# Patient Record
Sex: FEMALE | Race: BLACK OR AFRICAN AMERICAN | NOT HISPANIC OR LATINO | Employment: UNEMPLOYED | URBAN - METROPOLITAN AREA
[De-identification: names, ages, dates, MRNs, and addresses within clinical notes are randomized per-mention and may not be internally consistent; named-entity substitution may affect disease eponyms.]

---

## 2015-03-11 LAB — PAP: NORMAL

## 2017-01-19 ENCOUNTER — HOSPITAL ENCOUNTER (EMERGENCY)
Facility: CLINIC | Age: 32
Discharge: HOME | End: 2017-01-19
Attending: EMERGENCY MEDICINE | Admitting: EMERGENCY MEDICINE
Payer: MEDICAID

## 2017-01-19 ENCOUNTER — APPOINTMENT (OUTPATIENT)
Dept: ULTRASOUND IMAGING | Facility: CLINIC | Age: 32
End: 2017-01-19
Attending: EMERGENCY MEDICINE
Payer: MEDICAID

## 2017-01-19 ENCOUNTER — TELEPHONE (OUTPATIENT)
Dept: OBGYN | Facility: CLINIC | Age: 32
End: 2017-01-19

## 2017-01-19 VITALS
HEART RATE: 66 BPM | RESPIRATION RATE: 18 BRPM | DIASTOLIC BLOOD PRESSURE: 95 MMHG | OXYGEN SATURATION: 96 % | TEMPERATURE: 98.7 F | SYSTOLIC BLOOD PRESSURE: 129 MMHG

## 2017-01-19 DIAGNOSIS — O20.9 VAGINAL BLEEDING IN PREGNANCY, FIRST TRIMESTER: ICD-10-CM

## 2017-01-19 DIAGNOSIS — O20.0 THREATENED MISCARRIAGE: Primary | ICD-10-CM

## 2017-01-19 LAB
ABO + RH BLD: NORMAL
ABO + RH BLD: NORMAL
ALBUMIN UR-MCNC: 10 MG/DL
ANION GAP SERPL CALCULATED.3IONS-SCNC: 9 MMOL/L (ref 3–14)
APPEARANCE UR: CLEAR
B-HCG SERPL-ACNC: 184 IU/L
BASOPHILS # BLD AUTO: 0.1 10E9/L (ref 0–0.2)
BASOPHILS NFR BLD AUTO: 0.5 %
BILIRUB UR QL STRIP: NEGATIVE
BUN SERPL-MCNC: 11 MG/DL (ref 7–30)
CALCIUM SERPL-MCNC: 8 MG/DL (ref 8.5–10.1)
CHLORIDE SERPL-SCNC: 105 MMOL/L (ref 94–109)
CO2 SERPL-SCNC: 25 MMOL/L (ref 20–32)
COLOR UR AUTO: YELLOW
CREAT SERPL-MCNC: 0.6 MG/DL (ref 0.52–1.04)
DIFFERENTIAL METHOD BLD: NORMAL
EOSINOPHIL # BLD AUTO: 0.2 10E9/L (ref 0–0.7)
EOSINOPHIL NFR BLD AUTO: 1.6 %
ERYTHROCYTE [DISTWIDTH] IN BLOOD BY AUTOMATED COUNT: 13.2 % (ref 10–15)
GFR SERPL CREATININE-BSD FRML MDRD: ABNORMAL ML/MIN/1.7M2
GLUCOSE SERPL-MCNC: 101 MG/DL (ref 70–99)
GLUCOSE UR STRIP-MCNC: NEGATIVE MG/DL
HCT VFR BLD AUTO: 39.4 % (ref 35–47)
HGB BLD-MCNC: 13.1 G/DL (ref 11.7–15.7)
HGB UR QL STRIP: ABNORMAL
IMM GRANULOCYTES # BLD: 0 10E9/L (ref 0–0.4)
IMM GRANULOCYTES NFR BLD: 0.2 %
KETONES UR STRIP-MCNC: NEGATIVE MG/DL
LEUKOCYTE ESTERASE UR QL STRIP: ABNORMAL
LYMPHOCYTES # BLD AUTO: 3.3 10E9/L (ref 0.8–5.3)
LYMPHOCYTES NFR BLD AUTO: 33.8 %
MCH RBC QN AUTO: 29.4 PG (ref 26.5–33)
MCHC RBC AUTO-ENTMCNC: 33.2 G/DL (ref 31.5–36.5)
MCV RBC AUTO: 89 FL (ref 78–100)
MONOCYTES # BLD AUTO: 0.6 10E9/L (ref 0–1.3)
MONOCYTES NFR BLD AUTO: 6.3 %
NEUTROPHILS # BLD AUTO: 5.7 10E9/L (ref 1.6–8.3)
NEUTROPHILS NFR BLD AUTO: 57.6 %
NITRATE UR QL: NEGATIVE
NRBC # BLD AUTO: 0 10*3/UL
NRBC BLD AUTO-RTO: 0 /100
PH UR STRIP: 7 PH (ref 5–7)
PLATELET # BLD AUTO: 236 10E9/L (ref 150–450)
POTASSIUM SERPL-SCNC: 3.7 MMOL/L (ref 3.4–5.3)
RBC # BLD AUTO: 4.45 10E12/L (ref 3.8–5.2)
RBC #/AREA URNS AUTO: >182 /HPF (ref 0–2)
SODIUM SERPL-SCNC: 139 MMOL/L (ref 133–144)
SP GR UR STRIP: 1.01 (ref 1–1.03)
SPECIMEN EXP DATE BLD: NORMAL
URN SPEC COLLECT METH UR: ABNORMAL
UROBILINOGEN UR STRIP-MCNC: NORMAL MG/DL (ref 0–2)
WBC # BLD AUTO: 9.9 10E9/L (ref 4–11)
WBC #/AREA URNS AUTO: 36 /HPF (ref 0–2)

## 2017-01-19 PROCEDURE — 85025 COMPLETE CBC W/AUTO DIFF WBC: CPT | Performed by: EMERGENCY MEDICINE

## 2017-01-19 PROCEDURE — 86901 BLOOD TYPING SEROLOGIC RH(D): CPT | Performed by: EMERGENCY MEDICINE

## 2017-01-19 PROCEDURE — 87086 URINE CULTURE/COLONY COUNT: CPT | Performed by: EMERGENCY MEDICINE

## 2017-01-19 PROCEDURE — 80048 BASIC METABOLIC PNL TOTAL CA: CPT | Performed by: EMERGENCY MEDICINE

## 2017-01-19 PROCEDURE — 99284 EMERGENCY DEPT VISIT MOD MDM: CPT | Mod: 25

## 2017-01-19 PROCEDURE — 76801 OB US < 14 WKS SINGLE FETUS: CPT

## 2017-01-19 PROCEDURE — 84702 CHORIONIC GONADOTROPIN TEST: CPT | Performed by: EMERGENCY MEDICINE

## 2017-01-19 PROCEDURE — 81001 URINALYSIS AUTO W/SCOPE: CPT | Performed by: EMERGENCY MEDICINE

## 2017-01-19 ASSESSMENT — ENCOUNTER SYMPTOMS: ABDOMINAL PAIN: 1

## 2017-01-19 NOTE — TELEPHONE ENCOUNTER
Can you please have the hcg ordered stat, and also make sure she has an appointment to be seen as well?   Thank you.

## 2017-01-19 NOTE — ED AVS SNAPSHOT
[facility]    [address]    [facility] [address]    Phone:  [phone]    Fax:  [phone]                                       Joao العلي   MRN: [id number]    Department:  [facility]   Date of Visit:  1/19/2017           Patient Information     Date Of Birth          1985        Your diagnoses for this visit were:     Vaginal bleeding in pregnancy, first trimester        You were seen by Gustavo Rahman MD and Melanie Lee MD.      Follow-up Information     Follow up with Shanna Gunn MD. Call today.    Specialty:  OB/Gyn    Contact information:    [facility]  [address] [facility] [address]  [phone]          Discharge Instructions       Please call the Ob-gyn clinic today to scheduled an appointment for a repeat pregnancy hormone level test. Please return to the ED if your symptoms worsen or if you develop new or concerning symptoms.     Discharge Instructions  Vaginal Bleeding in Pregnancy    Bleeding in early pregnancy can be a sign of a miscarriage in process or an abnormal pregnancy, but often is innocent and the pregnancy will continue normally. We may do blood pregnancy tests and ultrasound to try to determine what is causing the bleeding in your case, but sometimes we can't tell and need to follow you with time, more blood tests, and another ultrasound.     Return to the Emergency Department if:    You have severe abdominal or pelvic pain.    You faint, or feel lightheaded or dizzy.     Your bleeding is gets much worse, and is heavier than a heavy period or if you pass any blood clots larger than a quarter.    You pass any tissue--solid material that doesn't appear smooth and even like a blood clot. If you pass tissue, save it (even if you have to pull it out of the toilet) and put it in a plastic bag or jar and bring it in.      You have a fever of 100.5 degrees or  higher.  If no pregnancy could be seen:    It may be that everything is normal and it is just too early to see the pregnancy, but you could have an ectopic pregnancy, which is a pregnancy in an abnormal location, such as in the tube. An ectopic pregnancy can cause severe internal bleeding or death.    You need to be seen by your regular doctor, or an OB/GYN doctor, in 48-72 hours for a repeat blood pregnancy test.    You should not be alone, in case you suddenly become very sick.     You should not have sex or put anything in your vagina.     If a pregnancy was seen in your uterus:    If a heartbeat could be seen, the chance of miscarriage is much lower.    You need to see your regular doctor, or an OB/GYN doctor, within 2-3 days.    You should not have sex or put anything in your vagina.     Facts about miscarriage: We hope you don't have a miscarriage, but if you do, here are important things to know:    Early miscarriage is very common, and having one miscarriage doesn't mean you will have problems with another pregnancy.    Nothing you did caused it. Taking medicine, drinking alcohol, having sex, exercising, or falling down won't cause a miscarriage.     If you were given a prescription for medicine here today, be sure to read all of the information (including the package insert) that comes with your prescription.  This will include important information about the medicine, its side effects, and any warnings that you need to know about.  The pharmacist who fills the prescription can provide more information and answer questions you may have about the medicine.  If you have questions or concerns that the pharmacist cannot address, please call or return to the Emergency Department.     Opioid Medication Information    Pain medications are among the most commonly prescribed medicines, so we are including this information for all our patients. If you did not receive pain medication or get a prescription for pain  medicine, you can ignore it.     You may have been given a prescription for an opioid (narcotic) pain medicine and/or have received a pain medicine while here in the Emergency Department. These medicines can make you drowsy or impaired. You must not drive, operate dangerous equipment, or engage in any other dangerous activities while taking these medications. If you drive while taking these medications, you could be arrested for DUI, or driving under the influence. Do not drink any alcohol while you are taking these medications.     Opioid pain medications can cause addiction. If you have a history of chemical dependency of any type, you are at a higher risk of becoming addicted to pain medications.  Only take these prescribed medications to treat your pain when all other options have been tried. Take it for as short a time and as few doses as possible. Store your pain pills in a secure place, as they are frequently stolen and provide a dangerous opportunity for children or visitors in your house to start abusing these powerful medications. We will not replace any lost or stolen medicine.  As soon as your pain is better, you should flush all your remaining medication.     Many prescription pain medications contain Tylenol  (acetaminophen), including Vicodin , Tylenol #3 , Norco , Lortab , and Percocet .  You should not take any extra pills of Tylenol  if you are using these prescription medications or you can get very sick.  Do not ever take more than 3000 mg of acetaminophen in any 24 hour period.    All opioids tend to cause constipation. Drink plenty of water and eat foods that have a lot of fiber, such as fruits, vegetables, prune juice, apple juice and high fiber cereal.  Take a laxative if you don t move your bowels at least every other day. Miralax , Milk of Magnesia, Colace , or Senna  can be used to keep you regular.      Remember that you can always come back to the Emergency Department if you are not able  to see your regular doctor in the amount of time listed above, if you get any new symptoms, or if there is anything that worries you.          24 Hour Appointment Hotline       To make an appointment at any [facility] clinic, call [phone] ([phone]). If you don't have a family doctor or clinic, we will help you find one. [facility] clinics are conveniently located to serve the needs of you and your family.             Review of your medicines      Notice     You have not been prescribed any medications.            Procedures and tests performed during your visit     Basic metabolic panel (BMP)    CBC + differential    HCG QUANTitative pregnancy (blood)    OB  US 1st trimester w transvag    Rh type    UA with Microscopic      Orders Needing Specimen Collection     None      Pending Results     No orders found from 1/18/2017 to 1/20/2017.            Pending Culture Results     No orders found from 1/18/2017 to 1/20/2017.       Test Results from your hospital stay           1/19/2017  4:47 AM - Interface, Flexilab Results      Component Results     Component Value Ref Range & Units Status    WBC 9.9 4.0 - 11.0 10e9/L Final    RBC Count 4.45 3.8 - 5.2 10e12/L Final    Hemoglobin 13.1 11.7 - 15.7 g/dL Final    Hematocrit 39.4 35.0 - 47.0 % Final    MCV 89 78 - 100 fl Final    MCH 29.4 26.5 - 33.0 pg Final    MCHC 33.2 31.5 - 36.5 g/dL Final    RDW 13.2 10.0 - 15.0 % Final    Platelet Count 236 150 - 450 10e9/L Final    Diff Method Automated Method  Final    % Neutrophils 57.6 % Final    % Lymphocytes 33.8 % Final    % Monocytes 6.3 % Final    % Eosinophils 1.6 % Final    % Basophils 0.5 % Final    % Immature Granulocytes 0.2 % Final    Nucleated RBCs 0 0 /100 Final    Absolute Neutrophil 5.7 1.6 - 8.3 10e9/L Final    Absolute Lymphocytes 3.3 0.8 - 5.3 10e9/L Final    Absolute Monocytes 0.6 0.0 - 1.3 10e9/L Final    Absolute Eosinophils 0.2 0.0 - 0.7 10e9/L Final    Absolute Basophils 0.1 0.0 - 0.2 10e9/L Final     Abs Immature Granulocytes 0.0 0 - 0.4 10e9/L Final    Absolute Nucleated RBC 0.0  Final         1/19/2017  5:05 AM - Interface, Flexilab Results      Component Results     Component Value Ref Range & Units Status    Sodium 139 133 - 144 mmol/L Final    Potassium 3.7 3.4 - 5.3 mmol/L Final    Chloride 105 94 - 109 mmol/L Final    Carbon Dioxide 25 20 - 32 mmol/L Final    Anion Gap 9 3 - 14 mmol/L Final    Glucose 101 (H) 70 - 99 mg/dL Final    Urea Nitrogen 11 7 - 30 mg/dL Final    Creatinine 0.60 0.52 - 1.04 mg/dL Final    GFR Estimate >90  Non  GFR Calc   >60 mL/min/1.7m2 Final    GFR Estimate If Black >90   GFR Calc   >60 mL/min/1.7m2 Final    Calcium 8.0 (L) 8.5 - 10.1 mg/dL Final         1/19/2017  5:05 AM - Interface, Flexilab Results      Component Results     Component Value Ref Range & Units Status    HCG Quantitative Serum 184 IU/L Final         1/19/2017  5:13 AM - Interface, Flexilab Results      Component Results     Component    ABO    O    RH(D)     Pos    Specimen Expires    01/22/2017 1/19/2017  6:08 AM - Interface, Flexilab Results      Component Results     Component Value Ref Range & Units Status    Color Urine Yellow  Final    Appearance Urine Clear  Final    Glucose Urine Negative NEG mg/dL Final    Bilirubin Urine Negative NEG Final    Ketones Urine Negative NEG mg/dL Final    Specific Gravity Urine 1.010 1.003 - 1.035 Final    Blood Urine Large (A) NEG Final    pH Urine 7.0 5.0 - 7.0 pH Final    Protein Albumin Urine 10 (A) NEG mg/dL Final    Urobilinogen mg/dL Normal 0.0 - 2.0 mg/dL Final    Nitrite Urine Negative NEG Final    Leukocyte Esterase Urine Small (A) NEG Final    Source Midstream Urine  Final    WBC Urine 36 (H) 0 - 2 /HPF Final    RBC Urine >182 (H) 0 - 2 /HPF Final         1/19/2017  5:49 AM - Interface, Radiant Ib      Narrative     US OB <14 WEEKS WITH TRANSVAGINAL SINGLE  1/19/2017 5:22 AM      HISTORY: Vaginal bleeding, early  pregnancy.     COMPARISON: None.    FINDINGS: Transabdominal and transvaginal imaging performed.  Transvaginal imaging performed to better visualize the endometrium and  adnexa. There is no evidence of an intrauterine pregnancy. The  endometrium is thickened and heterogeneous measuring up to 1.5 cm.  There is a 1.5 cm left ovarian cyst. The right ovary is normal in  appearance. No adnexal mass. No free pelvic fluid.        Impression     IMPRESSION: There is no evidence of pregnancy. The endometrium is  thickened and heterogeneous which may be due to a failed pregnancy.  Ectopic pregnancy is not ruled out.    BUCK CHAPARRO MD                Clinical Quality Measure: Blood Pressure Screening     Your blood pressure was checked while you were in the emergency department today. The last reading we obtained was  BP: (!) 129/95 mmHg . Please read the guidelines below about what these numbers mean and what you should do about them.  If your systolic blood pressure (the top number) is less than 120 and your diastolic blood pressure (the bottom number) is less than 80, then your blood pressure is normal. There is nothing more that you need to do about it.  If your systolic blood pressure (the top number) is 120-139 or your diastolic blood pressure (the bottom number) is 80-89, your blood pressure may be higher than it should be. You should have your blood pressure rechecked within a year by a primary care provider.  If your systolic blood pressure (the top number) is 140 or greater or your diastolic blood pressure (the bottom number) is 90 or greater, you may have high blood pressure. High blood pressure is treatable, but if left untreated over time it can put you at risk for heart attack, stroke, or kidney failure. You should have your blood pressure rechecked by a primary care provider within the next 4 weeks.  If your provider in the emergency department today gave you specific instructions to follow-up with your  "doctor or provider even sooner than that, you should follow that instruction and not wait for up to 4 weeks for your follow-up visit.        Thank you for choosing [facility]       Thank you for choosing [facility] for your care. Our goal is always to provide you with excellent care. Hearing back from our patients is one way we can continue to improve our services. Please take a few minutes to complete the written survey that you may receive in the mail after you visit with us. Thank you!        [employer] Information     [employer] lets you send messages to your doctor, view your test results, renew your prescriptions, schedule appointments and more. To sign up, go to [email or web address] . Click on \"Log in\" on the left side of the screen, which will take you to the Welcome page. Then click on \"Sign up Now\" on the right side of the page.     You will be asked to enter the access code listed below, as well as some personal information. Please follow the directions to create your username and password.     Your access code is: [email or web address]  Expires: 2017  6:13 AM     Your access code will  in 90 days. If you need help or a new code, please call your [facility] clinic or [phone].        Care EveryWhere ID     This is your Care EveryWhere ID. This could be used by other organizations to access your [facility] medical records  [id number]        After Visit Summary       This is your record. Keep this with you and show to your community pharmacist(s) and doctor(s) at your next visit.                  "

## 2017-01-19 NOTE — TELEPHONE ENCOUNTER
Pt calling, still having moderate bleeding today.  She was advised at ED to have f/u quant hcg tomorrow.  Ordered and made her lab appt at [facility].

## 2017-01-19 NOTE — TELEPHONE ENCOUNTER
Call to pt. Scheduled lab at [facility] and apt at [facility] as lab advised me they can get lab results back sooner here than they can in [place] due to being atatched to the hospital. Hcg reordered as stat.

## 2017-01-19 NOTE — ED PROVIDER NOTES
History   Chief Complaint:  Vaginal Bleeding      HPI   Joao العلي is a 32 year old  female who is approximately 5 weeks pregnant by LMP who presents to the emergency department for evaluation of vaginal bleeding. The patient indicates that she started having vaginal bleeding this morning with lower abdominal pain. She reports that her last period was 2016 and she is unsure how far along she is. She notes that she is not bleeding heavily and has had one pad since the bleeding started and she has not noticed any blood clots or tissue. She notes that her abdominal pain feels like cramping and is located diffusely across the lower abdomen. She denies any fever, chills, nausea, vomiting, or other associated symptoms.     Allergies:  NKDA     Medications:    The patient is currently on no regular medications.      Past Medical History:    The patient denies any significant past medical history.    Past Surgical History:    The patient does not have any pertinent past surgical history  Family / Social History:    No past pertinent family history.     Social History:  Negative for tobacco use.  Negative for alcohol use.    Review of Systems   Gastrointestinal: Positive for abdominal pain.   Genitourinary: Positive for vaginal bleeding.   All other systems reviewed and are negative.    Physical Exam   First Vitals:  BP: 132/70 mmHg  Temp: 98.7  F (37.1  C)  Temp src: Oral  Pulse: 66  Resp: 18  SpO2: 100 %       Physical Exam  Constitutional: The patient is oriented to person, place, and time. Alert and cooperative.  HENT:   Right Ear: External ear normal.   Left Ear: External ear normal.   Nose: Nose normal.   Mouth/Throat: Moist mucous membranes   Eyes: Conjunctivae, EOM and lids are normal.   Neck: Trachea normal. Normal range of motion. Neck supple.   Cardiovascular: Normal rate, regular rhythm, normal heart sounds, and intact distal pulses.    Pulmonary/Chest: Effort normal and breath sounds  equal bilaterally. No crackles or wheezing.   Abdominal: Soft. No significant tenderness to palpation. No rebound and no guarding.   Musculoskeletal: Normal range of motion.  No extremity tenderness or edema.  Neurological: Alert and Oriented. Strength 5/5 in upper and lower extremities bilaterally. Sensation intact to light touch throughout.  Skin: Skin is dry. No rash noted.    : normal appearing external genitalia. Small amount of blood in the vaginal vault. No cervical motion tenderness. Mild R adnexal tenderness. No L adnexal tenderness.       Emergency Department Course   Imaging:  Radiographic findings were communicated with the patient who voiced understanding of the findings.  OB US 1st trimester w transvag  There is no evidence of pregnancy. The endometrium is  thickened and heterogeneous which may be due to a failed pregnancy.  Ectopic pregnancy is not ruled out. As per radiology.     Laboratory:  CBC: WBC: 9.9, HGB: 13.1, PLT: 236  BMP: Glucose 101(H), Calcium 8.0(L), o/w WNL (Creatinine: 0.60)  UA: large blood, small leukocyte esterase, 36 wbcs, >182 rbcs  HCG quantitative: 184  Rh type: AB: O, Rh Pos    Emergency Department Course:  Nursing notes and vitals reviewed. I performed an exam of the patient as documented above.     Blood drawn. This was sent to the lab for further testing, results above.    The patient was sent for an ultrasound while in the emergency department, findings above.     Pelvic exam was performed with female ED nursing staff present in the room.  For details please see exam section above.    0543 I reevaluated the patient and provided an update in regards to her ED course.      0589 I consulted with Dr. Gunn, OB/GYN    I personally reviewed the laboratory and imaging results with the Patient and answered all related questions prior to discharge.     Findings and plan explained to the Patient. Patient discharged home with instructions regarding supportive care, medications,  and reasons to return. The importance of close follow-up was reviewed.   Impression & Plan    Medical Decision Making:  Joao العلي is a 32 year old  female at approximately 5 weeks gestation by LMP who presents to the emergency department for vaginal bleeding and abdominal cramping. Upon presentation in the ED, the patient is non toxic appearing. Vital signs are within normal limits and stable. On exam, she is well appearing. She is alert, oriented, and neurologic exam is non focal. Cardiopulmonary exam is unremarkable. Abdomen is soft and not significantly tender to palpation. On , examination there is a small amount of blood in the vaginal vault. There is no cervical motion tenderness. She has mild R adnexal tenderness. The rest of her exam is as mentioned above. Differential includes, but is not limited to, threatened , spontaneous , ectopic pregnancy, or incomplete . Labs were obtained and are as mentioned above. Notably, the patient is Rh positive, therefore rhogam is not indicated. Quantitative HCG level is 184. Urinalysis is remarkable for small leukocyte esterase, 36 wbcs, and >182 rbcs. She denies urinary symptoms, thus I do not feel that this reflects a urinary tract infection. A urine culture was added on, if this is abnormal then the patient will be treated with antibiotics at that time. OB ultrasound was obtained and demonstrates no evidence of pregnancy. The endometrium is thickened and heterogeneous which may be due to a failed pregnancy. However, ectopic pregnancy is not ruled out, though there are no adnexal masses noted. These results were discussed with the patient and she notes understanding. I did discuss with the patient that her symptoms could represent either early pregnancy verses a spontaneous . I also discussed that I cannot rule out an ectopic pregnancy. The patient was discussed with the on call OB/GYN. She notes that the patient can call the  clinic this morning to schedule a close follow up appointment. I did discuss this thoroughly with the patient and she notes understanding. Given that the patient is well appearing, I do feel that she is safe for discharge to home. She is in agreement with this plan. Return instructions were given. She was stable/improved at the time of discharge.     Diagnosis:    ICD-10-CM    1. Vaginal bleeding in pregnancy, first trimester O46.91 Rh type     Maria Teresa Said  1/19/2017   [facility] EMERGENCY DEPARTMENT    Maria Teresa YOUNGER Said, am serving as a scribe on 1/19/2017 at 4:26 AM to personally document services performed by Melanie Lee MD based on my observations and the provider's statements to me.       Melanie Lee MD  01/19/17 0623

## 2017-01-19 NOTE — ED AVS SNAPSHOT
[facility] Emergency Department    [address]    [facility] [address]    Phone:  [phone]    Fax:  [phone]                                       Joao العلي   MRN: [id number]    Department:  [facility] Emergency Department   Date of Visit:  1/19/2017           After Visit Summary Signature Page     I have received my discharge instructions, and my questions have been answered. I have discussed any challenges I see with this plan with the nurse or doctor.    ..........................................................................................................................................  Patient/Patient Representative Signature      ..........................................................................................................................................  Patient Representative Print Name and Relationship to Patient    ..................................................               ................................................  Date                                            Time    ..........................................................................................................................................  Reviewed by Signature/Title    ...................................................              ..............................................  Date                                                            Time

## 2017-01-19 NOTE — DISCHARGE INSTRUCTIONS
Please call the Ob-gyn clinic today to scheduled an appointment for a repeat pregnancy hormone level test. Please return to the ED if your symptoms worsen or if you develop new or concerning symptoms.     Discharge Instructions  Vaginal Bleeding in Pregnancy    Bleeding in early pregnancy can be a sign of a miscarriage in process or an abnormal pregnancy, but often is innocent and the pregnancy will continue normally. We may do blood pregnancy tests and ultrasound to try to determine what is causing the bleeding in your case, but sometimes we can't tell and need to follow you with time, more blood tests, and another ultrasound.     Return to the Emergency Department if:    You have severe abdominal or pelvic pain.    You faint, or feel lightheaded or dizzy.     Your bleeding is gets much worse, and is heavier than a heavy period or if you pass any blood clots larger than a quarter.    You pass any tissue--solid material that doesn't appear smooth and even like a blood clot. If you pass tissue, save it (even if you have to pull it out of the toilet) and put it in a plastic bag or jar and bring it in.      You have a fever of 100.5 degrees or higher.  If no pregnancy could be seen:    It may be that everything is normal and it is just too early to see the pregnancy, but you could have an ectopic pregnancy, which is a pregnancy in an abnormal location, such as in the tube. An ectopic pregnancy can cause severe internal bleeding or death.    You need to be seen by your regular doctor, or an OB/GYN doctor, in 48-72 hours for a repeat blood pregnancy test.    You should not be alone, in case you suddenly become very sick.     You should not have sex or put anything in your vagina.     If a pregnancy was seen in your uterus:    If a heartbeat could be seen, the chance of miscarriage is much lower.    You need to see your regular doctor, or an OB/GYN doctor, within 2-3 days.    You should not have sex or put anything in  your vagina.     Facts about miscarriage: We hope you don't have a miscarriage, but if you do, here are important things to know:    Early miscarriage is very common, and having one miscarriage doesn't mean you will have problems with another pregnancy.    Nothing you did caused it. Taking medicine, drinking alcohol, having sex, exercising, or falling down won't cause a miscarriage.     If you were given a prescription for medicine here today, be sure to read all of the information (including the package insert) that comes with your prescription.  This will include important information about the medicine, its side effects, and any warnings that you need to know about.  The pharmacist who fills the prescription can provide more information and answer questions you may have about the medicine.  If you have questions or concerns that the pharmacist cannot address, please call or return to the Emergency Department.     Opioid Medication Information    Pain medications are among the most commonly prescribed medicines, so we are including this information for all our patients. If you did not receive pain medication or get a prescription for pain medicine, you can ignore it.     You may have been given a prescription for an opioid (narcotic) pain medicine and/or have received a pain medicine while here in the Emergency Department. These medicines can make you drowsy or impaired. You must not drive, operate dangerous equipment, or engage in any other dangerous activities while taking these medications. If you drive while taking these medications, you could be arrested for DUI, or driving under the influence. Do not drink any alcohol while you are taking these medications.     Opioid pain medications can cause addiction. If you have a history of chemical dependency of any type, you are at a higher risk of becoming addicted to pain medications.  Only take these prescribed medications to treat your pain when all other options  have been tried. Take it for as short a time and as few doses as possible. Store your pain pills in a secure place, as they are frequently stolen and provide a dangerous opportunity for children or visitors in your house to start abusing these powerful medications. We will not replace any lost or stolen medicine.  As soon as your pain is better, you should flush all your remaining medication.     Many prescription pain medications contain Tylenol  (acetaminophen), including Vicodin , Tylenol #3 , Norco , Lortab , and Percocet .  You should not take any extra pills of Tylenol  if you are using these prescription medications or you can get very sick.  Do not ever take more than 3000 mg of acetaminophen in any 24 hour period.    All opioids tend to cause constipation. Drink plenty of water and eat foods that have a lot of fiber, such as fruits, vegetables, prune juice, apple juice and high fiber cereal.  Take a laxative if you don t move your bowels at least every other day. Miralax , Milk of Magnesia, Colace , or Senna  can be used to keep you regular.      Remember that you can always come back to the Emergency Department if you are not able to see your regular doctor in the amount of time listed above, if you get any new symptoms, or if there is anything that worries you.

## 2017-01-20 ENCOUNTER — OFFICE VISIT (OUTPATIENT)
Dept: OBGYN | Facility: CLINIC | Age: 32
End: 2017-01-20
Payer: MEDICAID

## 2017-01-20 VITALS — DIASTOLIC BLOOD PRESSURE: 70 MMHG | WEIGHT: 144 LBS | SYSTOLIC BLOOD PRESSURE: 110 MMHG | HEART RATE: 60 BPM

## 2017-01-20 DIAGNOSIS — O03.9 MISCARRIAGE: Primary | ICD-10-CM

## 2017-01-20 DIAGNOSIS — O20.0 THREATENED MISCARRIAGE: ICD-10-CM

## 2017-01-20 LAB
B-HCG SERPL-ACNC: 67 IU/L
BACTERIA SPEC CULT: NO GROWTH
Lab: NORMAL
MICRO REPORT STATUS: NORMAL
SPECIMEN SOURCE: NORMAL

## 2017-01-20 PROCEDURE — 84702 CHORIONIC GONADOTROPIN TEST: CPT | Performed by: OBSTETRICS & GYNECOLOGY

## 2017-01-20 PROCEDURE — 36415 COLL VENOUS BLD VENIPUNCTURE: CPT | Performed by: OBSTETRICS & GYNECOLOGY

## 2017-01-20 PROCEDURE — 99201 ZZC OFFICE/OUTPT VISIT, NEW, LEVEL I: CPT | Performed by: OBSTETRICS & GYNECOLOGY

## 2017-01-20 RX ORDER — PNV NO.95/FERROUS FUM/FOLIC AC 28MG-0.8MG
TABLET ORAL
COMMUNITY
Start: 2017-01-20 | End: 2017-03-28

## 2017-01-20 RX ORDER — ACETAMINOPHEN 325 MG/1
650 TABLET ORAL EVERY 4 HOURS PRN
Qty: 100 TABLET | Refills: 0 | COMMUNITY
Start: 2017-01-20 | End: 2017-03-28

## 2017-01-20 NOTE — PROGRESS NOTES
"Subjective: 31 y/o female G1 at approximately 5 wk ega presents for evaluation of miscarriage.     Was seen in ER on 1/19/17 for bleeding. US completed.     Patient reports bleeding has increased, \"like a normal period,\" clots, notes she bleeds less than this during a normal period.    Confirms pain, menstrual type cramping, denies fever or dysuria.     Asks about any changes needed to avoid future miscarriages. Notes she and partner had been attempting to conceive. Asks when they can begin trying to conceive again.     Was taking a prenatal vitamin, denies any other medication.     This document serves as a record of the services and decisions personally performed and made by Shanna Gunn MD. It was created on her behalf by Char Kramer, a trained medical scribe. The creation of this document is based the provider's statements to the medical scribe.  Char Kramer January 20, 2017 10:41 AM    Objective:  Physical exam:  GENERAL APPEARANCE: healthy, alert and no distress        Assessment/Plan:   (O03.9) Miscarriage  (primary encounter diagnosis); falling hcg levels noted.  Comment: Patient presented with reduced HCG levels, confirmed miscarriage.  Plan: HCG, quantitative, pregnancy  Explained HCG test results.   US results reviewed.   Patient to continue to check HCG levels to confirm reducing levels.   Discussed future contraception attempts.     The information in this document, created by the medical scribe for me, accurately reflects the services I personally performed and the decisions made by me. I have reviewed and approved this document for accuracy prior to leaving the patient care area.  1/20/2017 10:41 AM     Approximately <15 minutes were spent with the patient, of which > 50% was spent in face to face counselling.    Shanna uGnn M.D.      "

## 2017-01-20 NOTE — PROGRESS NOTES
Chief Complaint   Patient presents with     RECHECK     follow-up ER visit for bleeding in early pregnancy - US done 01/19/17 - HCG - pt still continues to have heavy bleeding and cramping       Initial /70 mmHg  Pulse 60  Wt 144 lb (65.318 kg)  LMP 12/05/2016 (Exact Date) There is no height on file to calculate BMI.  BP completed using cuff size: regular    Nurse assisted visit.  Noris Trimble MA.

## 2017-01-30 DIAGNOSIS — O03.9 MISCARRIAGE: ICD-10-CM

## 2017-01-30 LAB — B-HCG SERPL-ACNC: 2 IU/L

## 2017-01-30 PROCEDURE — 84702 CHORIONIC GONADOTROPIN TEST: CPT | Performed by: OBSTETRICS & GYNECOLOGY

## 2017-01-30 PROCEDURE — 36415 COLL VENOUS BLD VENIPUNCTURE: CPT | Performed by: OBSTETRICS & GYNECOLOGY

## 2017-03-28 ENCOUNTER — HOSPITAL ENCOUNTER (EMERGENCY)
Facility: CLINIC | Age: 32
Discharge: HOME | End: 2017-03-28
Attending: EMERGENCY MEDICINE | Admitting: EMERGENCY MEDICINE
Payer: COMMERCIAL

## 2017-03-28 VITALS
HEART RATE: 78 BPM | TEMPERATURE: 99.6 F | RESPIRATION RATE: 18 BRPM | OXYGEN SATURATION: 100 % | DIASTOLIC BLOOD PRESSURE: 65 MMHG | SYSTOLIC BLOOD PRESSURE: 112 MMHG

## 2017-03-28 DIAGNOSIS — L04.0 ACUTE LYMPHADENITIS OF NECK: ICD-10-CM

## 2017-03-28 PROCEDURE — 99283 EMERGENCY DEPT VISIT LOW MDM: CPT

## 2017-03-28 PROCEDURE — 25000131 ZZH RX MED GY IP 250 OP 636 PS 637: Performed by: EMERGENCY MEDICINE

## 2017-03-28 RX ORDER — DEXAMETHASONE 4 MG/1
12 TABLET ORAL ONCE
Status: COMPLETED | OUTPATIENT
Start: 2017-03-28 | End: 2017-03-28

## 2017-03-28 RX ORDER — AMOXICILLIN 875 MG
875 TABLET ORAL 2 TIMES DAILY
Qty: 20 TABLET | Refills: 0 | Status: SHIPPED | OUTPATIENT
Start: 2017-03-28 | End: 2017-06-13

## 2017-03-28 RX ADMIN — DEXAMETHASONE 12 MG: 4 TABLET ORAL at 19:04

## 2017-03-28 ASSESSMENT — ENCOUNTER SYMPTOMS
CHILLS: 0
SORE THROAT: 1
NAUSEA: 0
HEADACHES: 1
TROUBLE SWALLOWING: 0
SHORTNESS OF BREATH: 0
COUGH: 1
FEVER: 0
VOMITING: 0

## 2017-03-28 NOTE — ED NOTES
Patient presents to the ED reporting a sore throat. States had negative strep test at urgent care but has a lot of swelling in throat. Presents for further eval. No respiratory distress.

## 2017-03-28 NOTE — ED PROVIDER NOTES
History     Chief Complaint:  Sore Throat     HPI   Joao العلي is a 32 year old female who presents with a sore throat. The patient reports the gradual onset of a sore throat and congestion 3-4 days ago. She developed a posterior headache and cough 3 days ago. She was tested for strep at Urgent Care today and that was negative. She has been taking benadryl as she was concerned it was her seasonal allergies. She denies any fevers, chills, congestion, nausea, vomiting, or shortness of breath.     Allergies:  NKDA     Medications:    The patient is currently on no regular medications.     Past Medical History:    The patient denies any significant past medical history.     Past Surgical History:    The patient does not have any pertinent past surgical history.     Family History:    No past pertinent family history.     Social History:  Tobacco: Negative  Alcohol: Negative   Marital Status:  Single [1]     Review of Systems   Constitutional: Negative for chills and fever.   HENT: Positive for sore throat. Negative for congestion and trouble swallowing.    Respiratory: Positive for cough. Negative for shortness of breath.    Gastrointestinal: Negative for nausea and vomiting.   Neurological: Positive for headaches.   All other systems reviewed and are negative.      Physical Exam   First Vitals:  BP: 126/76  Pulse: 75  Temp: 99.6  F (37.6  C)  Resp: 18  SpO2: 99 %      Physical Exam   General: Patient is alert and interactive when I enter the room  Head:  The scalp, face, and head appear normal  Eyes:  The pupils are equal, round, and reactive to light    Conjunctivae and sclerae are normal  ENT:    External acoustic canals are normal    The oropharynx is mildly erythematous, no exudate.     Uvula is in the midline  Neck:  Normal range of motion    2cm right chain lymph node, tender, not red.     CV:  Regular rate. S1/S2. No murmurs.   Resp:  Lungs are clear without wheezes or rales. No distress  GI:  Abdomen  is soft, no rigidity, guarding, or rebound    No distension. No tenderness to palpation in any quadrant.     MS:  Normal tone. Joints grossly normal without effusions.     No asymmetric leg swelling, calf or thigh tenderness.      Normal motor assessment of all extremities.  Skin:  No rash or lesions noted. Normal capillary refill noted  Neuro: Speech is normal and fluent. Face is symmetric.     Moving all extremities well.   Psych:  Awake. Alert.  Normal affect.  Appropriate interactions.  Lymph: No anterior cervical lymphadenopathy noted    Emergency Department Course     Emergency Department Course:  Nursing notes and vitals reviewed.  I performed an exam of the patient as documented above.      Findings and plan explained to the Patient and spouse. Patient discharged home with instructions regarding supportive care, medications, and reasons to return. The importance of close follow-up was reviewed.      Impression & Plan      Medical Decision Making:  Joao العلي is a 32 year old female who presents for evaluation of a painful lump on her right neck.  This is consistent by clinical examination for a enlarged lymph node.  The DDx of this includes many etiologies including infection, malignancy, etc. Will start Amoxicillin and have close f/u with PCP.    Diagnosis:    ICD-10-CM    1. Acute lymphadenitis of neck L04.0        Disposition:  Discharged home.     Discharge Medications:  New Prescriptions    AMOXICILLIN (AMOXIL) 875 MG TABLET    Take 1 tablet (875 mg) by mouth 2 times daily     IOfe, am serving as a scribe on 3/28/2017 at 6:48 PM to personally document services performed by Dr. Naylor based on my observations and the provider's statements to me.      Ofe Sebastian  3/28/2017   [facility] EMERGENCY DEPARTMENT       Tl Naylor MD  03/28/17 [id number]

## 2017-03-28 NOTE — ED AVS SNAPSHOT
[facility] Emergency Department    [address]    [facility] [address]    Phone:  [phone]    Fax:  [phone]                                       Joao العلي   MRN: [id number]    Department:  [facility] Emergency Department   Date of Visit:  3/28/2017           After Visit Summary Signature Page     I have received my discharge instructions, and my questions have been answered. I have discussed any challenges I see with this plan with the nurse or doctor.    ..........................................................................................................................................  Patient/Patient Representative Signature      ..........................................................................................................................................  Patient Representative Print Name and Relationship to Patient    ..................................................               ................................................  Date                                            Time    ..........................................................................................................................................  Reviewed by Signature/Title    ...................................................              ..............................................  Date                                                            Time

## 2017-03-28 NOTE — ED AVS SNAPSHOT
[facility] Emergency Department    [address] [place] [address]    Phone:  [phone]    Fax:  [phone]                                       Joao العلي   MRN: [id number]    Department:  [facility] Emergency Department   Date of Visit:  3/28/2017           Patient Information     Date Of Birth          1985        Your diagnoses for this visit were:     Acute lymphadenitis of neck        You were seen by Tl Naylor MD.      Follow-up Information     Follow up with [facility] In 3 days.    Contact information:    [address]  [facility] [address]          Discharge Instructions         Local Lymph Node Infection, Antibiotic Treatment    You have a bacterial infection of a lymph node. The lymph nodes are part of your immune system. They become swollen and tender when an infection or inflammation is near them. The lymph nodes are found under the jaw and along the side of the neck, in the armpits, and in the groin. An infection or inflammation in nearby tissues causes the lymph nodes to swell and become tender.  When a bacterial infection occurs in the lymph node, it becomes very painful. The nearby skin gets red and warm. You may also have a fever.  Antibiotics and hot compresses are used to treat this infection. The pain and redness will get better over the next 7 to 10 days. Swelling may take several months to go away.  Sometimes an abscess (with pus) forms inside the lymph node. If this happens, antibiotics may not be enough to cure the infection. Minor surgery may be needed to drain the pus.  Home care  Follow these guidelines when caring for yourself at home:    Take all of the antibiotic medicine exactly as prescribed until it is gone. Be careful not to miss any doses, especially during the first few days.    Make a hot compress by running hot water over a face cloth. Apply it to the sore area until the cloth  cools off. Repeat this for 20 minutes. Use the hot compress 3 times a day for the first 3 days, or until the pain and redness begin to get better. The heat will increase the blood flow to the area and speed the healing process.    You may use acetaminophen or ibuprofen to control pain and fever, unless another medicine was prescribed for this. Don t use ibuprofen in children under 6 months of age. If you have chronic liver or kidney disease, talk with your health care provider before using these medicines. Also talk with your provider if you ve had a stomach ulcer or GI bleeding. Don t give aspirin to anyone under 18 years of age who is ill with a fever. It may cause severe liver damage.  Follow-up care  Follow up with your health care provider, or as advised, after you finish the antibiotics.  When to seek medical advice  Call your health care provider right away if any of these occur:    Redness, swelling or pain in the lymph node gets worse    The lymph node gets bigger, becomes soft in the middle, or doesn t seem to be getting better after you ve taken the antibiotics for 2 days (48 hours)    Pus or fluid drains from the lymph node    You have difficulty breathing or swallowing  Also call your provider right away if you have a fever, or your child s provider if:    Your child is younger than 12 weeks and has a fever of 100.4 F (38 C) or higher. Your baby may need to be seen by his or her health care provider.    Your child is of any age and has repeated fevers above 104 F (40 C).    Your child is younger than 2 years old and his or her fever continues for more than 24 hours. Or your child is 2 years old or older and his or her fever continues for more than 3 days.    3036-6184 [employer]. [address] [place] [address]. All rights reserved. This information is not intended as a substitute for professional medical care. Always follow your healthcare professional's  instructions.          24 Hour Appointment Hotline       To make an appointment at any [facility], call [phone] ([phone]). If you don't have a family doctor or clinic, we will help you find one. [facility] clinics are conveniently located to serve the needs of you and your family.             Review of your medicines      START taking        Dose / Directions Last dose taken    amoxicillin 875 MG tablet   Commonly known as:  AMOXIL   Dose:  875 mg   Quantity:  20 tablet        Take 1 tablet (875 mg) by mouth 2 times daily   Refills:  0                Prescriptions were sent or printed at these locations (1 Prescription)                   Other Prescriptions                Printed at Department/Unit printer (1 of 1)         amoxicillin (AMOXIL) 875 MG tablet                Orders Needing Specimen Collection     None      Pending Results     No orders found from 3/26/2017 to 3/29/2017.            Pending Culture Results     No orders found from 3/26/2017 to 3/29/2017.             Test Results from your hospital stay            Clinical Quality Measure: Blood Pressure Screening     Your blood pressure was checked while you were in the emergency department today. The last reading we obtained was  BP: 126/76 . Please read the guidelines below about what these numbers mean and what you should do about them.  If your systolic blood pressure (the top number) is less than 120 and your diastolic blood pressure (the bottom number) is less than 80, then your blood pressure is normal. There is nothing more that you need to do about it.  If your systolic blood pressure (the top number) is 120-139 or your diastolic blood pressure (the bottom number) is 80-89, your blood pressure may be higher than it should be. You should have your blood pressure rechecked within a year by a primary care provider.  If your systolic blood pressure (the top number) is 140 or greater or your diastolic blood pressure (the bottom  "number) is 90 or greater, you may have high blood pressure. High blood pressure is treatable, but if left untreated over time it can put you at risk for heart attack, stroke, or kidney failure. You should have your blood pressure rechecked by a primary care provider within the next 4 weeks.  If your provider in the emergency department today gave you specific instructions to follow-up with your doctor or provider even sooner than that, you should follow that instruction and not wait for up to 4 weeks for your follow-up visit.        Thank you for choosing [facility]       Thank you for choosing [facility] for your care. Our goal is always to provide you with excellent care. Hearing back from our patients is one way we can continue to improve our services. Please take a few minutes to complete the written survey that you may receive in the mail after you visit with us. Thank you!        [facility] Information     [employer] lets you send messages to your doctor, view your test results, renew your prescriptions, schedule appointments and more. To sign up, go to [email or web address] . Click on \"Log in\" on the left side of the screen, which will take you to the Welcome page. Then click on \"Sign up Now\" on the right side of the page.     You will be asked to enter the access code listed below, as well as some personal information. Please follow the directions to create your username and password.     Your access code is: [email or web address]  Expires: 2017  7:13 AM     Your access code will  in 90 days. If you need help or a new code, please call your [facility] clinic or [phone].        Care EveryWhere ID     This is your Care EveryWhere ID. This could be used by other organizations to access your [facility] medical records  [id number]        After Visit Summary       This is your record. Keep this with you and show to your community pharmacist(s) and doctor(s) at your next visit.                  "

## 2017-03-28 NOTE — DISCHARGE INSTRUCTIONS
Local Lymph Node Infection, Antibiotic Treatment    You have a bacterial infection of a lymph node. The lymph nodes are part of your immune system. They become swollen and tender when an infection or inflammation is near them. The lymph nodes are found under the jaw and along the side of the neck, in the armpits, and in the groin. An infection or inflammation in nearby tissues causes the lymph nodes to swell and become tender.  When a bacterial infection occurs in the lymph node, it becomes very painful. The nearby skin gets red and warm. You may also have a fever.  Antibiotics and hot compresses are used to treat this infection. The pain and redness will get better over the next 7 to 10 days. Swelling may take several months to go away.  Sometimes an abscess (with pus) forms inside the lymph node. If this happens, antibiotics may not be enough to cure the infection. Minor surgery may be needed to drain the pus.  Home care  Follow these guidelines when caring for yourself at home:    Take all of the antibiotic medicine exactly as prescribed until it is gone. Be careful not to miss any doses, especially during the first few days.    Make a hot compress by running hot water over a face cloth. Apply it to the sore area until the cloth cools off. Repeat this for 20 minutes. Use the hot compress 3 times a day for the first 3 days, or until the pain and redness begin to get better. The heat will increase the blood flow to the area and speed the healing process.    You may use acetaminophen or ibuprofen to control pain and fever, unless another medicine was prescribed for this. Don t use ibuprofen in children under 6 months of age. If you have chronic liver or kidney disease, talk with your health care provider before using these medicines. Also talk with your provider if you ve had a stomach ulcer or GI bleeding. Don t give aspirin to anyone under 18 years of age who is ill with a fever. It may cause severe liver  damage.  Follow-up care  Follow up with your health care provider, or as advised, after you finish the antibiotics.  When to seek medical advice  Call your health care provider right away if any of these occur:    Redness, swelling or pain in the lymph node gets worse    The lymph node gets bigger, becomes soft in the middle, or doesn t seem to be getting better after you ve taken the antibiotics for 2 days (48 hours)    Pus or fluid drains from the lymph node    You have difficulty breathing or swallowing  Also call your provider right away if you have a fever, or your child s provider if:    Your child is younger than 12 weeks and has a fever of 100.4 F (38 C) or higher. Your baby may need to be seen by his or her health care provider.    Your child is of any age and has repeated fevers above 104 F (40 C).    Your child is younger than 2 years old and his or her fever continues for more than 24 hours. Or your child is 2 years old or older and his or her fever continues for more than 3 days.    8835-6339 [employer]. [address] [place] [address], [place] [address]. All rights reserved. This information is not intended as a substitute for professional medical care. Always follow your healthcare professional's instructions.

## 2017-05-31 ENCOUNTER — TELEPHONE (OUTPATIENT)
Dept: OBGYN | Facility: CLINIC | Age: 32
End: 2017-05-31

## 2017-05-31 DIAGNOSIS — Z34.91 NORMAL PREGNANCY IN FIRST TRIMESTER: Primary | ICD-10-CM

## 2017-05-31 NOTE — TELEPHONE ENCOUNTER
Patient called and would like to establish prenatal care   Patient LMP 4/16/17  Patient G2 P  Patient complains of having nausea   Patient is taking prenatal vitamins.

## 2017-06-12 ENCOUNTER — OFFICE VISIT (OUTPATIENT)
Dept: OBGYN | Facility: CLINIC | Age: 32
End: 2017-06-12
Attending: ADVANCED PRACTICE MIDWIFE
Payer: COMMERCIAL

## 2017-06-12 DIAGNOSIS — Z34.80 SUPERVISION OF OTHER NORMAL PREGNANCY, ANTEPARTUM: Primary | ICD-10-CM

## 2017-06-12 DIAGNOSIS — Z36.89 ENCOUNTER FOR FETAL ANATOMIC SURVEY: ICD-10-CM

## 2017-06-12 DIAGNOSIS — O21.9 NAUSEA/VOMITING IN PREGNANCY: ICD-10-CM

## 2017-06-12 DIAGNOSIS — Z34.91 NORMAL PREGNANCY IN FIRST TRIMESTER: ICD-10-CM

## 2017-06-12 LAB
ABO + RH BLD: NORMAL
ABO + RH BLD: NORMAL
ERYTHROCYTE [DISTWIDTH] IN BLOOD BY AUTOMATED COUNT: 13.6 % (ref 10–15)
HCT VFR BLD AUTO: 36.1 % (ref 35–47)
HGB BLD-MCNC: 12.6 G/DL (ref 11.7–15.7)
MCH RBC QN AUTO: 29.4 PG (ref 26.5–33)
MCHC RBC AUTO-ENTMCNC: 34.9 G/DL (ref 31.5–36.5)
MCV RBC AUTO: 84 FL (ref 78–100)
PLATELET # BLD AUTO: 252 10E9/L (ref 150–450)
RBC # BLD AUTO: 4.28 10E12/L (ref 3.8–5.2)
SPECIMEN EXP DATE BLD: NORMAL
WBC # BLD AUTO: 10.3 10E9/L (ref 4–11)

## 2017-06-12 PROCEDURE — 86901 BLOOD TYPING SEROLOGIC RH(D): CPT | Performed by: ADVANCED PRACTICE MIDWIFE

## 2017-06-12 PROCEDURE — 82306 VITAMIN D 25 HYDROXY: CPT | Performed by: ADVANCED PRACTICE MIDWIFE

## 2017-06-12 PROCEDURE — 86747 PARVOVIRUS ANTIBODY: CPT | Performed by: ADVANCED PRACTICE MIDWIFE

## 2017-06-12 PROCEDURE — 85027 COMPLETE CBC AUTOMATED: CPT | Performed by: ADVANCED PRACTICE MIDWIFE

## 2017-06-12 PROCEDURE — 86706 HEP B SURFACE ANTIBODY: CPT | Performed by: ADVANCED PRACTICE MIDWIFE

## 2017-06-12 PROCEDURE — 36415 COLL VENOUS BLD VENIPUNCTURE: CPT | Performed by: ADVANCED PRACTICE MIDWIFE

## 2017-06-12 PROCEDURE — 76817 TRANSVAGINAL US OBSTETRIC: CPT | Mod: ZF

## 2017-06-12 PROCEDURE — 87086 URINE CULTURE/COLONY COUNT: CPT | Performed by: ADVANCED PRACTICE MIDWIFE

## 2017-06-12 PROCEDURE — 87389 HIV-1 AG W/HIV-1&-2 AB AG IA: CPT | Performed by: ADVANCED PRACTICE MIDWIFE

## 2017-06-12 PROCEDURE — 87340 HEPATITIS B SURFACE AG IA: CPT | Performed by: ADVANCED PRACTICE MIDWIFE

## 2017-06-12 PROCEDURE — 86900 BLOOD TYPING SEROLOGIC ABO: CPT | Performed by: ADVANCED PRACTICE MIDWIFE

## 2017-06-12 PROCEDURE — 83021 HEMOGLOBIN CHROMOTOGRAPHY: CPT | Performed by: ADVANCED PRACTICE MIDWIFE

## 2017-06-12 PROCEDURE — 86780 TREPONEMA PALLIDUM: CPT | Performed by: ADVANCED PRACTICE MIDWIFE

## 2017-06-12 NOTE — LETTER
2017       RE: Joao العلي  [address]  [place] [address]     Dear Colleague,    Thank you for referring your patient, Joao العلي, to the [facility] at [facility] Please see a copy of my visit note below.    32 year old female,  at 8+1 weeks by LMP 2017,  Estimated Date of Delivery: 2018, presents for confirmation of dates and assessment of viability. This study was done transvaginally.    Measurements     CRL = 14.7 mm = 7 6/7 weeks  EGA.       Fetal anatomy appears normal for gestational age.     Fetal/Fetal Cardiac Activity: Present.  FHR = 167bpm.     Implantation: anterior.     Cervix = 4.5 cm      Maternal structures - left ovarian cyst - 2.5 x 2.3 x 2.6cm    Impression: Normal appearing IUP at 8+1 weeks by LMP c/w today's study. Left ovary with 2.6 cm simple cyst.  Plan to re-evaluate at the time of her Level II.     Recommend comprehensive scan at 18 to 20 weeks.    [facility]  Aura Cook MD, FACOG        Again, thank you for allowing me to participate in the care of your patient.      Sincerely,    [facility]

## 2017-06-12 NOTE — PATIENT INSTRUCTIONS
"  Thank you for choosing [facility] for your obstetrical care.  We are an integrated health clinic with obstetricians, midwives, a psychologist, an acupuncturist, a nutritionist, a pharmacist, internal medicine and family practice all in one.  If you have questions about services offered please ask.      o Please keep all appointments with your provider.  You will help catch, prevent and treat problems early.  o Review your Expectant Family booklet and folder given to you at this intake visit.  It can answer many basic questions including:    Treatment for nausea and vomiting    Medications that are safe in pregnancy    Healthy diet and weight gain    Exercise and activity  o ASK questions!!  Please use \"Questions for my New OB visit\" form to write down any questions or concerns for your next visit.  o Eat a healthy diet.  Visit www.choosemyplate.gov and click on  Pregnancy and Breastfeeding  for information and tips  o Do not smoke.  Avoid other people's smoke, too.  We are happy to help with referrals to stop smoking programs.  o Do not drink alcohol.  o Try to avoid people who have colds or other infections.  Practice good hand washing.  o Consider registering for our Healthy Pregnancy Class here at [facility].  This class is offered every 3rd Wednesday from 2:30-4:30 p.m.  [place] at [phone] or online at [email or web address]  o Consider registering for prenatal education classes through [place] at [facility].  You can view class schedules and register online at [email or web address] or call [phone] for questions     For urgent concerns, call [facility] at [phone] to speak with a triage nurse during regular clinic hours (8:00 am - 4:30 pm).  This line is answered by our service 24 hours a day, after hours a provider will return your call.  "

## 2017-06-12 NOTE — LETTER
"2017       RE: Joao العلي  [address]  [place] [address]     Dear Colleague,    Thank you for referring your patient, Joao العلي, to the [facility] at [facility]. Please see a copy of my visit note below.    Subjective   32 year old female who presents to clinic for initiation of OB care with FOB.   at Estimated Date of Delivery: 2018  based on LMP with US confirmation.  Pregnancy is planned.  Symptoms since LMP include nausea.  Patient has tried these relief measures: ginger ale, which is helpful though she would like to try options with less sugar. Has been craving carbohydrates, but typically eats a protein-rich diet.  Patient reports a history of a 5 week SAB in 2017, which she passed without intervention or complications. Declines all genetic testing for this pregnancy and is agreeable to second trimester anatomy scan. Currently taking a prenatal vitamin and Vitamin D3 4000 IU daily. Reviewed dating ultrasound. Pt is . States that she desires \"as natural a birth as possible\" and would like to follow the midwives for care.    Patient denies any medical history. Patient denies any family history, including h/o DM.     PERSONAL/SOCIAL HISTORY  Lives with .  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 Plans to work through the summer. Pt is agreeable to Parvo screen.  Additional items: Denies past or present domestic violence, sexual and psychological abuse, though her partner was present for these screening questions. Does not live with any cats or other pets.     Objective  -VS: reviewed and within normal limits   -General appearance: no acute distress, patient is comfortable   NEUROLOGICAL/PSYCHIATRIC   - Orientated x3,   -Mood and affect: : normal and pleasant  Genetic/Infection questionnaire completed, " patient does not have risk factors.     Assessment/Plan   at 8 weeks 1 day by Patient's last menstrual period was 2017. 7 weeks 6 days today by US.    Encounter Diagnoses   Name Primary?     Supervision of other normal pregnancy -  [facility] CNM Pt Yes     Nausea/vomiting in pregnancy      Encounter for fetal anatomic survey      Miscarriage      Orders Placed This Encounter   Procedures     Hepatitis B Surface Antibody     CBC with platelets     Hepatitis B surface antigen     HGB Eval Reflex to ELP or RBC Solubility     HIV Antigen Antibody Combo     Parvovirus B19 antibodies IgG IgM     Anti Treponema     Vitamin D Deficiency     [facility] Center Referral [9046.022]     ABO and Rh     - Oriented to Practice, types of care, and how to reach a provider.  Pt desires CNM care.   - Patient received 1st trimester new OB education packet complete with aide of The Expectant Family booklet including information on genetic screening test options.  - Patient declines all genetic screening and desires level II ultrasound which was ordered.  - Patient was encouraged to continue prenatal vitamins as tolerated.    - Patient was sent to lab for routine OB labs, including Parvovirus B19 antibodies IgG and IgM.  -Last pap smear at provider in [place]; pt will bring contact information for provider to next visit.  Reports wnl.   -Nausea: advised B6 25 mg tid; Unisom 25 qhs and/or Kandi Capsules 250 mg qid.  - Patient instructed to schedule new OB exam with provider 3 weeks.      Pt to RTO for NOB visit in 3 weeks and prn if questions or concerns.   MAURO Fernandes CNM, CNM

## 2017-06-12 NOTE — MR AVS SNAPSHOT
After Visit Summary   6/12/2017    Joao العلي    MRN: [id number]           Patient Information     Date Of Birth          1985        Visit Information        Provider Department      6/12/2017 1:30 PM [facility]        Today's Diagnoses     Normal pregnancy in first trimester           Follow-ups after your visit        Your next 10 appointments already scheduled     Aug 21, 2017  2:15 PM CDT   M US COMP with [id number]   [facility])    [address]  [facility] [id number] [phone]           Wear comfortable clothes and leave your valuables at home.            Aug 21, 2017  2:45 PM CDT   Radiology MD with [facility] MD   [facility])    [address]  [facility] [address] [phone]           Please arrive at the time given for your first appointment.  This visit is used internally to schedule the physician's time during your ultrasound.              Future tests that were ordered for you today     Open Future Orders        Priority Expected Expires Ordered    MFM US Comprehensive Single Routine  4/13/2018 6/13/2017            Who to contact     Please call your clinic at [phone] to:    Ask questions about your health    Make or cancel appointments    Discuss your medicines    Learn about your test results    Speak to your doctor   If you have compliments or concerns about an experience at your clinic, or if you wish to file a complaint, please contact [facility] Patient Relations at [phone] or email us at [email or web address]         Additional Information About Your Visit        MyChart Information     [employer] gives you secure access to your electronic health record. If you see a primary care provider, you  can also send messages to your care team and make appointments. If you have questions, please call your primary care clinic.  If you do not have a primary care provider, please call [phone] and they will assist you.      [employer] is an electronic gateway that provides easy, online access to your medical records. With [employer], you can request a clinic appointment, read your test results, renew a prescription or communicate with your care team.     To access your existing account, please contact your [facility] or call [phone] for assistance.        Care EveryWhere ID     This is your Care EveryWhere ID. This could be used by other organizations to access your [facility] medical records  [id number]        Your Vitals Were     Last Period                   04/16/2017            Blood Pressure from Last 3 Encounters:   06/12/17 118/77   03/28/17 112/65   01/20/17 110/70    Weight from Last 3 Encounters:   06/12/17 66.8 kg (147 lb 4.8 oz)   01/20/17 65.3 kg (144 lb)              We Performed the Following     Dating ultrasound less than 14 weeks gestation Transvag  - [address] (In Clinic)          Today's Medication Changes          These changes are accurate as of: 6/12/17 11:59 PM.  If you have any questions, ask your nurse or doctor.               Start taking these medicines.        Dose/Directions    Cholecalciferol 4000 UNITS Tabs   Used for:  Supervision of other normal pregnancy, antepartum   Started by:  Patti Solis APRN CNM        Dose:  1 tablet   Take 1 tablet by mouth daily   Refills:  0            Where to get your medicines      Some of these will need a paper prescription and others can be bought over the counter.  Ask your nurse if you have questions.     You don't need a prescription for these medications     Cholecalciferol 4000 UNITS Tabs                Primary Care Provider    Physician No Ref-Primary       No address on file        Thank you!     Thank  you for choosing [facility]  for your care. Our goal is always to provide you with excellent care. Hearing back from our patients is one way we can continue to improve our services. Please take a few minutes to complete the written survey that you may receive in the mail after your visit with us. Thank you!             Your Updated Medication List - Protect others around you: Learn how to safely use, store and throw away your medicines at www.disposemymeds.org.          This list is accurate as of: 6/12/17 11:59 PM.  Always use your most recent med list.                   Brand Name Dispense Instructions for use    Cholecalciferol 4000 UNITS Tabs      Take 1 tablet by mouth daily       PRENATAL 19 Chew      Take 1 tablet by mouth daily

## 2017-06-12 NOTE — PROGRESS NOTES
32 year old female,  at 8+1 weeks by LMP 2017,  Estimated Date of Delivery: 2018, presents for confirmation of dates and assessment of viability. This study was done transvaginally.    Measurements     CRL = 14.7 mm = 7 6/7 weeks  EGA.       Fetal anatomy appears normal for gestational age.     Fetal/Fetal Cardiac Activity: Present.  FHR = 167bpm.     Implantation: anterior.     Cervix = 4.5 cm      Maternal structures - left ovarian cyst - 2.5 x 2.3 x 2.6cm    Impression: Normal appearing IUP at 8+1 weeks by LMP c/w today's study. Left ovary with 2.6 cm simple cyst.  Plan to re-evaluate at the time of her Level II.     Recommend comprehensive scan at 18 to 20 weeks.    LAVERN Morgan MD, FACOG

## 2017-06-12 NOTE — LETTER
Date:June 14, 2017      Patient was self referred, no letter generated. Do not send.        [facility] Health Information

## 2017-06-12 NOTE — PROGRESS NOTES
"  Subjective   32 year old female who presents to clinic for initiation of OB care with FOB.   at Estimated Date of Delivery: 2018  based on LMP with US confirmation.  Pregnancy is planned.  Symptoms since LMP include nausea.  Patient has tried these relief measures: ginger ale, which is helpful though she would like to try options with less sugar. Has been craving carbohydrates, but typically eats a protein-rich diet.  Patient reports a history of a 5 week SAB in 2017, which she passed without intervention or complications. Declines all genetic testing for this pregnancy and is agreeable to second trimester anatomy scan. Currently taking a prenatal vitamin and Vitamin D3 4000 IU daily. Reviewed dating ultrasound. Pt is . States that she desires \"as natural a birth as possible\" and would like to follow the midwives for care.    Patient denies any medical history. Patient denies any family history, including h/o DM.     PERSONAL/SOCIAL HISTORY  Lives with .  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 Plans to work through the summer. Pt is agreeable to Parvo screen.  Additional items: Denies past or present domestic violence, sexual and psychological abuse, though her partner was present for these screening questions. Does not live with any cats or other pets.     Objective  -VS: reviewed and within normal limits   -General appearance: no acute distress, patient is comfortable   NEUROLOGICAL/PSYCHIATRIC   - Orientated x3,   -Mood and affect: : normal and pleasant  Genetic/Infection questionnaire completed, patient does not have risk factors.     Assessment/Plan   at 8 weeks 1 day by Patient's last menstrual period was 2017. 7 weeks 6 days today by US.    Encounter Diagnoses   Name Primary?     Supervision of other normal pregnancy -  [facility] CNM Pt Yes     Nausea/vomiting in pregnancy  "     Encounter for fetal anatomic survey      Miscarriage      Orders Placed This Encounter   Procedures     Hepatitis B Surface Antibody     CBC with platelets     Hepatitis B surface antigen     HGB Eval Reflex to ELP or RBC Solubility     HIV Antigen Antibody Combo     Parvovirus B19 antibodies IgG IgM     Anti Treponema     Vitamin D Deficiency     Maternal Fetal Medicine Center Referral [3987.213]     ABO and Rh       - Oriented to Practice, types of care, and how to reach a provider.  Pt desires CNM care.   - Patient received 1st trimester new OB education packet complete with aide of The Expectant Family booklet including information on genetic screening test options.  - Patient declines all genetic screening and desires level II ultrasound which was ordered.  - Patient was encouraged to continue prenatal vitamins as tolerated.    - Patient was sent to lab for routine OB labs, including Parvovirus B19 antibodies IgG and IgM.  -Last pap smear at provider in [place]; pt will bring contact information for provider to next visit.  Reports wnl.   -Nausea: advised B6 25 mg tid; Unisom 25 qhs and/or Kandi Capsules 250 mg qid.  - Patient instructed to schedule new OB exam with provider 3 weeks.      Pt to RTO for NOB visit in 3 weeks and prn if questions or concerns.     MAURO Fernandes CNM, MAXIMINO

## 2017-06-12 NOTE — MR AVS SNAPSHOT
"              After Visit Summary   2017    Joao العلي    MRN: [id number]           Patient Information     Date Of Birth          1985        Visit Information        Provider Department      2017 2:15 PM Patti Solis APRN CNM [facility]        Today's Diagnoses     Supervision of other normal pregnancy -  [facility] MAXIMINO Pt    -  1    Nausea/vomiting in pregnancy        Encounter for fetal anatomic survey          Care Instructions      Thank you for choosing [facility] ([facility]) for your obstetrical care.  We are an integrated health clinic with obstetricians, midwives, a psychologist, an acupuncturist, a nutritionist, a pharmacist, internal medicine and family practice all in one.  If you have questions about services offered please ask.      o Please keep all appointments with your provider.  You will help catch, prevent and treat problems early.  o Review your Expectant Family booklet and folder given to you at this intake visit.  It can answer many basic questions including:    Treatment for nausea and vomiting    Medications that are safe in pregnancy    Healthy diet and weight gain    Exercise and activity  o ASK questions!!  Please use \"Questions for my New OB visit\" form to write down any questions or concerns for your next visit.  o Eat a healthy diet.  Visit www.choosemyplate.gov and click on  Pregnancy and Breastfeeding  for information and tips  o Do not smoke.  Avoid other people's smoke, too.  We are happy to help with referrals to stop smoking programs.  o Do not drink alcohol.  o Try to avoid people who have colds or other infections.  Practice good hand washing.  o Consider registering for our Healthy Pregnancy Class here at [facility].  This class is offered every  from 2:30-4:30 p.m.  [place] at [phone] or online at [email or web address] or [email or web address]  o Consider registering for prenatal " education classes through [employer] at [facility].  You can view class schedules and register online at [email or web address] Online or call [phone] for questions     For urgent concerns, call [facility] at [phone] to speak with a triage nurse during regular clinic hours (8:00 am - 4:30 pm).  This line is answered by our service 24 hours a day, after hours a provider will return your call.          Follow-ups after your visit        Additional Services     Maternal Fetal Medicine Center Referral [9046.022]       >> Patient may proceed with recommendations for further testing as directed by the Maternal Fetal Medicine Specialist >>    >> If requesting Fetal Echo: MFM will determine appropriate location for exam due to indication.    >> If requesting Lung Maturity Amnio:  If results indicate fetal lung maturity, induction or C/S is recommended within 36 hours.  Please schedule accordingly.     Dear Patient:   Please be aware that coverage of these services is subject to the terms and limitations of your health insurance plan.  Call member services at your health plan with any benefit or coverage questions.      Please bring the following to your appointment:    >>  Any x-rays, CTs or MRIs which have been performed.  Contact the facility where they were done to arrange for  prior to your scheduled appointment.  Any new CT, MRI or other procedures ordered by your specialist must be performed at a [place] facility or coordinated by your clinic's referral office.  >>  List of current medications   >>  This referral request   >>  Any documents/labs given to you for this referral                  Follow-up notes from your care team     Return in about 3 weeks (around 7/3/2017) for NOB.      Your next 10 appointments already scheduled     Jun 30, 2017  1:15 PM CDT   NEW OB with MAURO Soto CNM   [facility] ([facility])    [facility]  [address] [address]  [address]  [facility] [address]   [phone]            Aug 21, 2017  2:15 PM CDT   MFM US COMP with [id number]   [facility]trasound - [place] ([facility])    [address]  [facility] [address] [phone]           Wear comfortable clothes and leave your valuables at home.            Aug 21, 2017  2:45 PM CDT   Radiology MD with SULMA PURCLEL MD   [facility] - [facility])    [address]  [facility] [address]   [phone]           Please arrive at the time given for your first appointment.  This visit is used internally to schedule the physician's time during your ultrasound.              Who to contact     Please call your clinic at [phone] to:    Ask questions about your health    Make or cancel appointments    Discuss your medicines    Learn about your test results    Speak to your doctor   If you have compliments or concerns about an experience at your clinic, or if you wish to file a complaint, please contact [facility] Patient Relations at [phone] or email us at [email or web address]         Additional Information About Your Visit        Songvice Information     Songvice gives you secure access to your electronic health record. If you see a primary care provider, you can also send messages to your care team and make appointments. If you have questions, please call your primary care clinic.  If you do not have a primary care provider, please call [phone] and they will assist you.      Songvice is an electronic gateway that provides easy, online access to your medical records. With Songvice, you can request a clinic appointment, read your test results, renew a prescription or communicate with your care team.     To access your existing account, please contact your [facility]  [facility] [facility] or call [phone] for assistance.        Care EveryWhere ID     This is your Care EveryWhere ID. This could be used by other organizations to access your [place] medical records  [id number]        Your Vitals Were     Pulse Height Last Period BMI (Body Mass Index)          64 1.702 m (5' 7\") 04/16/2017 23.07 kg/m2         Blood Pressure from Last 3 Encounters:   06/12/17 118/77   03/28/17 112/65   01/20/17 110/70    Weight from Last 3 Encounters:   06/12/17 66.8 kg (147 lb 4.8 oz)   01/20/17 65.3 kg (144 lb)              We Performed the Following     ABO and Rh     Anti Treponema     CBC with platelets     Hepatitis B Surface Antibody     Hepatitis B surface antigen     HGB Eval Reflex to ELP or RBC Solubility     HIV Antigen Antibody Combo     Maternal Fetal Medicine Center Referral [9046.022]     Parvovirus B19 antibodies IgG IgM     Urine Culture Aerobic Bacterial     Vitamin D Deficiency          Today's Medication Changes          These changes are accurate as of: 6/12/17 11:59 PM.  If you have any questions, ask your nurse or doctor.               Start taking these medicines.        Dose/Directions    Cholecalciferol 4000 UNITS Tabs   Used for:  Supervision of other normal pregnancy, antepartum   Started by:  Patti Solis APRN CNM        Dose:  1 tablet   Take 1 tablet by mouth daily   Refills:  0            Where to get your medicines      Some of these will need a paper prescription and others can be bought over the counter.  Ask your nurse if you have questions.     You don't need a prescription for these medications     Cholecalciferol 4000 UNITS Tabs                Primary Care Provider    Physician No Ref-Primary       No address on file        Thank you!     Thank you for choosing [facility]  for your care. Our goal is always to provide you with excellent care. Hearing back from our patients is one way we can continue to improve our " services. Please take a few minutes to complete the written survey that you may receive in the mail after your visit with us. Thank you!             Your Updated Medication List - Protect others around you: Learn how to safely use, store and throw away your medicines at www.disposemymeds.org.          This list is accurate as of: 6/12/17 11:59 PM.  Always use your most recent med list.                   Brand Name Dispense Instructions for use    Cholecalciferol 4000 UNITS Tabs      Take 1 tablet by mouth daily       PRENATAL 19 Chew      Take 1 tablet by mouth daily

## 2017-06-13 ENCOUNTER — TELEPHONE (OUTPATIENT)
Dept: OBGYN | Facility: CLINIC | Age: 32
End: 2017-06-13

## 2017-06-13 VITALS
SYSTOLIC BLOOD PRESSURE: 118 MMHG | BODY MASS INDEX: 23.12 KG/M2 | WEIGHT: 147.3 LBS | HEIGHT: 67 IN | HEART RATE: 64 BPM | DIASTOLIC BLOOD PRESSURE: 77 MMHG

## 2017-06-13 PROBLEM — O21.9 NAUSEA/VOMITING IN PREGNANCY: Status: ACTIVE | Noted: 2017-06-13

## 2017-06-13 LAB
DEPRECATED CALCIDIOL+CALCIFEROL SERPL-MC: 25 UG/L (ref 20–75)
HBV SURFACE AB SERPL IA-ACNC: 303.4 M[IU]/ML
HBV SURFACE AG SERPL QL IA: NONREACTIVE
HIV 1+2 AB+HIV1 P24 AG SERPL QL IA: NORMAL
T PALLIDUM IGG+IGM SER QL: NEGATIVE

## 2017-06-13 RX ORDER — PNV NO.118/IRON FUMARATE/FA 29 MG-1 MG
1 TABLET,CHEWABLE ORAL DAILY
Refills: 9 | COMMUNITY
Start: 2017-01-14

## 2017-06-14 LAB
B19V IGG SER IA-ACNC: 3.91
B19V IGM SER IA-ACNC: 0.11
BACTERIA SPEC CULT: NORMAL
HGB A1 MFR BLD: 96.5 %
HGB A2 MFR BLD: 3.1 %
HGB C MFR BLD: 0 %
HGB E MFR BLD: 0 %
HGB F MFR BLD: 0.4 %
HGB FRACT BLD ELPH-IMP: NORMAL
HGB OTHER MFR BLD: 0 %
HGB S BLD QL SOLY: NORMAL
HGB S MFR BLD: 0 %
Lab: NORMAL
MICRO REPORT STATUS: NORMAL
PATH INTERP BLD-IMP: NORMAL
SPECIMEN SOURCE: NORMAL

## 2017-06-30 ENCOUNTER — OFFICE VISIT (OUTPATIENT)
Dept: OBGYN | Facility: CLINIC | Age: 32
End: 2017-06-30
Attending: ADVANCED PRACTICE MIDWIFE
Payer: COMMERCIAL

## 2017-06-30 VITALS
HEIGHT: 67 IN | SYSTOLIC BLOOD PRESSURE: 105 MMHG | BODY MASS INDEX: 23.83 KG/M2 | DIASTOLIC BLOOD PRESSURE: 67 MMHG | WEIGHT: 151.8 LBS

## 2017-06-30 DIAGNOSIS — Z34.80 SUPERVISION OF OTHER NORMAL PREGNANCY, ANTEPARTUM: Primary | ICD-10-CM

## 2017-06-30 PROCEDURE — 87491 CHLMYD TRACH DNA AMP PROBE: CPT | Performed by: ADVANCED PRACTICE MIDWIFE

## 2017-06-30 PROCEDURE — 87591 N.GONORRHOEAE DNA AMP PROB: CPT | Performed by: ADVANCED PRACTICE MIDWIFE

## 2017-06-30 ASSESSMENT — PAIN SCALES - GENERAL: PAINLEVEL: NO PAIN (0)

## 2017-06-30 NOTE — NURSING NOTE
Breastfeeding education provided:  - Benefits of Breastfeeding discussed with Joao who does plan to breastfeed. Informed of the many resources available to her during her pregnancy in the hospital and after delivering.Pt indicated understanding and agreed with plan.    Cris Lugo RN

## 2017-06-30 NOTE — PROGRESS NOTES
SUBJECTIVE:    32 year old, female, , 10w5d,  who presents to the clinic today for a new ob visit. Nausea has decreased. Has started PNV.  Estimated Date of Delivery:  is calculated from Patient's last menstrual period was 2017.  She has not had bleeding since her LMP.   She has had mild nausea. Weight loss has not occurred.   This was a planned pregnancy.   FOB is involved,       OTHER CONCERNS: none    ===========================================  ROS  PSYCHIATRIC:  Denies mood changes or depression  Social History   Substance Use Topics     Smoking status: Never Smoker     Smokeless tobacco: Never Used     Alcohol use No     History   Drug Use No     History   Smoking Status     Never Smoker   Smokeless Tobacco     Never Used       Alcohol use No     Family History   Problem Relation Age of Onset     DIABETES No family hx of      ============================================  MEDICAL HISTORY   No Known Allergies    [unfilled]      Current Outpatient Prescriptions:      Prenatal Vit-Fe Fumarate-FA (PRENATAL 19) CHEW, Take 1 tablet by mouth daily, Disp: , Rfl: 9     Cholecalciferol 4000 UNITS TABS, Take 1 tablet by mouth daily, Disp: , Rfl:     Medical history negative    Surgical history negative         Obstetric History       T0      L0     SAB0   TAB0   Ectopic0   Multiple0   Live Births0       # Outcome Date GA Lbr Varinder/2nd Weight Sex Delivery Anes PTL Lv   2 Current            1 SAB 16 5w0d    SAB         Obstetric Comments   Pregnancy 2016 - Passed SAB without intervention or complications.        GYN History- No Abnormal Pap Smears                        Cervical procedures: none                        History of STI: none    I personally reviewed the past social/family/medical and surgical history on the date of service.   I reviewed lab work done at Intake visit with patient.    OBJECTIVE:   PHYSICAL EXAM:  /67  Ht 1.702 m (5'  "7.01\")  Wt 68.9 kg (151 lb 12.8 oz)  LMP 2017  BMI 23.77 kg/m2  BMI- Body mass index is 23.77 kg/(m^2)., GENERAL:  Pleasant pregnant female, alert, cooperative  and well groomed.  SKIN:  Warm and dry, without lesions or rashes  HEAD: Symmetrical features.  MOUTH:  Buccal mucosa pink, moist without lesions.  Teeth in good repair.    NECK:  Thyroid without enlargement and nodules.  Lymph nodes not palpable.   LUNGS:  Clear to auscultation.  BREAST:    Nipples everted.      HEART:  RRR without murmur.  ABDOMEN: Soft without masses , tenderness or organomegaly.  No CVA tenderness.  Uterus palpable at size equal to dates.  No scars noted.. Fetal heart tones present.  MUSCULOSKELETAL:  Full range of motion  EXTREMITIES:  No edema. No significant varicosities.   PELVIC EXAM:  Deferred  Pap up to date  GC/CHLAMYDIA CULTURE OBTAINED:YES    ASSESSMENT:  Intrauterine pregnancy 10w5d size  consistent with dates  Genetic Screening: Level 2 Ultrasound only which is scheduled  Encounter Diagnosis   Name Primary?     Supervision of other normal pregnancy -  S CNM Pt Yes        PLAN:  Urine for GC/chlam collected today.    - Reviewed use of triage nurse line and contacting the on-call provider after hours for an urgent need such as fever, vagina bleeding, bladder or vaginal infection, rupture of membranes,  or term labor.    - Reviewed best evidence for: weight gain for her weight and height for pregnancy:  RECOMMENDED WEIGHT GAIN: 25-35 lbs.   healthy diet and foods to avoid; exercise and activity during pregnancy;avoiding exposure to toxoplasmosis; and maintenance of a generally healthy lifestyle.   - Discussed the harms, benefits, side effects and alternative therapies for current prescribed and OTC medications.    - All pt's questions discussed and answered.  Pt verbalized understanding of and agreement to plan of care.     - Continue scheduled prenatal care and prn if questions or concerns    Gill Gonzalez" MAURO BernabeM

## 2017-06-30 NOTE — LETTER
2017       RE: Joao العلي  [address]  [facility] [place] [facility] [address]     Dear Colleague,    Thank you for referring your patient, Joao العلي, to the [facility] at [facility]. Please see a copy of my visit note below.    SUBJECTIVE:    32 year old, female, , 10w5d,  who presents to the clinic today for a new ob visit. Nausea has decreased. Has started PNV.  Estimated Date of Delivery:  is calculated from Patient's last menstrual period was 2017.  She has not had bleeding since her LMP.   She has had mild nausea. Weight loss has not occurred.   This was a planned pregnancy.   FOB is involved,       OTHER CONCERNS: none    ===========================================  ROS  PSYCHIATRIC:  Denies mood changes or depression  Social History   Substance Use Topics     Smoking status: Never Smoker     Smokeless tobacco: Never Used     Alcohol use No     History   Drug Use No     History   Smoking Status     Never Smoker   Smokeless Tobacco     Never Used       Alcohol use No     Family History   Problem Relation Age of Onset     DIABETES No family hx of      ============================================  MEDICAL HISTORY   No Known Allergies    [unfilled]      Current Outpatient Prescriptions:      Prenatal Vit-Fe Fumarate-FA (PRENATAL 19) CHEW, Take 1 tablet by mouth daily, Disp: , Rfl: 9     Cholecalciferol 4000 UNITS TABS, Take 1 tablet by mouth daily, Disp: , Rfl:     Medical history negative    Surgical history negative    Obstetric History       T0      L0     SAB0   TAB0   Ectopic0   Multiple0   Live Births0       # Outcome Date GA Lbr Varinder/2nd Weight Sex Delivery Anes PTL Lv   2 Current            1 SAB 16 5w0d    SAB         Obstetric Comments   Pregnancy 2016 - Passed SAB without intervention or complications.      GYN History- No Abnormal Pap Smears                       "  Cervical procedures: none                        History of STI: none    I personally reviewed the past social/family/medical and surgical history on the date of service.   I reviewed lab work done at Intake visit with patient.    OBJECTIVE:   PHYSICAL EXAM:  /67  Ht 1.702 m (5' 7.01\")  Wt 68.9 kg (151 lb 12.8 oz)  LMP 2017  BMI 23.77 kg/m2  BMI- Body mass index is 23.77 kg/(m^2)., GENERAL:  Pleasant pregnant female, alert, cooperative  and well groomed.  SKIN:  Warm and dry, without lesions or rashes  HEAD: Symmetrical features.  MOUTH:  Buccal mucosa pink, moist without lesions.  Teeth in good repair.    NECK:  Thyroid without enlargement and nodules.  Lymph nodes not palpable.   LUNGS:  Clear to auscultation.  BREAST:    Nipples everted.      HEART:  RRR without murmur.  ABDOMEN: Soft without masses , tenderness or organomegaly.  No CVA tenderness.  Uterus palpable at size equal to dates.  No scars noted.. Fetal heart tones present.  MUSCULOSKELETAL:  Full range of motion  EXTREMITIES:  No edema. No significant varicosities.   PELVIC EXAM:  Deferred  Pap up to date  GC/CHLAMYDIA CULTURE OBTAINED:YES    ASSESSMENT:  Intrauterine pregnancy 10w5d size  consistent with dates  Genetic Screening: Level 2 Ultrasound only which is scheduled  Encounter Diagnosis   Name Primary?     Supervision of other normal pregnancy -  [facility] CNM Pt Yes        PLAN:  Urine for GC/chlam collected today.    - Reviewed use of triage nurse line and contacting the on-call provider after hours for an urgent need such as fever, vagina bleeding, bladder or vaginal infection, rupture of membranes,  or term labor.    - Reviewed best evidence for: weight gain for her weight and height for pregnancy:  RECOMMENDED WEIGHT GAIN: 25-35 lbs.   healthy diet and foods to avoid; exercise and activity during pregnancy;avoiding exposure to toxoplasmosis; and maintenance of a generally healthy lifestyle.   - Discussed the harms, " benefits, side effects and alternative therapies for current prescribed and OTC medications.    - All pt's questions discussed and answered.  Pt verbalized understanding of and agreement to plan of care.     - Continue scheduled prenatal care and prn if questions or concerns    MAURO Soto CNM

## 2017-06-30 NOTE — NURSING NOTE
Chief Complaint   Patient presents with     Prenatal Care   10.5 weeks  nausea all day long.  Using foods and natural things to help-  Fatigue is bad as well. Pap smear due 218- woulk like to wait until post partum

## 2017-06-30 NOTE — MR AVS SNAPSHOT
After Visit Summary   2017    Joao العلي    MRN: [id number]           Patient Information     Date Of Birth          1985        Visit Information        Provider Department      2017 1:15 PM Gill Bernabe APRN CNM [facility]        Today's Diagnoses     Supervision of other normal pregnancy -  [facility] MAX[facility]INO Pt    -  1       Follow-ups after your visit        Follow-up notes from your care team     Return in about 5 weeks (around 2017) for NILSA.      Your next 10 appointments already scheduled     Aug 04, 2017  1:15 PM CDT   RETURN OB with MAURO Alexis CNM   [facility])    [facility] [address] [address]  [address]  [facility] [address] [phone]            Aug 21, 2017  2:15 PM CDT   MFRADHA US COMP with URMFMUSR1   [facility])    [address]  [facility] [address] [phone]           Wear comfortable clothes and leave your valuables at home.            Aug 21, 2017  2:45 PM CDT   Radiology MD with UR JAZZY CARL   [facility])    [address]  [facility] [address] [phone]           Please arrive at the time given for your first appointment.  This visit is used internally to schedule the physician's time during your ultrasound.              Who to contact     Please call your clinic at [phone] to:    Ask questions about your health    Make or cancel appointments    Discuss your medicines    Learn about your test results    Speak to your doctor   If you have compliments or concerns about an experience at your clinic, or if you wish to file a complaint, please contact [facility] Patient Relations at [phone] or email us at  [email or web address]         Additional Information About Your Visit        Social Medianhart Information     [employer] gives you secure access to your electronic health record. If you see a primary care provider, you can also send messages to your care team and make appointments. If you have questions, please call your primary care clinic.  If you do not have a primary care provider, please call [phone] and they will assist you.      SulfurCell is an electronic gateway that provides easy, online access to your medical records. With SulfurCell, you can request a clinic appointment, read your test results, renew a prescription or communicate with your care team.     To access your existing account, please contact your [facility] or call [phone] for assistance.        Care EveryWhere ID     This is your Care EveryWhere ID. This could be used by other organizations to access your [place] medical records  [id number]        Your Vitals Were     Height Last Period BMI (Body Mass Index)             1.702 m (5' 7.01\") 04/16/2017 23.77 kg/m2          Blood Pressure from Last 3 Encounters:   06/30/17 105/67   06/12/17 118/77   03/28/17 112/65    Weight from Last 3 Encounters:   06/30/17 68.9 kg (151 lb 12.8 oz)   06/12/17 66.8 kg (147 lb 4.8 oz)   01/20/17 65.3 kg (144 lb)              We Performed the Following     Chlamydia PCR (Clinic Collect)     Gonorrhea PCR        Primary Care Provider    Physician No Ref-Primary       No address on file        Equal Access to Services     MJ LAMB : Hadii aad ku hadasho Soomaali, waaxda luqadaha, qaybta kaalmada adeegyada, ahl dawson . So [facility] [phone].    ATENCIÓN: Si habla español, tiene a case disposición servicios gratuitos de asistencia lingüística. Llame al [phone].    We comply with applicable federal civil rights laws and [place] laws. We do not discriminate on the basis of race, color, national " origin, age, disability sex, sexual orientation or gender identity.            Thank you!     Thank you for choosing [facility]  for your care. Our goal is always to provide you with excellent care. Hearing back from our patients is one way we can continue to improve our services. Please take a few minutes to complete the written survey that you may receive in the mail after your visit with us. Thank you!             Your Updated Medication List - Protect others around you: Learn how to safely use, store and throw away your medicines at www.disposemymeds.org.          This list is accurate as of: 6/30/17  2:29 PM.  Always use your most recent med list.                   Brand Name Dispense Instructions for use Diagnosis    Cholecalciferol 4000 UNITS Tabs      Take 1 tablet by mouth daily    Supervision of other normal pregnancy, antepartum       PRENATAL 19 Chew      Take 1 tablet by mouth daily

## 2017-07-02 LAB
C TRACH DNA SPEC QL NAA+PROBE: NORMAL
N GONORRHOEA DNA SPEC QL NAA+PROBE: NORMAL
SPECIMEN SOURCE: NORMAL
SPECIMEN SOURCE: NORMAL

## 2017-07-25 ENCOUNTER — PRE VISIT (OUTPATIENT)
Dept: OBGYN | Facility: CLINIC | Age: 32
End: 2017-07-25

## 2017-08-04 ENCOUNTER — OFFICE VISIT (OUTPATIENT)
Dept: OBGYN | Facility: CLINIC | Age: 32
End: 2017-08-04
Attending: ADVANCED PRACTICE MIDWIFE
Payer: COMMERCIAL

## 2017-08-04 VITALS
HEART RATE: 71 BPM | WEIGHT: 154.4 LBS | BODY MASS INDEX: 24.23 KG/M2 | DIASTOLIC BLOOD PRESSURE: 64 MMHG | SYSTOLIC BLOOD PRESSURE: 99 MMHG | HEIGHT: 67 IN

## 2017-08-04 DIAGNOSIS — Z34.92 NORMAL PREGNANCY IN SECOND TRIMESTER: Primary | ICD-10-CM

## 2017-08-04 DIAGNOSIS — Z34.80 SUPERVISION OF OTHER NORMAL PREGNANCY, ANTEPARTUM: ICD-10-CM

## 2017-08-04 PROCEDURE — 99211 OFF/OP EST MAY X REQ PHY/QHP: CPT | Mod: ZF

## 2017-08-04 ASSESSMENT — PAIN SCALES - GENERAL: PAINLEVEL: NO PAIN (0)

## 2017-08-04 NOTE — PROGRESS NOTES
"Subjective:      32 year old  at 15w5d presents for a routine prenatal appointment.     Denies vaginal bleeding or leakage of fluid.  Denies cramping.    Thinks that she may be starting to feel fetal movement.       No HA, visual changes, RUQ or epigastric pain.   The patient presents with the following concerns: none.  Energy level improving and nausea is subsiding.    Level II US  Scheduled.        Objective:  Vitals:    17 1315   BP: 99/64   BP Location: Left arm   Patient Position: Chair   Cuff Size: Adult Regular   Pulse: 71   Weight: 70 kg (154 lb 6.4 oz)   Height: 1.702 m (5' 7.01\")     See OB flowsheet    Assessment/Plan     Encounter Diagnosis   Name Primary?     Normal pregnancy in second trimester Yes           - Reviewed total weight gain, encouraged continued healthy diet and exercise.      - Reviewed why/how to contact provider.    Patient education/orders or handouts today:  PTL signs/symptoms and fetal movement   Return to clinic in 3 weeks, after Level II US and prn if questions or concerns.   MAURO Alexis CNM              "

## 2017-08-04 NOTE — MR AVS SNAPSHOT
After Visit Summary   2017    Joao العلي    MRN: [id number]           Patient Information     Date Of Birth          1985        Visit Information        Provider Department      2017 1:15 PM Leora Mello APRN CNM [facility]        Today's Diagnoses     Normal pregnancy in second trimester    -  1    Supervision of other normal pregnancy -  [facility] MAXIMINO Pt           Follow-ups after your visit        Follow-up notes from your care team     Return in about 4 weeks (around 2017) for NILSA.      Your next 10 appointments already scheduled     Aug 21, 2017  2:15 PM CDT   MFM US COMP with URMFMUSR3   [facility])    [address]  [facility] [address] [phone]           Wear comfortable clothes and leave your valuables at home.            Aug 21, 2017  2:45 PM CDT   Radiology MD with UR JAZZY CARL   [facility])    [address]  [facility] [address] [phone]           Please arrive at the time given for your first appointment. This visit is used internally to schedule the physician's time during your ultrasound.            Sep 01, 2017  1:00 PM CDT   RETURN OB with MAURO Bentley CNM   [facility] ([facility])    [facility] [address]  [address]  [facility] [address] [phone]              Who to contact     Please call your clinic at [phone] to:    Ask questions about your health    Make or cancel appointments    Discuss your medicines    Learn about your test results    Speak to your doctor   If you have compliments or concerns about an experience at your clinic, or if you wish to file a complaint, please contact [facility] Patient Relations at [phone]  "or email us at [email or web address]         Additional Information About Your Visit        [employer] Information     [employer] gives you secure access to your electronic health record. If you see a primary care provider, you can also send messages to your care team and make appointments. If you have questions, please call your primary care clinic.  If you do not have a primary care provider, please call [phone] and they will assist you.      [employer] is an electronic gateway that provides easy, online access to your medical records. With [employer], you can request a clinic appointment, read your test results, renew a prescription or communicate with your care team.     To access your existing account, please contact your [facility] or call [phone] for assistance.        Care EveryWhere ID     This is your Care EveryWhere ID. This could be used by other organizations to access your [place] medical records  [id number]        Your Vitals Were     Pulse Height Last Period BMI (Body Mass Index)          71 1.702 m (5' 7.01\") 04/16/2017 24.18 kg/m2         Blood Pressure from Last 3 Encounters:   08/04/17 99/64   06/30/17 105/67   06/12/17 118/77    Weight from Last 3 Encounters:   08/04/17 70 kg (154 lb 6.4 oz)   06/30/17 68.9 kg (151 lb 12.8 oz)   06/12/17 66.8 kg (147 lb 4.8 oz)              Today, you had the following     No orders found for display       Primary Care Provider    Physician No Ref-Primary       No address on file        Equal Access to Services     MJ LAMB : Hadii aad ku hadasho Soomaali, waaxda luqadaha, qaybta kaalmada adeegyada, hal dawson . So [facility] [phone].    ATENCIÓN: Si habla español, tiene a case disposición servicios gratuitos de asistencia lingüística. Llame al [phone].    We comply with applicable federal civil rights laws and [place] laws. We do not discriminate on the basis of race, color, " national origin, age, disability sex, sexual orientation or gender identity.            Thank you!     Thank you for choosing [facility]  for your care. Our goal is always to provide you with excellent care. Hearing back from our patients is one way we can continue to improve our services. Please take a few minutes to complete the written survey that you may receive in the mail after your visit with us. Thank you!             Your Updated Medication List - Protect others around you: Learn how to safely use, store and throw away your medicines at www.disposemymeds.org.          This list is accurate as of: 8/4/17  2:25 PM.  Always use your most recent med list.                   Brand Name Dispense Instructions for use Diagnosis    Cholecalciferol 4000 UNITS Tabs      Take 1 tablet by mouth daily    Supervision of other normal pregnancy, antepartum       PRENATAL 19 Chew      Take 1 tablet by mouth daily

## 2017-08-04 NOTE — LETTER
"2017       RE: Jooa العلي  [address]  [facility] [address]     Dear Colleague,    Thank you for referring your patient, Joao العلي, to the [facility] at [facility]. Please see a copy of my visit note below.    Subjective:      32 year old  at 15w5d presents for a routine prenatal appointment.     Denies vaginal bleeding or leakage of fluid.  Denies cramping.    Thinks that she may be starting to feel fetal movement.       No HA, visual changes, RUQ or epigastric pain.   The patient presents with the following concerns: none.  Energy level improving and nausea is subsiding.    Level II US  Scheduled.     Objective:  Vitals:    17 1315   BP: 99/64   BP Location: Left arm   Patient Position: Chair   Cuff Size: Adult Regular   Pulse: 71   Weight: 70 kg (154 lb 6.4 oz)   Height: 1.702 m (5' 7.01\")     See OB flowsheet    Assessment/Plan     Encounter Diagnosis   Name Primary?     Normal pregnancy in second trimester Yes     - Reviewed total weight gain, encouraged continued healthy diet and exercise.      - Reviewed why/how to contact provider.    Patient education/orders or handouts today:  PTL signs/symptoms and fetal movement   Return to clinic in 3 weeks, after Level II US and prn if questions or concerns.   MAURO Alexis CNM    "

## 2017-08-15 ENCOUNTER — TELEPHONE (OUTPATIENT)
Dept: OBGYN | Facility: CLINIC | Age: 32
End: 2017-08-15

## 2017-08-15 DIAGNOSIS — Z34.80 SUPERVISION OF OTHER NORMAL PREGNANCY, ANTEPARTUM: Primary | ICD-10-CM

## 2017-08-15 RX ORDER — .BETA.-CAROTENE, ASCORBIC ACID, CHOLECALCIFEROL, .ALPHA.-TOCOPHEROL ACETATE, DL-, THIAMINE, RIBOFLAVIN, NIACINAMIDE, PYRIDOXINE HYDROCHLORIDE, FOLIC ACID, CYANOCOBALAMIN, CALCIUM PANTOTHENATE, CALCIUM CARBONATE, FERROUS FUMARATE, ZINC OXIDE AND DOCUSATE SODIUM 1000; 100; 400; 30; 3; 3; 15; 20; 1; 12; 7; 200; 29; 20; 25 [IU]/1; MG/1; [IU]/1; MG/1; MG/1; MG/1; MG/1; MG/1; MG/1; UG/1; MG/1; MG/1; MG/1; MG/1; MG/1
1 TABLET ORAL DAILY
Qty: 100 TABLET | Refills: 3 | Status: SHIPPED | OUTPATIENT
Start: 2017-08-15 | End: 2017-09-01

## 2017-08-15 NOTE — TELEPHONE ENCOUNTER
Spoke to Joao who had questions about her PNV. She wants one without gelatin. I gave her the recommended doses for folic acid and DHA. I spoke to pharmacist at [facility] where she wants it prescribed. I did order the non-gelatin one they carry but it doesn't have DHA. When Joao comes to pick it up they will let her know it doesn't have DHA. They don't think there is a non gelatin that has DHA.

## 2017-08-15 NOTE — TELEPHONE ENCOUNTER
"----- Message from Janice Garrett sent at 8/15/2017  2:35 PM CDT -----  Regardin Questions  Contact: [phone]  Left v/m at 2:24 \"I have 2 questions for an RN, please call me back\".  No further detail.  "

## 2017-08-17 ENCOUNTER — PRE VISIT (OUTPATIENT)
Dept: MATERNAL FETAL MEDICINE | Facility: CLINIC | Age: 32
End: 2017-08-17

## 2017-08-21 ENCOUNTER — OFFICE VISIT (OUTPATIENT)
Dept: MATERNAL FETAL MEDICINE | Facility: CLINIC | Age: 32
End: 2017-08-21
Attending: ADVANCED PRACTICE MIDWIFE
Payer: COMMERCIAL

## 2017-08-21 ENCOUNTER — HOSPITAL ENCOUNTER (OUTPATIENT)
Dept: ULTRASOUND IMAGING | Facility: CLINIC | Age: 32
Discharge: HOME | End: 2017-08-21
Attending: ADVANCED PRACTICE MIDWIFE | Admitting: OBSTETRICS & GYNECOLOGY
Payer: COMMERCIAL

## 2017-08-21 DIAGNOSIS — Z36.3 SCREENING, ANTENATAL, FOR MALFORMATION BY ULTRASOUND: ICD-10-CM

## 2017-08-21 DIAGNOSIS — O35.9XX0 SUSPECTED FETAL ANOMALY, ANTEPARTUM, NOT APPLICABLE OR UNSPECIFIED FETUS: Primary | ICD-10-CM

## 2017-08-21 DIAGNOSIS — O26.90 PREGNANCY RELATED CONDITION, UNSPECIFIED TRIMESTER: ICD-10-CM

## 2017-08-21 PROCEDURE — 76811 OB US DETAILED SNGL FETUS: CPT

## 2017-08-21 NOTE — MR AVS SNAPSHOT
After Visit Summary   2017    Joao العلي    MRN: [id number]           Patient Information     Date Of Birth          1985        Visit Information        Provider Department      2017 2:45 PM Bryon Watson MD [facility] [place]        Today's Diagnoses     Suspected fetal anomaly, antepartum, not applicable or unspecified fetus    -  1    Screening, , for malformation by ultrasound           Follow-ups after your visit        Your next 10 appointments already scheduled     Sep 01, 2017  1:00 PM CDT   RETURN OB with MAURO Adkins CNM   [facility] ([facility])    [facility] [address]  [address]  [address]  [facility] [address] [phone]            Sep 18, 2017  1:30 PM CDT   MFM US COMPRE SINGLE F/U with URMFMUSR3   [facility] [place] ([facility])    [address]  [facility] [address] [phone]           Wear comfortable clothes and leave your valuables at home.            Sep 18, 2017  2:00 PM CDT   Radiology MD with UR JAZZY CARL   [facility] [place] ([facility])    [address]  [facility] [address] [phone]           Please arrive at the time given for your first appointment. This visit is used internally to schedule the physician's time during your ultrasound.              Future tests that were ordered for you today     Open Future Orders        Priority Expected Expires Ordered    MFM US Comprehensive Single F/U Routine 2017            Who to contact     If you have questions or need follow up information about today's clinic visit or your schedule please contact [facility] directly at [phone].  Normal or non-critical lab and imaging  results will be communicated to you by MyChart, letter or phone within 4 business days after the clinic has received the results. If you do not hear from us within 7 days, please contact the clinic through [employer] or phone. If you have a critical or abnormal lab result, we will notify you by phone as soon as possible.  Submit refill requests through [employer] or call your pharmacy and they will forward the refill request to us. Please allow 3 business days for your refill to be completed.          Additional Information About Your Visit        [employer] Information     [employer] gives you secure access to your electronic health record. If you see a primary care provider, you can also send messages to your care team and make appointments. If you have questions, please call your primary care clinic.  If you do not have a primary care provider, please call [phone] and they will assist you.        Care EveryWhere ID     This is your Care EveryWhere ID. This could be used by other organizations to access your [place] medical records  [id number]        Your Vitals Were     Last Period                   04/16/2017            Blood Pressure from Last 3 Encounters:   08/04/17 99/64   06/30/17 105/67   06/12/17 118/77    Weight from Last 3 Encounters:   08/04/17 70 kg (154 lb 6.4 oz)   06/30/17 68.9 kg (151 lb 12.8 oz)   06/12/17 66.8 kg (147 lb 4.8 oz)               Primary Care Provider    Physician No Ref-Primary       No address on file        Equal Access to Services     MJ LAMB AH: Hadii aad ku hadasho Soomaali, waaxda luqadaha, qaybta kaalmada adeegyada, hal dawson . So [facility] [phone].    ATENCIÓN: Si habla español, tiene a case disposición servicios gratuitos de asistencia lingüística. Llame al [phone].    We comply with applicable federal civil rights laws and [place] laws. We do not discriminate on the basis of race, color, national origin, age, disability sex, sexual  orientation or gender identity.            Thank you!     Thank you for choosing [facility]  for your care. Our goal is always to provide you with excellent care. Hearing back from our patients is one way we can continue to improve our services. Please take a few minutes to complete the written survey that you may receive in the mail after your visit with us. Thank you!             Your Updated Medication List - Protect others around you: Learn how to safely use, store and throw away your medicines at www.disposemymeds.org.          This list is accurate as of: 17  3:42 PM.  Always use your most recent med list.                   Brand Name Dispense Instructions for use Diagnosis    Cholecalciferol 4000 UNITS Tabs      Take 1 tablet by mouth daily    Supervision of other normal pregnancy, antepartum       PRENATAL 19 Chew      Take 1 tablet by mouth daily        SE- 19 29-1 MG Tabs     100 tablet    Take 1 tablet by mouth daily    Supervision of other normal pregnancy, antepartum

## 2017-08-21 NOTE — PROGRESS NOTES
Please refer to ultrasound report under 'Imaging' Studies of 'Chart Review' tabs.    Bryon Watson M.D.

## 2017-09-01 ENCOUNTER — OFFICE VISIT (OUTPATIENT)
Dept: OBGYN | Facility: CLINIC | Age: 32
End: 2017-09-01
Attending: MIDWIFE
Payer: COMMERCIAL

## 2017-09-01 VITALS
BODY MASS INDEX: 25.36 KG/M2 | WEIGHT: 161.6 LBS | HEIGHT: 67 IN | DIASTOLIC BLOOD PRESSURE: 62 MMHG | SYSTOLIC BLOOD PRESSURE: 104 MMHG

## 2017-09-01 DIAGNOSIS — Z34.92 NORMAL PREGNANCY IN SECOND TRIMESTER: Primary | ICD-10-CM

## 2017-09-01 PROCEDURE — 99211 OFF/OP EST MAY X REQ PHY/QHP: CPT | Mod: ZF

## 2017-09-01 ASSESSMENT — PAIN SCALES - GENERAL: PAINLEVEL: NO PAIN (0)

## 2017-09-01 NOTE — MR AVS SNAPSHOT
After Visit Summary   9/1/2017    Joao العلي    MRN: [id number]           Patient Information     Date Of Birth          1985        Visit Information        Provider Department      9/1/2017 1:00 PM Linda Small APRN CNM [facility]        Today's Diagnoses     Normal pregnancy in second trimester    -  1       Follow-ups after your visit        Follow-up notes from your care team     Return in about 4 weeks (around 9/29/2017).      Your next 10 appointments already scheduled     Sep 18, 2017  1:30 PM CDT   MFM US COMPRE SINGLE F/U with URMFMUSR3   [facility] [place] ([facility])    [address]  [facility] [address] [phone]           Wear comfortable clothes and leave your valuables at home.            Sep 18, 2017  2:00 PM CDT   Radiology MD with UR RADHA CARL   [facility])    [address]  [facility] [id number] [phone]           Please arrive at the time given for your first appointment. This visit is used internally to schedule the physician's time during your ultrasound.            Sep 29, 2017  4:00 PM CDT   RETURN OB with MAURO Buckley CNM   [facility] ([facility])    [place] [facility] [address]  [address]  [address]  [facility] [id number] [phone]              Who to contact     Please call your clinic at [phone] to:    Ask questions about your health    Make or cancel appointments    Discuss your medicines    Learn about your test results    Speak to your doctor   If you have compliments or concerns about an experience at your clinic, or if you wish to file a complaint, please contact [facility] Patient Relations at [phone] or email us at  [email or web address]         Additional Information About Your Visit        [employer] Information     [employer] gives you secure access to your electronic health record. If you see a primary care provider, you can also send messages to your care team and make appointments. If you have questions, please call your primary care clinic.  If you do not have a primary care provider, please call [phone] and they will assist you.      [employer] is an electronic gateway that provides easy, online access to your medical records. With [employer], you can request a clinic appointment, read your test results, renew a prescription or communicate with your care team.     To access your existing account, please contact your [facility] or call [phone] for assistance.        Care EveryWhere ID     This is your Care EveryWhere ID. This could be used by other organizations to access your [place] medical records  [id number]        Your Vitals Were     Height Last Period BMI (Body Mass Index)             1.702 m (5' 7.01\") 2017 25.3 kg/m2          Blood Pressure from Last 3 Encounters:   17 104/62   17 99/64   17 105/67    Weight from Last 3 Encounters:   17 73.3 kg (161 lb 9.6 oz)   17 70 kg (154 lb 6.4 oz)   17 68.9 kg (151 lb 12.8 oz)              Today, you had the following     No orders found for display         Today's Medication Changes          These changes are accurate as of: 17 10:52 PM.  If you have any questions, ask your nurse or doctor.               Stop taking these medicines if you haven't already. Please contact your care team if you have questions.     SE-ALECIA 19 29-1 MG Tabs   Stopped by:  Linda Small APRN CNM                    Primary Care Provider    Physician No Ref-Primary       No address on file        Equal Access to Services     MJ LAMB AH: Tomas Dillard, paula alvarezadanomi, qaybta " hal white monae berman. So [facility] [phone].    ATENCIÓN: Si vaibhav treviño, tiene a case disposición servicios gratuitos de asistencia lingüística. Hector al [phone].    We comply with applicable federal civil rights laws and [place] laws. We do not discriminate on the basis of race, color, national origin, age, disability sex, sexual orientation or gender identity.            Thank you!     Thank you for choosing [facility]  for your care. Our goal is always to provide you with excellent care. Hearing back from our patients is one way we can continue to improve our services. Please take a few minutes to complete the written survey that you may receive in the mail after your visit with us. Thank you!             Your Updated Medication List - Protect others around you: Learn how to safely use, store and throw away your medicines at www.disposemymeds.org.          This list is accurate as of: 9/1/17 10:52 PM.  Always use your most recent med list.                   Brand Name Dispense Instructions for use Diagnosis    Cholecalciferol 4000 UNITS Tabs      Take 1 tablet by mouth daily    Supervision of other normal pregnancy, antepartum       PRENATAL 19 Chew      Take 1 tablet by mouth daily

## 2017-09-01 NOTE — PROGRESS NOTES
"Subjective:      32 year old  at 19w5d presents for a routine prenatal appointment.       No vaginal bleeding or leakage of fluid.  No contractions or cramping.   Starting to feel fetal movement.       No HA, visual changes, RUQ or epigastric pain.   The patient presents with the following concerns: No new concerns. Plans 3 months leave, committed to breastfeeding.   Level II US performed, unable to adequately view heart and face and so has follow up US scheduled.   Discussed AFP: declined      Objective:  Vitals:    17 1301   BP: 104/62   Weight: 73.3 kg (161 lb 9.6 oz)   Height: 1.702 m (5' 7.01\")       See OB flowsheet    Assessment/Plan   32 year old  at 19+5 weeks     - Reviewed total weight gain, encouraged continued healthy diet and exercise.      - Reviewed why/how to contact provider.    Patient education/orders or handouts today:  Activity in pregnancy, answered questions regarding sexual activity in pregnancy.    Return to clinic in 4 weeks and prn if questions or concerns.   MAURO Adkins CNM                        "

## 2017-09-01 NOTE — NURSING NOTE
Chief Complaint   Patient presents with     Prenatal Care   19.5 week ultrasound   Repeat ultrasound 9-

## 2017-09-01 NOTE — LETTER
"2017       RE: Joao العلي  [address]  [place] [address]     Dear Colleague,    Thank you for referring your patient, Joao العلي, to the [facility] at [facility]. Please see a copy of my visit note below.    Subjective:      32 year old  at 19w5d presents for a routine prenatal appointment.       No vaginal bleeding or leakage of fluid.  No contractions or cramping.   Starting to feel fetal movement.       No HA, visual changes, RUQ or epigastric pain.   The patient presents with the following concerns: No new concerns. Plans 3 months leave, committed to breastfeeding.   Level II US performed, unable to adequately view heart and face and so has follow up US scheduled.   Discussed AFP: declined      Objective:  Vitals:    17 1301   BP: 104/62   Weight: 73.3 kg (161 lb 9.6 oz)   Height: 1.702 m (5' 7.01\")       See OB flowsheet    Assessment/Plan   32 year old  at 19+5 weeks     - Reviewed total weight gain, encouraged continued healthy diet and exercise.      - Reviewed why/how to contact provider.    Patient education/orders or handouts today:  Activity in pregnancy, answered questions regarding sexual activity in pregnancy.    Return to clinic in 4 weeks and prn if questions or concerns.   MAURO Adkins CNM                          Again, thank you for allowing me to participate in the care of your patient.      Sincerely,    MAURO Adkins CNM      "

## 2017-09-01 NOTE — LETTER
Date:September 5, 2017      Patient was self referred, no letter generated. Do not send.        [facility] Information

## 2017-09-18 ENCOUNTER — HOSPITAL ENCOUNTER (OUTPATIENT)
Dept: ULTRASOUND IMAGING | Facility: CLINIC | Age: 32
Discharge: HOME | End: 2017-09-18
Attending: OBSTETRICS & GYNECOLOGY | Admitting: OBSTETRICS & GYNECOLOGY
Payer: COMMERCIAL

## 2017-09-18 ENCOUNTER — OFFICE VISIT (OUTPATIENT)
Dept: MATERNAL FETAL MEDICINE | Facility: CLINIC | Age: 32
End: 2017-09-18
Attending: OBSTETRICS & GYNECOLOGY
Payer: COMMERCIAL

## 2017-09-18 DIAGNOSIS — Z03.73 SUSPECTED FETAL ANOMALY NOT FOUND: Primary | ICD-10-CM

## 2017-09-18 DIAGNOSIS — O35.9XX0 SUSPECTED FETAL ANOMALY, ANTEPARTUM, NOT APPLICABLE OR UNSPECIFIED FETUS: ICD-10-CM

## 2017-09-18 PROCEDURE — 76816 OB US FOLLOW-UP PER FETUS: CPT

## 2017-09-18 NOTE — PROGRESS NOTES
"Please see \"Imaging\" tab under \"Chart Review\" for details of today's visit.    Layne Cormier    "

## 2017-09-18 NOTE — MR AVS SNAPSHOT
After Visit Summary   9/18/2017    Joao العلي    MRN: [id number]           Patient Information     Date Of Birth          1985        Visit Information        Provider Department      9/18/2017 2:00 PM Layne Cormier MD [facility] Maternal Fetal Medicine [facility]        Today's Diagnoses     Suspected fetal anomaly not found    -  1       Follow-ups after your visit        Your next 10 appointments already scheduled     Sep 29, 2017  4:00 PM CDT   RETURN OB with MAURO Buckley CNM   [facility] ([facility])    [facility] [address],[place] [address]  [facility] [address] [phone]              Who to contact     If you have questions or need follow up information about today's clinic visit or your schedule please contact [employer] [facility] directly at [phone].  Normal or non-critical lab and imaging results will be communicated to you by MyChart, letter or phone within 4 business days after the clinic has received the results. If you do not hear from us within 7 days, please contact the clinic through Urjanethart or phone. If you have a critical or abnormal lab result, we will notify you by phone as soon as possible.  Submit refill requests through Ele.me or call your pharmacy and they will forward the refill request to us. Please allow 3 business days for your refill to be completed.          Additional Information About Your Visit        MyChart Information     Ele.me gives you secure access to your electronic health record. If you see a primary care provider, you can also send messages to your care team and make appointments. If you have questions, please call your primary care clinic.  If you do not have a primary care provider, please call [phone] and they will assist you.        Care EveryWhere ID     This is your Care EveryWhere ID. This could be used by other organizations  to access your [place] medical records  [id number]        Your Vitals Were     Last Period                   04/16/2017            Blood Pressure from Last 3 Encounters:   09/01/17 104/62   08/04/17 99/64   06/30/17 105/67    Weight from Last 3 Encounters:   09/01/17 73.3 kg (161 lb 9.6 oz)   08/04/17 70 kg (154 lb 6.4 oz)   06/30/17 68.9 kg (151 lb 12.8 oz)              Today, you had the following     No orders found for display       Primary Care Provider    Physician No Ref-Primary       No address on file        Equal Access to Services     [facility] CampusACPO : Hadii ree vera Somayte, waaxda kimadaha, berniceybta kaalmakami ricks, hal dawson . So [facility] [phone].    ATENCIÓN: Si habla español, tiene a case disposición servicios gratuitos de asistencia lingüística. Llame al [phone].    We comply with applicable federal civil rights laws and [place] laws. We do not discriminate on the basis of race, color, national origin, age, disability sex, sexual orientation or gender identity.            Thank you!     Thank you for choosing [facility]  for your care. Our goal is always to provide you with excellent care. Hearing back from our patients is one way we can continue to improve our services. Please take a few minutes to complete the written survey that you may receive in the mail after your visit with us. Thank you!             Your Updated Medication List - Protect others around you: Learn how to safely use, store and throw away your medicines at www.disposemymeds.org.          This list is accurate as of: 9/18/17  2:22 PM.  Always use your most recent med list.                   Brand Name Dispense Instructions for use Diagnosis    Cholecalciferol 4000 UNITS Tabs      Take 1 tablet by mouth daily    Supervision of other normal pregnancy, antepartum       PRENATAL 19 Chew      Take 1 tablet by mouth daily

## 2017-09-29 ENCOUNTER — OFFICE VISIT (OUTPATIENT)
Dept: OBGYN | Facility: CLINIC | Age: 32
End: 2017-09-29
Attending: OBSTETRICS & GYNECOLOGY
Payer: COMMERCIAL

## 2017-09-29 VITALS
WEIGHT: 166.8 LBS | BODY MASS INDEX: 26.18 KG/M2 | HEART RATE: 71 BPM | SYSTOLIC BLOOD PRESSURE: 116 MMHG | HEIGHT: 67 IN | DIASTOLIC BLOOD PRESSURE: 80 MMHG

## 2017-09-29 DIAGNOSIS — Z34.80 SUPERVISION OF OTHER NORMAL PREGNANCY, ANTEPARTUM: Primary | ICD-10-CM

## 2017-09-29 PROCEDURE — 99212 OFFICE O/P EST SF 10 MIN: CPT | Mod: ZF

## 2017-09-29 NOTE — LETTER
Date:October 2, 2017      Patient was self referred, no letter generated. Do not send.        [facility] Health Information

## 2017-09-29 NOTE — PROGRESS NOTES
"Subjective:      32 year old  at 23w5d presents for a routine prenatal appointment.      No vaginal bleeding or leakage of fluid. No contractions or cramping.   Pos fetal movement.       Has HA off/on, pt does not take any medicine, resolves spontaneously. No visual changes, RUQ or epigastric pain.   The patient presents with the following concerns:   1. Fly bites: Pt very concerned for zika exposure as she was outside of the [place] (in [place]) and experienced biting flies.  She did have a local reaction to the bites, but symptoms have resolved spontaneously.   2. 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 Denies any direct trauma to her abdomen. C/o soreness  on her side- above hip.  Pain is improving. Feeling  fetal movement. Denies any bleeding, cramping.   Her Follow up level 2 for suboptimal views was WNL.      Objective:  Vitals:    17 1605   BP: 116/80   Pulse: 71   Weight: 75.7 kg (166 lb 12.8 oz)   Height: 1.702 m (5' 7.01\")     See OB flowsheet    Assessment/Plan     Encounter Diagnosis   Name Primary?     Supervision of other normal pregnancy -  [facility] CNM Pt Yes       - Reviewed total weight gain, encouraged continued healthy diet and exercise.      - Reviewed why/how to contact provider.    Patient education/orders or handouts today:  Plan for EOB visit w labs    -Discussed that the zika virus is NOT in [place]. Pt relieved to learn this. Reviewed OTC topical meds for relief from insect bites.   -Discussed pain above hip likely muscular skeletal.  Reviewed warning signs of PTL and when to call.      Return to clinic in 4 weeks and prn if questions or concerns.   Lashon Junior, MAURO CNM              "

## 2017-09-29 NOTE — LETTER
"2017       RE: Joao العلي  [address]  [facility] [facility] [address]     Dear Colleague,    Thank you for referring your patient, Joao العلي, to the [facility] at [facility]. Please see a copy of my visit note below.    Subjective:      32 year old  at 23w5d presents for a routine prenatal appointment.      No vaginal bleeding or leakage of fluid. No contractions or cramping.   Pos fetal movement.       Has HA off/on, pt does not take any medicine, resolves spontaneously. No visual changes, RUQ or epigastric pain.   The patient presents with the following concerns:   1. Fly bites: Pt very concerned for zika exposure as she was outside of the [place] (in [place]) and experienced biting flies.  She did have a local reaction to the bites, but symptoms have resolved spontaneously.   2. 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 Denies any direct trauma to her abdomen. C/o soreness  on her side- above hip.  Pain is improving. Feeling  fetal movement. Denies any bleeding, cramping.   Her Follow up level 2 for suboptimal views was WNL.      Objective:  Vitals:    17 1605   BP: 116/80   Pulse: 71   Weight: 75.7 kg (166 lb 12.8 oz)   Height: 1.702 m (5' 7.01\")     See OB flowsheet    Assessment/Plan     Encounter Diagnosis   Name Primary?     Supervision of other normal pregnancy -  [facility] CNM Pt Yes       - Reviewed total weight gain, encouraged continued healthy diet and exercise.      - Reviewed why/how to contact provider.    Patient education/orders or handouts today:  Plan for EOB visit w labs    -Discussed that the zika virus is NOT in [place]. Pt relieved to learn this. Reviewed OTC topical meds for relief from insect bites.   -Discussed pain above hip likely muscular skeletal.  Reviewed warning signs of PTL and when to call.      Return to clinic in 4 weeks and prn if questions or " concerns.   MAURO Buckley CNM                Again, thank you for allowing me to participate in the care of your patient.      Sincerely,    MAURO Buckley CNM

## 2017-09-29 NOTE — MR AVS SNAPSHOT
After Visit Summary   2017    Joao العلي    MRN: [id number]           Patient Information     Date Of Birth          1985        Visit Information        Provider Department      2017 4:00 PM Lashon Junior APRN CNM [facility]        Today's Diagnoses     Supervision of other normal pregnancy -  [facility] MAXIMINO Pt    -  1       Follow-ups after your visit        Follow-up notes from your care team     Return in about 4 weeks (around 10/27/2017) for EOB- 30 minutes.      Your next 10 appointments already scheduled     Oct 27, 2017 10:15 AM CDT   Return Obstetrics Extended with Tara Rand CNM   [facility] ([facility])    [facility] [address]  [address] [address]  [address]  [facility] [id number] [phone]              Who to contact     Please call your clinic at [phone] to:    Ask questions about your health    Make or cancel appointments    Discuss your medicines    Learn about your test results    Speak to your doctor   If you have compliments or concerns about an experience at your clinic, or if you wish to file a complaint, please contact [facility] Patient Relations at [phone] or email us at [email or web address]         Additional Information About Your Visit        MyChart Information     Metrilust gives you secure access to your electronic health record. If you see a primary care provider, you can also send messages to your care team and make appointments. If you have questions, please call your primary care clinic.  If you do not have a primary care provider, please call [phone] and they will assist you.      [employer] is an electronic gateway that provides easy, online access to your medical records. With [employer], you can request a clinic appointment, read your test results, renew a prescription or communicate with your care  "team.     To access your existing account, please contact your [facility] or call [phone] for assistance.        Care EveryWhere ID     This is your Care EveryWhere ID. This could be used by other organizations to access your [place] medical records  [id number]        Your Vitals Were     Pulse Height Last Period BMI (Body Mass Index)          71 1.702 m (5' 7.01\") 04/16/2017 26.12 kg/m2         Blood Pressure from Last 3 Encounters:   09/29/17 116/80   09/01/17 104/62   08/04/17 99/64    Weight from Last 3 Encounters:   09/29/17 75.7 kg (166 lb 12.8 oz)   09/01/17 73.3 kg (161 lb 9.6 oz)   08/04/17 70 kg (154 lb 6.4 oz)              Today, you had the following     No orders found for display       Primary Care Provider Fax #    Physician No Ref-Primary [phone]       No address on file        Equal Access to Services     NEHA LAMB : Hadii aad ku hadasho Sosamariaali, waaxda luqadaha, qaybta kaalmada adeegyada, waxay casa dawson . So [facility] [phone].    ATENCIÓN: Si habla español, tiene a case disposición servicios gratuitos de asistencia lingüística. Llame al [phone].    We comply with applicable federal civil rights laws and [place] laws. We do not discriminate on the basis of race, color, national origin, age, disability, sex, sexual orientation, or gender identity.            Thank you!     Thank you for choosing [facility]  for your care. Our goal is always to provide you with excellent care. Hearing back from our patients is one way we can continue to improve our services. Please take a few minutes to complete the written survey that you may receive in the mail after your visit with us. Thank you!             Your Updated Medication List - Protect others around you: Learn how to safely use, store and throw away your medicines at www.disposemymeds.org.          This list is accurate as of: 9/29/17  9:05 PM.  Always " use your most recent med list.                   Brand Name Dispense Instructions for use Diagnosis    Cholecalciferol 4000 UNITS Tabs      Take 1 tablet by mouth daily    Supervision of other normal pregnancy, antepartum       PRENATAL 19 Chew      Take 1 tablet by mouth daily

## 2017-10-26 ENCOUNTER — OFFICE VISIT (OUTPATIENT)
Dept: OBGYN | Facility: CLINIC | Age: 32
End: 2017-10-26
Attending: MIDWIFE
Payer: COMMERCIAL

## 2017-10-26 VITALS
DIASTOLIC BLOOD PRESSURE: 64 MMHG | SYSTOLIC BLOOD PRESSURE: 105 MMHG | BODY MASS INDEX: 26.12 KG/M2 | HEIGHT: 67 IN | WEIGHT: 166.4 LBS

## 2017-10-26 DIAGNOSIS — Z34.80 SUPERVISION OF OTHER NORMAL PREGNANCY, ANTEPARTUM: Primary | ICD-10-CM

## 2017-10-26 PROBLEM — O21.9 NAUSEA/VOMITING IN PREGNANCY: Status: RESOLVED | Noted: 2017-06-13 | Resolved: 2017-10-26

## 2017-10-26 LAB
DEPRECATED CALCIDIOL+CALCIFEROL SERPL-MC: 40 UG/L (ref 20–75)
ERYTHROCYTE [DISTWIDTH] IN BLOOD BY AUTOMATED COUNT: 12.5 % (ref 10–15)
GLUCOSE 1H P 50 G GLC PO SERPL-MCNC: 130 MG/DL (ref 60–129)
HCT VFR BLD AUTO: 34.7 % (ref 35–47)
HGB BLD-MCNC: 11.9 G/DL (ref 11.7–15.7)
MCH RBC QN AUTO: 30.1 PG (ref 26.5–33)
MCHC RBC AUTO-ENTMCNC: 34.3 G/DL (ref 31.5–36.5)
MCV RBC AUTO: 88 FL (ref 78–100)
PLATELET # BLD AUTO: 223 10E9/L (ref 150–450)
RBC # BLD AUTO: 3.95 10E12/L (ref 3.8–5.2)
WBC # BLD AUTO: 10.7 10E9/L (ref 4–11)

## 2017-10-26 PROCEDURE — 82306 VITAMIN D 25 HYDROXY: CPT | Performed by: MIDWIFE

## 2017-10-26 PROCEDURE — 99211 OFF/OP EST MAY X REQ PHY/QHP: CPT | Mod: ZF

## 2017-10-26 PROCEDURE — 36415 COLL VENOUS BLD VENIPUNCTURE: CPT | Performed by: MIDWIFE

## 2017-10-26 PROCEDURE — 85027 COMPLETE CBC AUTOMATED: CPT | Performed by: MIDWIFE

## 2017-10-26 PROCEDURE — 86780 TREPONEMA PALLIDUM: CPT | Performed by: MIDWIFE

## 2017-10-26 PROCEDURE — 82950 GLUCOSE TEST: CPT | Performed by: MIDWIFE

## 2017-10-26 ASSESSMENT — PAIN SCALES - GENERAL: PAINLEVEL: NO PAIN (0)

## 2017-10-26 NOTE — LETTER
Date:October 27, 2017      Patient was self referred, no letter generated. Do not send.        [facility] Health Information

## 2017-10-26 NOTE — PROGRESS NOTES
32 year old, , 27w4d,   The patient presents with the following concerns:  Going well  .   No flowsheet data found.  Education completed today includes breast feeding, [facility] hand out , contraception, counting movements, signs of pre-term labor, when to present to birthplace, post partum depression and GBS.  Birth preferences reviewed: Un-Medicated  Open to options   Labor support:      Feeding plans :    Contraception planned:  None  Interested reviewed progestin only methods   The following labs were ordered today:       GCT, CBC w platelets, Vitamin D and Anti-treponema  Water birth consent form was not given.  Blood type:   ABO   Date Value Ref Range Status   2017 O  Final     RH(D)   Date Value Ref Range Status   2017  Pos  Final   , Rhogam  was notgiven.  TDAP  was not given. Will review info and decide   A/P:Supervision of normal first pregnancy in 2nd trimester   Continue scheduled prenatal care  MAURO eBntley CNM

## 2017-10-26 NOTE — LETTER
10/26/2017       RE: Joao العلي  [address]  [place] [place] [facility] [address]     Dear Colleague,    Thank you for referring your patient, Joao العلي, to the [facility]. Please see a copy of my visit note below.     32 year old, , 27w4d,   The patient presents with the following concerns:  Going well  .   No flowsheet data found.  Education completed today includes breast feeding, [facility] hand out , contraception, counting movements, signs of pre-term labor, when to present to birthplace, post partum depression and GBS.  Birth preferences reviewed: Un-Medicated  Open to options   Labor support:      Feeding plans :    Contraception planned:  None  Interested reviewed progestin only methods   The following labs were ordered today:       GCT, CBC w platelets, Vitamin D and Anti-treponema  Water birth consent form was not given.  Blood type:   ABO   Date Value Ref Range Status   2017 O  Final     RH(D)   Date Value Ref Range Status   2017  Pos  Final   , Rhogam  was notgiven.  TDAP  was not given. Will review info and decide   A/P:Supervision of normal first pregnancy in 2nd trimester   Continue scheduled prenatal care  MAURO Bentley CNM                  Again, thank you for allowing me to participate in the care of your patient.      Sincerely,    MAURO Bentley CNM

## 2017-10-26 NOTE — MR AVS SNAPSHOT
After Visit Summary   10/26/2017    Joao العلي    MRN: [id number]           Patient Information     Date Of Birth          1985        Visit Information        Provider Department      10/26/2017 11:30 AM Florina Sellers APRN CNM [facility]        Today's Diagnoses     Supervision of other normal pregnancy -  S CNM Pt    -  1       Follow-ups after your visit        Follow-up notes from your care team     Return in about 3 weeks (around 2017) for NILSA.      Who to contact     Please call your clinic at [phone] to:    Ask questions about your health    Make or cancel appointments    Discuss your medicines    Learn about your test results    Speak to your doctor   If you have compliments or concerns about an experience at your clinic, or if you wish to file a complaint, please contact [facility] Patient Relations at [phone] or email us at [email or web address]         Additional Information About Your Visit        MyChart Information     AutoMoneyBackt gives you secure access to your electronic health record. If you see a primary care provider, you can also send messages to your care team and make appointments. If you have questions, please call your primary care clinic.  If you do not have a primary care provider, please call [phone] and they will assist you.      Maginatics is an electronic gateway that provides easy, online access to your medical records. With [employer], you can request a clinic appointment, read your test results, renew a prescription or communicate with your care team.     To access your existing account, please contact your [facility] or call [phone] for assistance.        Care EveryWhere ID     This is your Care EveryWhere ID. This could be used by other organizations to access your [place] medical records  [id number]        Your Vitals Were     Height Last  "Period BMI (Body Mass Index)             1.702 m (5' 7.01\") 04/16/2017 26.06 kg/m2          Blood Pressure from Last 3 Encounters:   10/26/17 105/64   09/29/17 116/80   09/01/17 104/62    Weight from Last 3 Encounters:   10/26/17 75.5 kg (166 lb 6.4 oz)   09/29/17 75.7 kg (166 lb 12.8 oz)   09/01/17 73.3 kg (161 lb 9.6 oz)              We Performed the Following     25- [place]-Vitamin D     Anti Treponema     CBC with Platelets     Glucose 1 Hour        Primary Care Provider    Physician No Ref-Primary       NO REF-PRIMARY PHYSICIAN        Equal Access to Services     MJ LAMB : Tomas Dillard, paula matias, bhavin ricks, hal berman. So [facility] [phone].    ATENCIÓN: Si habla español, tiene a case disposición servicios gratuitos de asistencia lingüística. Llame al [phone].    We comply with applicable federal civil rights laws and [place] laws. We do not discriminate on the basis of race, color, national origin, age, disability, sex, sexual orientation, or gender identity.            Thank you!     Thank you for choosing [facility]  for your care. Our goal is always to provide you with excellent care. Hearing back from our patients is one way we can continue to improve our services. Please take a few minutes to complete the written survey that you may receive in the mail after your visit with us. Thank you!             Your Updated Medication List - Protect others around you: Learn how to safely use, store and throw away your medicines at www.disposemymeds.org.          This list is accurate as of: 10/26/17 12:13 PM.  Always use your most recent med list.                   Brand Name Dispense Instructions for use Diagnosis    Cholecalciferol 4000 UNITS Tabs      Take 1 tablet by mouth daily    Supervision of other normal pregnancy, antepartum       PRENATAL 19 Chew      Take 1 tablet by mouth daily          "

## 2017-10-27 ENCOUNTER — TELEPHONE (OUTPATIENT)
Dept: OBGYN | Facility: CLINIC | Age: 32
End: 2017-10-27

## 2017-10-27 DIAGNOSIS — O99.810 ABNORMAL MATERNAL GLUCOSE TOLERANCE, ANTEPARTUM: Primary | ICD-10-CM

## 2017-10-27 LAB — T PALLIDUM IGG+IGM SER QL: NEGATIVE

## 2017-10-27 NOTE — TELEPHONE ENCOUNTER
Spoke with patient about directions/instructions for 3 hour. Will be out of town for next 3 weeks. Will do 3 hour as soon as she is back. Order entered, appt. Made.

## 2017-11-20 ENCOUNTER — OFFICE VISIT (OUTPATIENT)
Dept: OBGYN | Facility: CLINIC | Age: 32
End: 2017-11-20
Attending: ADVANCED PRACTICE MIDWIFE
Payer: COMMERCIAL

## 2017-11-20 ENCOUNTER — HOSPITAL ENCOUNTER (OUTPATIENT)
Dept: ULTRASOUND IMAGING | Facility: CLINIC | Age: 32
Discharge: HOME | End: 2017-11-20
Attending: ADVANCED PRACTICE MIDWIFE | Admitting: ADVANCED PRACTICE MIDWIFE
Payer: COMMERCIAL

## 2017-11-20 VITALS
BODY MASS INDEX: 26.37 KG/M2 | HEART RATE: 87 BPM | SYSTOLIC BLOOD PRESSURE: 110 MMHG | WEIGHT: 168 LBS | DIASTOLIC BLOOD PRESSURE: 74 MMHG | HEIGHT: 67 IN

## 2017-11-20 DIAGNOSIS — O12.03 SWELLING OF LOWER EXTREMITY DURING PREGNANCY IN THIRD TRIMESTER: Primary | ICD-10-CM

## 2017-11-20 DIAGNOSIS — O99.810 ABNORMAL MATERNAL GLUCOSE TOLERANCE, ANTEPARTUM: ICD-10-CM

## 2017-11-20 DIAGNOSIS — O12.03 SWELLING OF LOWER EXTREMITY DURING PREGNANCY IN THIRD TRIMESTER: ICD-10-CM

## 2017-11-20 DIAGNOSIS — Z34.80 SUPERVISION OF OTHER NORMAL PREGNANCY, ANTEPARTUM: ICD-10-CM

## 2017-11-20 LAB
GLUCOSE 1H P 100 G GLC PO SERPL-MCNC: 144 MG/DL (ref 60–179)
GLUCOSE 2H P 100 G GLC PO SERPL-MCNC: 120 MG/DL (ref 60–154)
GLUCOSE 3H P 100 G GLC PO SERPL-MCNC: 113 MG/DL (ref 60–139)
GLUCOSE BLDC GLUCOMTR-MCNC: 78 MG/DL (ref 70–99)
GLUCOSE P FAST SERPL-MCNC: 80 MG/DL (ref 60–94)

## 2017-11-20 PROCEDURE — 82952 GTT-ADDED SAMPLES: CPT | Mod: 59 | Performed by: MIDWIFE

## 2017-11-20 PROCEDURE — 93971 EXTREMITY STUDY: CPT | Mod: LT

## 2017-11-20 PROCEDURE — 36415 COLL VENOUS BLD VENIPUNCTURE: CPT | Performed by: MIDWIFE

## 2017-11-20 PROCEDURE — 82962 GLUCOSE BLOOD TEST: CPT

## 2017-11-20 PROCEDURE — 99211 OFF/OP EST MAY X REQ PHY/QHP: CPT | Mod: ZF

## 2017-11-20 PROCEDURE — 82951 GLUCOSE TOLERANCE TEST (GTT): CPT | Performed by: MIDWIFE

## 2017-11-20 ASSESSMENT — PAIN SCALES - GENERAL: PAINLEVEL: NO PAIN (0)

## 2017-11-20 NOTE — MR AVS SNAPSHOT
After Visit Summary   2017    Joao العلي    MRN: [id number]           Patient Information     Date Of Birth          1985        Visit Information        Provider Department      2017 11:15 AM Patti Solis APRN CNM [facility]        Today's Diagnoses     Swelling of lower extremity during pregnancy in third trimester    -  1    Supervision of other normal pregnancy -  S CNRADHA Pt           Follow-ups after your visit        Follow-up notes from your care team     Discussed this visit Return in about 2 weeks (around 2017), or if symptoms worsen or fail to improve, for NILSA.      Your next 10 appointments already scheduled     Dec 05, 2017 11:45 AM CST   RETURN OB with MAURO Clayton CNM   [facility] ([facility])    [facility] [address] [address]  [address]  [facility] [address] [phone]              Who to contact     Please call your clinic at [phone] to:    Ask questions about your health    Make or cancel appointments    Discuss your medicines    Learn about your test results    Speak to your doctor   If you have compliments or concerns about an experience at your clinic, or if you wish to file a complaint, please contact [facility] Patient Relations at [phone] or email us at [email or web address]         Additional Information About Your Visit        MyChart Information     Adzilla gives you secure access to your electronic health record. If you see a primary care provider, you can also send messages to your care team and make appointments. If you have questions, please call your primary care clinic.  If you do not have a primary care provider, please call [phone] and they will assist you.      [employer]zilla is an electronic gateway that provides easy, online access to your medical records. With [employer], you can request  "a clinic appointment, read your test results, renew a prescription or communicate with your care team.     To access your existing account, please contact your [facility] or call [phone] for assistance.        Care EveryWhere ID     This is your Care EveryWhere ID. This could be used by other organizations to access your [place] medical records  [id number]        Your Vitals Were     Pulse Height Last Period BMI (Body Mass Index)          87 1.702 m (5' 7.01\") 04/16/2017 26.31 kg/m2         Blood Pressure from Last 3 Encounters:   12/01/17 111/76   11/20/17 110/74   10/26/17 105/64    Weight from Last 3 Encounters:   12/01/17 77.6 kg (171 lb)   11/20/17 76.2 kg (168 lb)   10/26/17 75.5 kg (166 lb 6.4 oz)               Primary Care Provider Fax #    Physician No Ref-Primary [phone]       [address] [facility] [address]  [facility] [address]        Equal Access to Services     MJ LAMB : Hadii aad ku hadasho Soomaali, waaxda luqadaha, qaybta kaalmada adeegyada, waxay leninin haymartha dawson . So [facility] [phone].    ATENCIÓN: Si habla español, tiene a case disposición servicios gratuitos de asistencia lingüística. [facility] [phone].    We comply with applicable federal civil rights laws and [place] laws. We do not discriminate on the basis of race, color, national origin, age, disability, sex, sexual orientation, or gender identity.            Thank you!     Thank you for choosing [facility]  for your care. Our goal is always to provide you with excellent care. Hearing back from our patients is one way we can continue to improve our services. Please take a few minutes to complete the written survey that you may receive in the mail after your visit with us. Thank you!             Your Updated Medication List - Protect others around you: Learn how to safely use, store and throw away your medicines at www.disposemymeds.org.          This " list is accurate as of: 11/20/17 11:59 PM.  Always use your most recent med list.                   Brand Name Dispense Instructions for use Diagnosis    Cholecalciferol 4000 UNITS Tabs      Take 1 tablet by mouth daily    Supervision of other normal pregnancy, antepartum       PRENATAL 19 Chew      Take 1 tablet by mouth daily

## 2017-11-20 NOTE — NURSING NOTE
Chief Complaint   Patient presents with     Prenatal Care   31.2 weeks  Gtt today   Has heartburn  Declines  Flu  And tdap today

## 2017-11-20 NOTE — LETTER
"2017       RE: Joao العلي  [address]  [facility] [address]     Dear Colleague,    Thank you for referring your patient, Joao العلي, to the [facility] at [facility]. Please see a copy of my visit note below.    Subjective:      32 year old  at 31w1d presentst for a routine prenatal appointment.   no vaginal bleeding or leakage of fluid.  no contractions. + fetal movement.       No HA, visual changes, RUQ or epigastric pain.   Patient concerns:   Feeling well overall.  Reviewed EOB labs with patient - 3 hour glucose in process, passed 3/4 at this time.    Reviewed TDAP Declines, planning postparutm  - Runner before pregnancy.  Now just walking.   Left thigh swelling and left calf swelling \"muslce bubble\"      Objective:  Vitals:    17 1105   BP: 110/74   Pulse: 87   Weight: 76.2 kg (168 lb)   Height: 1.702 m (5' 7.01\")   See  ob flowsheet    Left thigh with noted varicosity, NT, non reddened.  Left calf noted with [id number] cyst vs varicosity. NT, non reddened.   Assessment/Plan     Encounter Diagnoses   Name Primary?     Swelling of lower extremity during pregnancy in third trimester Yes     Supervision of other normal pregnancy -  [facility] CNM Pt      Orders Placed This Encounter   Procedures     US Lower Extremity Venous Duplex Bilateral       ABO   Date Value Ref Range Status   2017 O  Final     RH(D)   Date Value Ref Range Status   2017  Pos  Final   , Rhogam  was notgiven.  Tdap and flu shot declined.     - Reviewed total weight gain, encouraged continued healthy diet and exercise.  .  Reviewed importance of daily fetal kick count and why/how to contact provider.    - Reviewed why/how to contact provider if headache/visual changes/RUQ or epigastric pain, decreased fetal movement, vaginal bleeding, leakage of fluid or more than 4 contractions in an hour.  - Optimal positiong for labor.  - DVT vs cyst. "  US today and follow up prn.  Avoid massage. Reviewed warning s/s.      Patient education/orders or handouts today:PTL signs/symptoms, Flu shot and tdap  Reviewed GBS screening at 35-36 wks.    Return to clinic in 2 weeks and prn if questions or concerns.       Again, thank you for allowing me to participate in the care of your patient.      Sincerely,    MAURO Mack CNM

## 2017-11-20 NOTE — PROGRESS NOTES
"Subjective:      32 year old  at 31w1d presentst for a routine prenatal appointment.   no vaginal bleeding or leakage of fluid.  no contractions. + fetal movement.       No HA, visual changes, RUQ or epigastric pain.   Patient concerns:   Feeling well overall.  Reviewed EOB labs with patient - 3 hour glucose in process, passed 3/4 at this time.    Reviewed TDAP Declines, planning postparutm  - Runner before pregnancy.  Now just walking.   Left thigh swelling and left calf swelling \"muslce bubble\"      Objective:  Vitals:    17 1105   BP: 110/74   Pulse: 87   Weight: 76.2 kg (168 lb)   Height: 1.702 m (5' 7.01\")   See  ob flowsheet    Left thigh with noted varicosity, NT, non reddened.  Left calf noted with [id number] cyst vs varicosity. NT, non reddened.   Assessment/Plan     Encounter Diagnoses   Name Primary?     Swelling of lower extremity during pregnancy in third trimester Yes     Supervision of other normal pregnancy -  [facility] CNM Pt      Orders Placed This Encounter   Procedures     US Lower Extremity Venous Duplex Bilateral       ABO   Date Value Ref Range Status   2017 O  Final     RH(D)   Date Value Ref Range Status   2017  Pos  Final   , Rhogam  was notgiven.  Tdap and flu shot declined.     - Reviewed total weight gain, encouraged continued healthy diet and exercise.  .  Reviewed importance of daily fetal kick count and why/how to contact provider.    - Reviewed why/how to contact provider if headache/visual changes/RUQ or epigastric pain, decreased fetal movement, vaginal bleeding, leakage of fluid or more than 4 contractions in an hour.  - Optimal positiong for labor.  - DVT vs cyst.  US today and follow up prn.  Avoid massage. Reviewed warning s/s.      Patient education/orders or handouts today:PTL signs/symptoms, Flu shot and tdap  Reviewed GBS screening at 35-36 wks.    Return to clinic in 2 weeks and prn if questions or concerns.     Patti Solis, MAURO CNM      "

## 2017-12-01 ENCOUNTER — OFFICE VISIT (OUTPATIENT)
Dept: INTERNAL MEDICINE | Facility: CLINIC | Age: 32
End: 2017-12-01
Attending: INTERNAL MEDICINE
Payer: COMMERCIAL

## 2017-12-01 VITALS
WEIGHT: 171 LBS | HEART RATE: 86 BPM | BODY MASS INDEX: 26.78 KG/M2 | SYSTOLIC BLOOD PRESSURE: 111 MMHG | DIASTOLIC BLOOD PRESSURE: 76 MMHG

## 2017-12-01 DIAGNOSIS — I83.90 VARICOSE VEIN OF LEG: Primary | ICD-10-CM

## 2017-12-01 PROCEDURE — 99213 OFFICE O/P EST LOW 20 MIN: CPT | Mod: ZF

## 2017-12-01 ASSESSMENT — PAIN SCALES - GENERAL: PAINLEVEL: NO PAIN (0)

## 2017-12-01 NOTE — MR AVS SNAPSHOT
After Visit Summary   12/1/2017    Joao العلي    MRN: [id number]           Patient Information     Date Of Birth          1985        Visit Information        Provider Department      12/1/2017 10:30 AM Ciera Humphries MD [facility]         Today's Diagnoses     Varicose vein of leg    -  1       Follow-ups after your visit        Your next 10 appointments already scheduled     Dec 05, 2017 11:45 AM CST   RETURN OB with MAURO Clayton CNRADHA   [facility] ([facility])    [facility]  [address],[place] [address]  [address]  [facility] [id number] [phone]              Who to contact     Please call your clinic at [phone] to:    Ask questions about your health    Make or cancel appointments    Discuss your medicines    Learn about your test results    Speak to your doctor   If you have compliments or concerns about an experience at your clinic, or if you wish to file a complaint, please contact [facility] Patient Relations at [phone] or email us at [email or web address]         Additional Information About Your Visit        MyChart Information     [employer] gives you secure access to your electronic health record. If you see a primary care provider, you can also send messages to your care team and make appointments. If you have questions, please call your primary care clinic.  If you do not have a primary care provider, please call [phone] and they will assist you.      [employer] is an electronic gateway that provides easy, online access to your medical records. With [employer], you can request a clinic appointment, read your test results, renew a prescription or communicate with your care team.     To access your existing account, please contact your [facility] or call [phone] for assistance.        Care EveryWhere ID      This is your Care EveryWhere ID. This could be used by other organizations to access your [facility] medical records  [id number]        Your Vitals Were     Pulse Last Period Breastfeeding? BMI (Body Mass Index)          86 04/16/2017 No 26.78 kg/m2         Blood Pressure from Last 3 Encounters:   12/01/17 111/76   11/20/17 110/74   10/26/17 105/64    Weight from Last 3 Encounters:   12/01/17 77.6 kg (171 lb)   11/20/17 76.2 kg (168 lb)   10/26/17 75.5 kg (166 lb 6.4 oz)              Today, you had the following     No orders found for display         Today's Medication Changes          These changes are accurate as of: 12/1/17 10:49 AM.  If you have any questions, ask your nurse or doctor.               Start taking these medicines.        Dose/Directions    order for DME   Used for:  Varicose vein of leg   Started by:  Ciera Humphries MD        Equipment being ordered: Thigh high compression stocking, 20-30 mmHg; L leg   Quantity:  2 each   Refills:  0            Where to get your medicines      Some of these will need a paper prescription and others can be bought over the counter.  Ask your nurse if you have questions.     Bring a paper prescription for each of these medications     order for DME                Primary Care Provider Office Phone # Fax #    Ciera Junior [phone]       [address] [place] [address] [address]  [facility] [address]        Equal Access to Services     MJ LAMB AH: Hadii ree mileso Somayte, waaxda luqadaha, qaybta kaalmada adeegyada, wax casa berman. So [facility] [phone].    ATENCIÓN: Si habla español, tiene a case disposición servicios gratuitos de asistencia lingüística. Llame al [phone].    We comply with applicable federal civil rights laws and [place] laws. We do not discriminate on the basis of race, color, national origin, age, disability, sex, sexual orientation, or gender identity.            Thank  you!     Thank you for choosing [facility]   for your care. Our goal is always to provide you with excellent care. Hearing back from our patients is one way we can continue to improve our services. Please take a few minutes to complete the written survey that you may receive in the mail after your visit with us. Thank you!             Your Updated Medication List - Protect others around you: Learn how to safely use, store and throw away your medicines at www.disposemymeds.org.          This list is accurate as of: 12/1/17 10:49 AM.  Always use your most recent med list.                   Brand Name Dispense Instructions for use Diagnosis    Cholecalciferol 4000 UNITS Tabs      Take 1 tablet by mouth daily    Supervision of other normal pregnancy, antepartum       order for DME     2 each    Equipment being ordered: Thigh high compression stocking, 20-30 mmHg; L leg    Varicose vein of leg       PRENATAL 19 Chew      Take 1 tablet by mouth daily

## 2017-12-01 NOTE — PROGRESS NOTES
[facility]  Internal Medicine    SUBJECTIVE:  Joao العلي is a 32 year old  female at 32w5d who comes in for L leg swelling. She had some swelling behind her L knee prior to becoming pregnant while she was running. However, in recent weeks, the swelling has increased significantly. It is non-tender. It is worse when she is on her feet. It almost disappears when she elevates her feet. She had an US the other day that was negative for DVT. IT sometimes becomes uncomfortable and heavy feeling.     Past Medical History:   Diagnosis Date     Irregular periods      History reviewed. No pertinent surgical history.    Family History   Problem Relation Age of Onset     DIABETES No family hx of      Social History     Social History     Marital status: Single     Spouse name: N/A     Number of children: N/A     Years of education: N/A     Occupational History     [employer]      Social History Main Topics     Smoking status: Never Smoker     Smokeless tobacco: Never Used     Alcohol use No     Drug use: No     Sexual activity: Yes     Partners: Male     Other Topics Concern     Not on file     Social History Narrative         Medications and allergies reviewed by me today.     ROS:   Constitutional, neuro, ENT, endocrine, pulmonary, cardiac, gastrointestinal, genitourinary, musculoskeletal, integument and psychiatric systems are negative, except as otherwise noted.      OBJECTIVE:  /76  Pulse 86  Wt 77.6 kg (171 lb)  LMP 2017  Breastfeeding? No  BMI 26.78 kg/m2   Wt Readings from Last 1 Encounters:   17 77.6 kg (171 lb)     Gen: Pleasant female, in NAD  Ext: LLE with 1.5cm swelling popliteal fossa. Compressible/resolves completely with compression. Non-mobile, non-tender. Also has some distal swelling of superficial vessels. Also has dilation of vessels on anterior aspect of L thigh.      ASSESSMENT/PLAN:    Joao was seen today  for establish care/swelling behind L leg. I suspect this is also a varicose vein that is separate from the vein on the anterior aspect of her thigh, as it is entirely compressible, resolves with elevation, and there is distal venous dilation to this area. Advised wearing a compression sock for relief. If worsening, or further concern, we could obtain a venous competency study.     Diagnoses and all orders for this visit:    Varicose vein of leg  -     order for DME; Equipment being ordered:  Thigh high compression stocking, 20-30 mmHg; L leg         Pt should return to clinic for f/u with me in PRN      Ciera Junior MD  12/01/17

## 2017-12-04 ENCOUNTER — HOSPITAL ENCOUNTER (OUTPATIENT)
Facility: CLINIC | Age: 32
Discharge: HOME | End: 2017-12-04
Attending: OBSTETRICS & GYNECOLOGY | Admitting: OBSTETRICS & GYNECOLOGY
Payer: COMMERCIAL

## 2017-12-04 VITALS
DIASTOLIC BLOOD PRESSURE: 71 MMHG | WEIGHT: 168 LBS | SYSTOLIC BLOOD PRESSURE: 110 MMHG | HEIGHT: 67 IN | TEMPERATURE: 97.8 F | BODY MASS INDEX: 26.37 KG/M2
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
## 2017-12-04 PROCEDURE — 59025 FETAL NON-STRESS TEST: CPT

## 2017-12-04 PROCEDURE — 99214 OFFICE O/P EST MOD 30 MIN: CPT | Mod: 25

## 2017-12-04 PROCEDURE — 99213 OFFICE O/P EST LOW 20 MIN: CPT | Mod: 25

## 2017-12-04 PROCEDURE — 59025 FETAL NON-STRESS TEST: CPT | Mod: 26 | Performed by: OBSTETRICS & GYNECOLOGY

## 2017-12-04 RX ORDER — ONDANSETRON 2 MG/ML
4 INJECTION INTRAMUSCULAR; INTRAVENOUS EVERY 6 HOURS PRN
Status: DISCONTINUED | OUTPATIENT
Start: 2017-12-04 | End: 2017-12-05 | Stop reason: HOSPADM

## 2017-12-04 NOTE — IP AVS SNAPSHOT
[facility] Labor and Delivery    [address]    [facility] [address]    Phone:  [phone]    Fax:  [phone]                                       After Visit Summary   12/4/2017    Joao العلي    MRN: [id number]           After Visit Summary Signature Page     I have received my discharge instructions, and my questions have been answered. I have discussed any challenges I see with this plan with the nurse or doctor.    ..........................................................................................................................................  Patient/Patient Representative Signature      ..........................................................................................................................................  Patient Representative Print Name and Relationship to Patient    ..................................................               ................................................  Date                                            Time    ..........................................................................................................................................  Reviewed by Signature/Title    ...................................................              ..............................................  Date                                                            Time
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
## 2017-12-05 ENCOUNTER — OFFICE VISIT (OUTPATIENT)
Dept: OBGYN | Facility: CLINIC | Age: 32
End: 2017-12-05
Attending: ADVANCED PRACTICE MIDWIFE
Payer: COMMERCIAL

## 2017-12-05 VITALS
BODY MASS INDEX: 26.98 KG/M2 | HEIGHT: 67 IN | WEIGHT: 171.9 LBS | SYSTOLIC BLOOD PRESSURE: 112 MMHG | DIASTOLIC BLOOD PRESSURE: 75 MMHG | HEART RATE: 90 BPM

## 2017-12-05 DIAGNOSIS — Z34.80 SUPERVISION OF OTHER NORMAL PREGNANCY, ANTEPARTUM: Primary | ICD-10-CM

## 2017-12-05 PROCEDURE — 99211 OFF/OP EST MAY X REQ PHY/QHP: CPT | Mod: ZF
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
## 2017-12-05 NOTE — MR AVS SNAPSHOT
After Visit Summary   2017    Joao العلي    MRN: [id number]           Patient Information     Date Of Birth          1985        Visit Information        Provider Department      2017 11:45 AM Yaw Abbott APRN CNM [facility]        Today's Diagnoses     Supervision of other normal pregnancy -  S CNM Pt    -  1       Follow-ups after your visit        Follow-up notes from your care team     Return in about 2 weeks (around 2017) for NILSA Visit.      Who to contact     Please call your clinic at [phone] to:    Ask questions about your health    Make or cancel appointments    Discuss your medicines    Learn about your test results    Speak to your doctor   If you have compliments or concerns about an experience at your clinic, or if you wish to file a complaint, please contact [facility] Patient Relations at [phone] or email us at [email or web address]         Additional Information About Your Visit        MyChart Information     E[employer]et gives you secure access to your electronic health record. If you see a primary care provider, you can also send messages to your care team and make appointments. If you have questions, please call your primary care clinic.  If you do not have a primary care provider, please call [phone] and they will assist you.      Humanco is an electronic gateway that provides easy, online access to your medical records. With Humanco, you can request a clinic appointment, read your test results, renew a prescription or communicate with your care team.     To access your existing account, please contact your [facility] or call [phone] for assistance.        Care EveryWhere ID     This is your Care EveryWhere ID. This could be used by other organizations to access your [place] medical records  [id number]        Your Vitals Were     Pulse  "Height Last Period BMI (Body Mass Index)          90 1.702 m (5' 7.01\") 04/16/2017 26.92 kg/m2         Blood Pressure from Last 3 Encounters:   12/05/17 112/75   12/04/17 110/71   12/01/17 111/76    Weight from Last 3 Encounters:   12/05/17 78 kg (171 lb 14.4 oz)   12/04/17 76.2 kg (168 lb)   12/01/17 77.6 kg (171 lb)              Today, you had the following     No orders found for display       Primary Care Provider Office Phone # Fax #    Ciera Carrie Junior [phone]       [address] [place] [address] [address] [place] [address]        Equal Access to Services     [facility] : Tomas mileso Somayte, waaxda luqadaha, qaybta kaalmada adeegyada, hal dawson . So [facility] [phone].    ATENCIÓN: Si habla español, tiene a case disposición servicios gratuitos de asistencia lingüística. Llame al [phone].    We comply with applicable federal civil rights laws and [place] laws. We do not discriminate on the basis of race, color, national origin, age, disability, sex, sexual orientation, or gender identity.            Thank you!     Thank you for choosing [facility]  for your care. Our goal is always to provide you with excellent care. Hearing back from our patients is one way we can continue to improve our services. Please take a few minutes to complete the written survey that you may receive in the mail after your visit with us. Thank you!             Your Updated Medication List - Protect others around you: Learn how to safely use, store and throw away your medicines at www.disposemymeds.org.          This list is accurate as of: 12/5/17 12:29 PM.  Always use your most recent med list.                   Brand Name Dispense Instructions for use Diagnosis    Cholecalciferol 4000 UNITS Tabs      Take 1 tablet by mouth daily    Supervision of other normal pregnancy, antepartum       order for DME     2 each    Equipment being ordered: Thigh " high compression stocking, 20-30 mmHg; L leg    Varicose vein of leg       PRENATAL 19 Chew      Take 1 tablet by mouth daily
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
## 2017-12-21 ENCOUNTER — OFFICE VISIT (OUTPATIENT)
Dept: OBGYN | Facility: CLINIC | Age: 32
End: 2017-12-21
Attending: ADVANCED PRACTICE MIDWIFE
Payer: COMMERCIAL

## 2017-12-21 VITALS
SYSTOLIC BLOOD PRESSURE: 125 MMHG | HEIGHT: 67 IN | BODY MASS INDEX: 27.58 KG/M2 | HEART RATE: 74 BPM | DIASTOLIC BLOOD PRESSURE: 80 MMHG | WEIGHT: 175.7 LBS

## 2017-12-21 DIAGNOSIS — O26.843 FUNDAL HEIGHT LOW FOR DATES IN THIRD TRIMESTER: ICD-10-CM

## 2017-12-21 DIAGNOSIS — Z34.80 SUPERVISION OF OTHER NORMAL PREGNANCY, ANTEPARTUM: Primary | ICD-10-CM

## 2017-12-21 PROCEDURE — 99211 OFF/OP EST MAY X REQ PHY/QHP: CPT | Mod: ZF

## 2017-12-21 NOTE — MR AVS SNAPSHOT
After Visit Summary   2017    Joao العلي    MRN: [id number]           Patient Information     Date Of Birth          1985        Visit Information        Provider Department      2017 1:45 PM Heide Fink APRN CN [facility]        Today's Diagnoses     Supervision of other normal pregnancy -  [facility] MAXIMINO Pt    -  1    Fundal height low for dates in third trimester           Follow-ups after your visit        Follow-up notes from your care team     Return in about 1 week (around 2017) for Return OB.      Your next 10 appointments already scheduled     Dec 28, 2017 10:45 AM CST   RETURN OB with Tara Rand CNM   [facility] ([facility])    [facility] [address] [address]  [address]  [facility] [id number] [phone]              Who to contact     Please call your clinic at [phone] to:    Ask questions about your health    Make or cancel appointments    Discuss your medicines    Learn about your test results    Speak to your doctor   If you have compliments or concerns about an experience at your clinic, or if you wish to file a complaint, please contact [facility] Patient Relations at [phone] or email us at [email or web address]         Additional Information About Your Visit        MyChart Information     [employer] Solutions[employer] gives you secure access to your electronic health record. If you see a primary care provider, you can also send messages to your care team and make appointments. If you have questions, please call your primary care clinic.  If you do not have a primary care provider, please call [phone] and they will assist you.      Netero is an electronic gateway that provides easy, online access to your medical records. With Netero, you can request a clinic appointment, read your test results, renew a prescription  "or communicate with your care team.     To access your existing account, please contact your [facility] or call [phone] for assistance.        Care EveryWhere ID     This is your Care EveryWhere ID. This could be used by other organizations to access your [place] medical records  [id number]        Your Vitals Were     Pulse Height Last Period BMI (Body Mass Index)          74 1.702 m (5' 7.01\") 04/16/2017 27.51 kg/m2         Blood Pressure from Last 3 Encounters:   12/21/17 125/80   12/05/17 112/75   12/04/17 110/71    Weight from Last 3 Encounters:   12/21/17 79.7 kg (175 lb 11.2 oz)   12/05/17 78 kg (171 lb 14.4 oz)   12/04/17 76.2 kg (168 lb)              Today, you had the following     No orders found for display       Primary Care Provider Office Phone # Fax #    Ciera Carrie Junior [phone]       [address] [place] [address]  [facility] [address]        Equal Access to Services     [facility]: Hadii aad ku hadasho Somayte, waaxda luqadaha, qaybta kaalmada adecata, hal dawson . So [facility] [phone].    ATENCIÓN: Si habla español, tiene a case disposición servicios gratuitos de asistencia lingüística. [facility] [phone].    We comply with applicable federal civil rights laws and [place] laws. We do not discriminate on the basis of race, color, national origin, age, disability, sex, sexual orientation, or gender identity.            Thank you!     Thank you for choosing [facility]  for your care. Our goal is always to provide you with excellent care. Hearing back from our patients is one way we can continue to improve our services. Please take a few minutes to complete the written survey that you may receive in the mail after your visit with us. Thank you!             Your Updated Medication List - Protect others around you: Learn how to safely use, store and throw away your medicines at " www.disposemymeds.org.          This list is accurate as of: 12/21/17  2:25 PM.  Always use your most recent med list.                   Brand Name Dispense Instructions for use Diagnosis    Cholecalciferol 4000 UNITS Tabs      Take 1 tablet by mouth daily    Supervision of other normal pregnancy, antepartum       order for DME     2 each    Equipment being ordered: Thigh high compression stocking, 20-30 mmHg; L leg    Varicose vein of leg       PRENATAL 19 Chew      Take 1 tablet by mouth daily

## 2017-12-21 NOTE — PROGRESS NOTES
"Subjective:      32 year old  at 35w4d presents for a routine prenatal appointment.         No vaginal bleeding,  leakage of fluid, or change in vaginal discharge.  Occasional BH contractions.  Normal daily fetal movement.  No HA, visual changes, RUQ or epigastric pain.   Patient concerns: Feeling well overall. Questions about birth preferences - discussed that skin to skin, DCC, non-separation, delayed bath are all routine care.     Objective:  Vitals:    17 1346   BP: 125/80   Pulse: 74   Weight: 79.7 kg (175 lb 11.2 oz)   Height: 1.702 m (5' 7.01\")    See OB flowsheet    Assessment/Plan     Encounter Diagnoses   Name Primary?     Supervision of other normal pregnancy -  [facility] CNM Pt Yes     Fundal height low for dates in third trimester      GBS screening: collect at next visit.   Birth preferences reviewed: Un-Medicated, does not desire water birth but would like hydrotherapy. Very important to Joao that she can be mobile in labor. Discussed ball, sling, cub, tele monitoring and IA.   Labor signs discussed. Reinforced daily fetal movement counts.  Reviewed why/how to contact provider if headache/visual changes/RUQ or epigastric pain, decreased fetal movement, vaginal bleeding, leakage of fluid.     Return to clinic in 1 week and prn if questions or concerns. GBS and CBC at next visit. Growth/CORNELIUS if S<D in 1 week.     MAURO FernandesM  "

## 2017-12-21 NOTE — LETTER
"2017       RE: Joao العلي  [address]  [place] [address]     Dear Colleague,    Thank you for referring your patient, Joao العلي, to the [facility] at [facility]. Please see a copy of my visit note below.    Subjective:      32 year old  at 35w4d presents for a routine prenatal appointment.         No vaginal bleeding,  leakage of fluid, or change in vaginal discharge.  Occasional BH contractions.  Normal daily fetal movement.  No HA, visual changes, RUQ or epigastric pain.   Patient concerns: Feeling well overall. Questions about birth preferences - discussed that skin to skin, DCC, non-separation, delayed bath are all routine care.     Objective:  Vitals:    17 1346   BP: 125/80   Pulse: 74   Weight: 79.7 kg (175 lb 11.2 oz)   Height: 1.702 m (5' 7.01\")    See OB flowsheet    Assessment/Plan     Encounter Diagnoses   Name Primary?     Supervision of other normal pregnancy -  [facility] CNM Pt Yes     Fundal height low for dates in third trimester      GBS screening: collect at next visit.   Birth preferences reviewed: Un-Medicated, does not desire water birth but would like hydrotherapy. Very important to Joao that she can be mobile in labor. Discussed ball, sling, cub, tele monitoring and IA.   Labor signs discussed. Reinforced daily fetal movement counts.  Reviewed why/how to contact provider if headache/visual changes/RUQ or epigastric pain, decreased fetal movement, vaginal bleeding, leakage of fluid.     Return to clinic in 1 week and prn if questions or concerns. GBS and CBC at next visit. Growth/CORNELIUS if S<D in 1 week.     Heide Fink, MAURO CNM    "

## 2017-12-28 ENCOUNTER — OFFICE VISIT (OUTPATIENT)
Dept: OBGYN | Facility: CLINIC | Age: 32
End: 2017-12-28
Attending: ADVANCED PRACTICE MIDWIFE
Payer: COMMERCIAL

## 2017-12-28 ENCOUNTER — HOSPITAL ENCOUNTER (OUTPATIENT)
Dept: ULTRASOUND IMAGING | Facility: CLINIC | Age: 32
Discharge: HOME | End: 2017-12-28
Attending: ADVANCED PRACTICE MIDWIFE | Admitting: OBSTETRICS & GYNECOLOGY
Payer: COMMERCIAL

## 2017-12-28 ENCOUNTER — OFFICE VISIT (OUTPATIENT)
Dept: MATERNAL FETAL MEDICINE | Facility: CLINIC | Age: 32
End: 2017-12-28
Attending: ADVANCED PRACTICE MIDWIFE
Payer: COMMERCIAL

## 2017-12-28 VITALS
SYSTOLIC BLOOD PRESSURE: 105 MMHG | DIASTOLIC BLOOD PRESSURE: 74 MMHG | BODY MASS INDEX: 28 KG/M2 | HEART RATE: 72 BPM | WEIGHT: 178.4 LBS | HEIGHT: 67 IN

## 2017-12-28 DIAGNOSIS — Z34.80 SUPERVISION OF OTHER NORMAL PREGNANCY, ANTEPARTUM: Primary | ICD-10-CM

## 2017-12-28 DIAGNOSIS — O36.5930 POOR FETAL GROWTH AFFECTING MANAGEMENT OF MOTHER IN THIRD TRIMESTER, SINGLE OR UNSPECIFIED FETUS: ICD-10-CM

## 2017-12-28 DIAGNOSIS — O26.90 PREGNANCY RELATED CONDITION, UNSPECIFIED TRIMESTER: ICD-10-CM

## 2017-12-28 DIAGNOSIS — Z36.4 ENCOUNTER FOR ANTENATAL SCREENING FOR FETAL GROWTH RETARDATION: ICD-10-CM

## 2017-12-28 DIAGNOSIS — O26.843 FUNDAL HEIGHT LOW FOR DATES IN THIRD TRIMESTER: ICD-10-CM

## 2017-12-28 DIAGNOSIS — O36.5930 POOR FETAL GROWTH AFFECTING MANAGEMENT OF MOTHER IN THIRD TRIMESTER, SINGLE OR UNSPECIFIED FETUS: Primary | ICD-10-CM

## 2017-12-28 DIAGNOSIS — O35.9XX0 SUSPECTED FETAL ANOMALY, ANTEPARTUM, NOT APPLICABLE OR UNSPECIFIED FETUS: Primary | ICD-10-CM

## 2017-12-28 LAB
ERYTHROCYTE [DISTWIDTH] IN BLOOD BY AUTOMATED COUNT: 12.4 % (ref 10–15)
HCT VFR BLD AUTO: 37.6 % (ref 35–47)
HGB BLD-MCNC: 12.6 G/DL (ref 11.7–15.7)
MCH RBC QN AUTO: 29.5 PG (ref 26.5–33)
MCHC RBC AUTO-ENTMCNC: 33.5 G/DL (ref 31.5–36.5)
MCV RBC AUTO: 88 FL (ref 78–100)
PLATELET # BLD AUTO: 183 10E9/L (ref 150–450)
RBC # BLD AUTO: 4.27 10E12/L (ref 3.8–5.2)
WBC # BLD AUTO: 10.9 10E9/L (ref 4–11)

## 2017-12-28 PROCEDURE — 76819 FETAL BIOPHYS PROFIL W/O NST: CPT | Performed by: OBSTETRICS & GYNECOLOGY

## 2017-12-28 PROCEDURE — 76819 FETAL BIOPHYS PROFIL W/O NST: CPT | Mod: ZF

## 2017-12-28 PROCEDURE — 85027 COMPLETE CBC AUTOMATED: CPT | Performed by: ADVANCED PRACTICE MIDWIFE

## 2017-12-28 PROCEDURE — 87653 STREP B DNA AMP PROBE: CPT | Performed by: ADVANCED PRACTICE MIDWIFE

## 2017-12-28 PROCEDURE — 76821 MIDDLE CEREBRAL ARTERY ECHO: CPT

## 2017-12-28 PROCEDURE — 76816 OB US FOLLOW-UP PER FETUS: CPT | Mod: ZF

## 2017-12-28 PROCEDURE — 99211 OFF/OP EST MAY X REQ PHY/QHP: CPT | Mod: ZF

## 2017-12-28 PROCEDURE — 76811 OB US DETAILED SNGL FETUS: CPT

## 2017-12-28 PROCEDURE — 36415 COLL VENOUS BLD VENIPUNCTURE: CPT | Performed by: ADVANCED PRACTICE MIDWIFE

## 2017-12-28 PROCEDURE — 76820 UMBILICAL ARTERY ECHO: CPT

## 2017-12-28 NOTE — PROGRESS NOTES
32 year old female, , presents at 36 4/7 weeks for obstetric ultrasound assessment indicated by size less than dates.      USEGA = 34 5/7 weeks.  EFW = 2,513 grams, 17 % for 36 weeks. BPD - 3%, HC <2%, AC - 3%, FL - 63%.      Fetal Breathing Movements (FBM): Normal - 2  Gross Body Movements (GBM): Normal - 2  Fetal Tone (FT): Normal - 2  AFV: Pocket of amniotic fluid > or = to 2 cm x 2 cm - 2  Quantitative Amniotic Fluid Volume Total: 15.3 cm      BPP .  UAR = 2.38 Normal.   MCA Not done.  NST Not done.     FHR = 146bpm    Single fetus in cephalic presentation.  Placenta anterior and grade 1.      Comments: Composite growth is 17th%ile but HC and AC c/w IUGR.  UAR is normal today.  Recommend twice weekly BPPs with UAR, repeat growth in 3 weeks if undelivered by then.  Pt was seen after this visit for follow up comp u/s in [facility] clinic.        LAVERN Morgan MD, FACOG

## 2017-12-28 NOTE — LETTER
"2017       RE: Joao العلي  [address]  [place] [address]     Dear Colleague,    Thank you for referring your patient, Joao العلي, to the [facility] at [facility]. Please see a copy of my visit note below.      Subjective:      32 year old  at 36w4d presents for a routine prenatal appointment.    Patient concerns: Feeling well. Feels occasional B-H ctx. Denies regular contractions, vaginal bleeding,  leakage of fluid, or change in vaginal discharge. Reports +fetal movement.  No HA, visual changes, RUQ or epigastric pain.     Objective:  Vitals:    17 1043   BP: 105/74   Pulse: 72   Weight: 80.9 kg (178 lb 6.4 oz)   Height: 1.702 m (5' 7\")      See OB flowsheet    Assessment/Plan     Encounter Diagnosis   Name Primary?     Supervision of other normal pregnancy -  [facility] CNM Pt Yes     Orders Placed This Encounter   Procedures     CBC with Platelets     Labor signs discussed. Reinforced daily fetal movement counts.  Reviewed why/how to contact provider if headache/visual changes/RUQ or epigastric pain, decreased fetal movement, vaginal bleeding, leakage of fluid.    FH < dates. Growth ultrasound ordered.  GBS collected.  CBC with plts ordered.   Continue scheduled prenatal care, RTC in 1 week and prn if questions or concerns.     ADDENDUM:    -Growth ultrasound done today in clinic.    - EFW 17%tile, bpd and ac <3%tile, hc <2%tile, fl 63%tile. Doppler wnl.   - Consulted with Dr. King who recommended formal u/s by MFM.   - Called MFM and ultrasound scheduled for 3:00 pm.  - Per MFM, 19%tile, MFM ultrasound BPP   Recommended weekly BPPs.           MAURO Mattson CNM  "

## 2017-12-28 NOTE — LETTER
2017       RE: Joao العلي  [address]  [place] [address]     Dear Colleague,    Thank you for referring your patient, Joao العلي, to the [facility] at [facility]. Please see a copy of my visit note below.    32 year old female, , presents at 36 4/7 weeks for obstetric ultrasound assessment indicated by size less than dates.    USEGA = 34 5/7 weeks.  EFW = 2,513 grams, 17 % for 36 weeks. BPD - 3%, HC <2%, AC - 3%, FL - 63%.    Fetal Breathing Movements (FBM): Normal - 2  Gross Body Movements (GBM): Normal - 2  Fetal Tone (FT): Normal - 2  AFV: Pocket of amniotic fluid > or = to 2 cm x 2 cm - 2  Quantitative Amniotic Fluid Volume Total: 15.3 cm    BPP 8/8.  UAR = 2.38 Normal.   MCA Not done.  NST Not done.     FHR = 146bpm    Single fetus in cephalic presentation.  Placenta anterior and grade 1.    Comments: Composite growth is 17th%ile but HC and AC c/w IUGR.  UAR is normal today.  Recommend twice weekly BPPs with UAR, repeat growth in 3 weeks if undelivered by then.  Pt was seen after this visit for follow up comp u/s in MFM clinic.      LAVERN Morgan MD, FACOG

## 2017-12-28 NOTE — MR AVS SNAPSHOT
After Visit Summary   2017    Joao العلي    MRN: [id number]           Patient Information     Date Of Birth          1985        Visit Information        Provider Department      2017 10:45 AM Tara Rand CNM [facility]        Today's Diagnoses     Supervision of other normal pregnancy -  [facility] CNM Pt    -  1    Poor fetal growth affecting management of mother in third trimester, single or unspecified fetus        Fundal height low for dates in third trimester           Follow-ups after your visit        Additional Services     MAT FETAL MED CTR REFERRAL-PREGNANCY       Please evaluate growth. Growth u/s initially done for FH < dates. U/S today at  [facility]: BPD 3%, HC <2%, AC 3%, FL 63%tile, overall 17%tile.        >> Patient may proceed with recommendations for further testing as directed by the Maternal Fetal Medicine Specialist >>    >> If requesting Fetal Echo: MFM will determine appropriate location for exam due to indication.    >> If requesting Lung Maturity Amnio:  If results indicate fetal lung maturity, induction or C/S is recommended within 36 hours.  Please schedule accordingly.     Dear Patient:   Please be aware that coverage of these services is subject to the terms and limitations of your health insurance plan.  Call member services at your health plan with any benefit or coverage questions.      Please bring the following to your appointment:    >>  Any x-rays, CTs or MRIs which have been performed.  Contact the facility where they were done to arrange for  prior to your scheduled appointment.  Any new CT, MRI or other procedures ordered by your specialist must be performed at a [place] facility or coordinated by your clinic's referral office.  >>  List of current medications   >>  This referral request   >>  Any documents/labs given to you for this referral                  Follow-up notes from your care team   "   Return in about 1 week (around 1/4/2018) for NILSA- CNM; growth u/s today.      Who to contact     Please call your clinic at [phone] to:    Ask questions about your health    Make or cancel appointments    Discuss your medicines    Learn about your test results    Speak to your doctor   If you have compliments or concerns about an experience at your clinic, or if you wish to file a complaint, please contact [facility] Patient Relations at [phone] or email us at [email or web address]         Additional Information About Your Visit        [employer] Information     Zingku gives you secure access to your electronic health record. If you see a primary care provider, you can also send messages to your care team and make appointments. If you have questions, please call your primary care clinic.  If you do not have a primary care provider, please call [phone] and they will assist you.      Zingku is an electronic gateway that provides easy, online access to your medical records. With Zingku, you can request a clinic appointment, read your test results, renew a prescription or communicate with your care team.     To access your existing account, please contact your [facility] or call [phone] for assistance.        Care EveryWhere ID     This is your Care EveryWhere ID. This could be used by other organizations to access your [place] medical records  [id number]        Your Vitals Were     Pulse Height Last Period BMI (Body Mass Index)          72 1.702 m (5' 7\") 04/16/2017 27.94 kg/m2         Blood Pressure from Last 3 Encounters:   12/28/17 105/74   12/21/17 125/80   12/05/17 112/75    Weight from Last 3 Encounters:   12/28/17 80.9 kg (178 lb 6.4 oz)   12/21/17 79.7 kg (175 lb 11.2 oz)   12/05/17 78 kg (171 lb 14.4 oz)              We Performed the Following     CBC with Platelets     Group B strep PCR     MAT FETAL MED CTR " REFERRAL-PREGNANCY        Primary Care Provider Office Phone # Fax #    Ciera Carrie Funmilayo Junior [phone]       [address] [place] [address] 741  [facility] [address]        Equal Access to Services     MJ LAMB : Tomas kitchen jdo Sosamariaali, waaxda luqadaha, qaybta kaalmada adeegyada, hal licona samir berman. So [facility] [phone].    ATENCIÓN: Si habla español, tiene a case disposición servicios gratuitos de asistencia lingüística. Llame al [phone].    We comply with applicable federal civil rights laws and [place] laws. We do not discriminate on the basis of race, color, national origin, age, disability, sex, sexual orientation, or gender identity.            Thank you!     Thank you for choosing [facility]  for your care. Our goal is always to provide you with excellent care. Hearing back from our patients is one way we can continue to improve our services. Please take a few minutes to complete the written survey that you may receive in the mail after your visit with us. Thank you!             Your Updated Medication List - Protect others around you: Learn how to safely use, store and throw away your medicines at www.disposemymeds.org.          This list is accurate as of: 12/28/17 11:59 PM.  Always use your most recent med list.                   Brand Name Dispense Instructions for use Diagnosis    Cholecalciferol 4000 UNITS Tabs      Take 1 tablet by mouth daily    Supervision of other normal pregnancy, antepartum       order for DME     2 each    Equipment being ordered: Thigh high compression stocking, 20-30 mmHg; L leg    Varicose vein of leg       PRENATAL 19 Chew      Take 1 tablet by mouth daily

## 2017-12-28 NOTE — PROGRESS NOTES
"  Subjective:      32 year old  at 36w4d presents for a routine prenatal appointment.    Patient concerns: Feeling well. Feels occasional B-H ctx. Denies regular contractions, vaginal bleeding,  leakage of fluid, or change in vaginal discharge. Reports +fetal movement.  No HA, visual changes, RUQ or epigastric pain.     Objective:  Vitals:    17 1043   BP: 105/74   Pulse: 72   Weight: 80.9 kg (178 lb 6.4 oz)   Height: 1.702 m (5' 7\")      See OB flowsheet    Assessment/Plan     Encounter Diagnosis   Name Primary?     Supervision of other normal pregnancy -  [facility] CNM Pt Yes     Orders Placed This Encounter   Procedures     CBC with Platelets     Labor signs discussed. Reinforced daily fetal movement counts.  Reviewed why/how to contact provider if headache/visual changes/RUQ or epigastric pain, decreased fetal movement, vaginal bleeding, leakage of fluid.    FH < dates. Growth ultrasound ordered.  GBS collected.  CBC with plts ordered.   Continue scheduled prenatal care, RTC in 1 week and prn if questions or concerns.     MAURO Mattson, MAXIMINO    ADDENDUM:    -Growth ultrasound done today in clinic.    - EFW 17%tile, bpd and ac <3%tile, hc <2%tile, fl 63%tile. Doppler wnl.   - Consulted with Dr. King who recommended formal u/s by M.   - Called MFM and ultrasound scheduled for 3:00 pm.  - Per MFM, 19%tile, MFM ultrasound BPP   Recommended weekly BPPs.   "

## 2017-12-28 NOTE — MR AVS SNAPSHOT
After Visit Summary   12/28/2017    Joao العلي    MRN: [id number]           Patient Information     Date Of Birth          1985        Visit Information        Provider Department      12/28/2017 11:30 AM [facility] ULTRASOUND II [facility]        Today's Diagnoses     Poor fetal growth affecting management of mother in third trimester, single or unspecified fetus    -  1    Fundal height low for dates in third trimester           Follow-ups after your visit        Your next 10 appointments already scheduled     Dec 28, 2017  3:00 PM CST   (Arrive by 2:45 PM)   MF US COMP with [id number]   eal Maternal Fetal Medicine Ultrasound - [facility])    [address]  [facility] [id number] [phone]           Wear comfortable clothes and leave your valuables at home.            Dec 28, 2017  3:30 PM CST   Radiology MD with UR JAZZY CARL   [facility] Maternal Fetal Medicine - [facility])    [address]  [facility] [address]   [phone]           Please arrive at the time given for your first appointment. This visit is used internally to schedule the physician's time during your ultrasound.              Future tests that were ordered for you today     Open Standing Orders        Priority Remaining Interval Expires Ordered    BPP (Single) w/out NST (In Clinic) Routine 3/4  4/27/2018 12/28/2017          Open Future Orders        Priority Expected Expires Ordered    [facility] US Comprehensive Single Routine  10/28/2018 12/28/2017            Who to contact     Please call your clinic at [phone] to:    Ask questions about your health    Make or cancel appointments    Discuss your medicines    Learn about your test results    Speak to your doctor   If you have compliments or concerns about an experience at your clinic, or if you wish to file a complaint, please contact  [facility] Patient Relations at [phone] or email us at [email or web address]         Additional Information About Your Visit        [employer] Information     Mister Mariohart gives you secure access to your electronic health record. If you see a primary care provider, you can also send messages to your care team and make appointments. If you have questions, please call your primary care clinic.  If you do not have a primary care provider, please call [phone] and they will assist you.      Avolent is an electronic gateway that provides easy, online access to your medical records. With Avolent, you can request a clinic appointment, read your test results, renew a prescription or communicate with your care team.     To access your existing account, please contact your [facility] or call [phone] for assistance.        Care EveryWhere ID     This is your Care EveryWhere ID. This could be used by other organizations to access your [place] medical records  [id number]        Your Vitals Were     Last Period                   04/16/2017            Blood Pressure from Last 3 Encounters:   12/28/17 105/74   12/21/17 125/80   12/05/17 112/75    Weight from Last 3 Encounters:   12/28/17 80.9 kg (178 lb 6.4 oz)   12/21/17 79.7 kg (175 lb 11.2 oz)   12/05/17 78 kg (171 lb 14.4 oz)              We Performed the Following     Growth Ultrasound [address]        Primary Care Provider Office Phone # Fax #    Ciera Carrie Junior [phone]       [address] [place] [address] 743  [facility] [address]        Equal Access to Services     [facility] : Hadii aad ku hadasho Soomaali, waaxda luqadaha, qaybta kaalmada adeegyakami, hal berman. So [facility] [phone].    ATENCIÓN: Si habla español, tiene a case disposición servicios gratuitos de asistencia lingüística. Llame al [phone].    We comply with applicable federal  civil rights laws and [place] laws. We do not discriminate on the basis of race, color, national origin, age, disability, sex, sexual orientation, or gender identity.            Thank you!     Thank you for choosing [facility]  for your care. Our goal is always to provide you with excellent care. Hearing back from our patients is one way we can continue to improve our services. Please take a few minutes to complete the written survey that you may receive in the mail after your visit with us. Thank you!             Your Updated Medication List - Protect others around you: Learn how to safely use, store and throw away your medicines at www.disposemymeds.org.          This list is accurate as of: 12/28/17  2:31 PM.  Always use your most recent med list.                   Brand Name Dispense Instructions for use Diagnosis    Cholecalciferol 4000 UNITS Tabs      Take 1 tablet by mouth daily    Supervision of other normal pregnancy, antepartum       order for DME     2 each    Equipment being ordered: Thigh high compression stocking, 20-30 mmHg; L leg    Varicose vein of leg       PRENATAL 19 Chew      Take 1 tablet by mouth daily

## 2017-12-28 NOTE — MR AVS SNAPSHOT
After Visit Summary   2017    Joao العلي    MRN: [id number]           Patient Information     Date Of Birth          1985        Visit Information        Provider Department      2017 3:30 PM Bryon Watson MD [facility] Maternal Fetal Medicine [facility]        Today's Diagnoses     Suspected fetal anomaly, antepartum, not applicable or unspecified fetus    -  1    Encounter for  screening for fetal growth retardation           Follow-ups after your visit        Future tests that were ordered for you today     Open Standing Orders        Priority Remaining Interval Expires Ordered    BPP (Single) w/out NST (In Clinic) Routine 3/2017          Open Future Orders        Priority Expected Expires Ordered    MFM US Comprehensive Single Routine  10/28/2018 2017            Who to contact     If you have questions or need follow up information about today's clinic visit or your schedule please contact [facility] MATERNAL FETAL MEDICINE [facility] directly at [phone].  Normal or non-critical lab and imaging results will be communicated to you by Gusthart, letter or phone within 4 business days after the clinic has received the results. If you do not hear from us within 7 days, please contact the clinic through Motopia or phone. If you have a critical or abnormal lab result, we will notify you by phone as soon as possible.  Submit refill requests through Motopia or call your pharmacy and they will forward the refill request to us. Please allow 3 business days for your refill to be completed.          Additional Information About Your Visit        Gusthart Information     Motopia gives you secure access to your electronic health record. If you see a primary care provider, you can also send messages to your care team and make appointments. If you have questions, please call your primary care clinic.  If you do not have a primary care provider, please  call [phone] and they will assist you.        Care EveryWhere ID     This is your Care EveryWhere ID. This could be used by other organizations to access your [place] medical records  [id number]        Your Vitals Were     Last Period                   04/16/2017            Blood Pressure from Last 3 Encounters:   12/28/17 105/74   12/21/17 125/80   12/05/17 112/75    Weight from Last 3 Encounters:   12/28/17 80.9 kg (178 lb 6.4 oz)   12/21/17 79.7 kg (175 lb 11.2 oz)   12/05/17 78 kg (171 lb 14.4 oz)              Today, you had the following     No orders found for display       Primary Care Provider Office Phone # Fax #    Ciera Carrie Junior [phone]       [address] [place] [address] 741  [facility] [address]        Equal Access to Services     [facility]: Hadii aad ku hadasho Somayte, waaxda luqadaha, qaybta kaalmada adeegyada, hal cormier hayponcen rafael dawson . So [facility] [phone].    ATENCIÓN: Si habla español, tiene a case disposición servicios gratuitos de asistencia lingüística. Hector al [phone].    We comply with applicable federal civil rights laws and [place] laws. We do not discriminate on the basis of race, color, national origin, age, disability, sex, sexual orientation, or gender identity.            Thank you!     Thank you for choosing [facility]  for your care. Our goal is always to provide you with excellent care. Hearing back from our patients is one way we can continue to improve our services. Please take a few minutes to complete the written survey that you may receive in the mail after your visit with us. Thank you!             Your Updated Medication List - Protect others around you: Learn how to safely use, store and throw away your medicines at www.disposemymeds.org.          This list is accurate as of: 12/28/17  4:09 PM.  Always use your most recent med list.                   Brand Name Dispense  Instructions for use Diagnosis    Cholecalciferol 4000 UNITS Tabs      Take 1 tablet by mouth daily    Supervision of other normal pregnancy, antepartum       order for DME     2 each    Equipment being ordered: Thigh high compression stocking, 20-30 mmHg; L leg    Varicose vein of leg       PRENATAL 19 Chew      Take 1 tablet by mouth daily

## 2017-12-29 LAB
GP B STREP DNA SPEC QL NAA+PROBE: NEGATIVE
SPECIMEN SOURCE: NORMAL

## 2018-01-01 PROBLEM — O26.843 FUNDAL HEIGHT LOW FOR DATES IN THIRD TRIMESTER: Status: ACTIVE | Noted: 2017-12-21

## 2018-01-03 ENCOUNTER — OFFICE VISIT (OUTPATIENT)
Dept: OBGYN | Facility: CLINIC | Age: 33
End: 2018-01-03
Attending: ADVANCED PRACTICE MIDWIFE
Payer: COMMERCIAL

## 2018-01-03 VITALS — DIASTOLIC BLOOD PRESSURE: 68 MMHG | HEART RATE: 80 BPM | SYSTOLIC BLOOD PRESSURE: 105 MMHG | HEIGHT: 67 IN

## 2018-01-03 DIAGNOSIS — Z34.80 SUPERVISION OF OTHER NORMAL PREGNANCY, ANTEPARTUM: ICD-10-CM

## 2018-01-03 DIAGNOSIS — O36.5930 POOR FETAL GROWTH AFFECTING MANAGEMENT OF MOTHER IN THIRD TRIMESTER, SINGLE OR UNSPECIFIED FETUS: ICD-10-CM

## 2018-01-03 DIAGNOSIS — Z34.80 SUPERVISION OF OTHER NORMAL PREGNANCY, ANTEPARTUM: Primary | ICD-10-CM

## 2018-01-03 DIAGNOSIS — O26.843 FUNDAL HEIGHT LOW FOR DATES IN THIRD TRIMESTER: ICD-10-CM

## 2018-01-03 PROCEDURE — 40000809 ZZH STATISTIC NO DOCUMENTATION TO SUPPORT CHARGE

## 2018-01-03 PROCEDURE — G0463 HOSPITAL OUTPT CLINIC VISIT: HCPCS | Mod: ZF

## 2018-01-03 PROCEDURE — 76819 FETAL BIOPHYS PROFIL W/O NST: CPT | Mod: ZF

## 2018-01-03 NOTE — MR AVS SNAPSHOT
After Visit Summary   1/3/2018    Joao العلي    MRN: [id number]           Patient Information     Date Of Birth          1985        Visit Information        Provider Department      1/3/2018 8:30 AM Tara Rand CNM [facility]        Today's Diagnoses     Supervision of other normal pregnancy -  [facility] CNM Pt    -  1    Fundal height low for dates in third trimester, NOT IUGR OR FGR PER M           Follow-ups after your visit        Follow-up notes from your care team     Return in about 1 week (around 1/10/2018) for BPP and NILSA- CNM.      Your next 10 appointments already scheduled     2018 11:00 AM CST   ULTRASOUND with [facility] ULTRASOUND   [facility] ([facility])    [facility] [address] [address]  [facility] [address]   [phone]            2018 11:45 AM CST   RETURN OB with MAURO Godoy CNM   [facility] ([facility])    [facility] [address]  [address]  [facility] [address]   [phone]            2018  2:00 PM CST   ULTRASOUND with [facility] ULTRASOUND   [facility] ([facility])    [facility] [address]  [facility] [address]   [phone]            2018  2:30 PM CST   RETURN OB with MAURO Alexis CNM   [facility] ([facility])    [facility] [address]  [address]  [facility] [address]   [phone]            2018 10:30 AM CST   ULTRASOUND with [facility] ULTRASOUND   [facility] ([facility])    [facility] [address]  [facility] [address]   [phone]            2018 11:00 AM CST   RETURN OB with MAURO Hernandez  "MAXIMINO   [facility] ([facility])    [facility] [address]  [address]  [facility] [address] [phone]              Who to contact     Please call your clinic at [phone] to:    Ask questions about your health    Make or cancel appointments    Discuss your medicines    Learn about your test results    Speak to your doctor   If you have compliments or concerns about an experience at your clinic, or if you wish to file a complaint, please contact [facility] Patient Relations at [phone] or email us at [email or web address]         Additional Information About Your Visit        [employer] Information     esolidar gives you secure access to your electronic health record. If you see a primary care provider, you can also send messages to your care team and make appointments. If you have questions, please call your primary care clinic.  If you do not have a primary care provider, please call [phone] and they will assist you.      esolidar is an electronic gateway that provides easy, online access to your medical records. With esolidar, you can request a clinic appointment, read your test results, renew a prescription or communicate with your care team.     To access your existing account, please contact your [facility] or call [phone] for assistance.        Care EveryWhere ID     This is your Care EveryWhere ID. This could be used by other organizations to access your [place] medical records  [id number]        Your Vitals Were     Pulse Height Last Period             80 1.702 m (5' 7.01\") 04/16/2017          Blood Pressure from Last 3 Encounters:   01/03/18 105/68   12/28/17 105/74   12/21/17 125/80    Weight from Last 3 Encounters:   12/28/17 80.9 kg (178 lb 6.4 oz)   12/21/17 79.7 kg (175 lb 11.2 oz)   12/05/17 78 kg (171 lb 14.4 oz)              Today, you had the " following     No orders found for display       Primary Care Provider Office Phone # Fax #    Ciera Carrie Junior [phone]       [address] [place] [address] [address] [place] [address]        Equal Access to Services     JM LAMB : Hadii ree kitchen jdo Soomaali, waaxda luqadaha, qaybta kaalmada adeegyada, hal leninin hayaadunia hall casasndrasang berman. So [facility] [phone].    ATENCIÓN: Si habla español, tiene a case disposición servicios gratuitos de asistencia lingüística. Llame al [phone].    We comply with applicable federal civil rights laws and [place] laws. We do not discriminate on the basis of race, color, national origin, age, disability, sex, sexual orientation, or gender identity.            Thank you!     Thank you for choosing [facility]  for your care. Our goal is always to provide you with excellent care. Hearing back from our patients is one way we can continue to improve our services. Please take a few minutes to complete the written survey that you may receive in the mail after your visit with us. Thank you!             Your Updated Medication List - Protect others around you: Learn how to safely use, store and throw away your medicines at www.disposemymeds.org.          This list is accurate as of: 1/3/18 11:59 PM.  Always use your most recent med list.                   Brand Name Dispense Instructions for use Diagnosis    Cholecalciferol 4000 UNITS Tabs      Take 1 tablet by mouth daily    Supervision of other normal pregnancy, antepartum       order for DME     2 each    Equipment being ordered: Thigh high compression stocking, 20-30 mmHg; L leg    Varicose vein of leg       PRENATAL 19 Chew      Take 1 tablet by mouth daily

## 2018-01-03 NOTE — LETTER
"1/3/2018       RE: Joao العلي  [address]  [facility] [address]     Dear Colleague,    Thank you for referring your patient, Joao العلي, to the [facility] at [facility]. Please see a copy of my visit note below.    Subjective:     33 year old  at 37w3d presents for routine prenatal visit.           Patient concerns: Feeling well. Reports irregular bh contractions. Has noted increased pressure- baby lower. States she can now more easily take a deep breath. Denies vaginal bleeding or leakage of fluid. Reports + fetal movement.  No HA, visual changes, RUQ or epigastric pain.     Was happy to have repeat MFM ultrasound last week that showed baby does NOT meet criteria for iugr/fgr. Per MFM, reasonable to continue weekly BPPs for small AC (does not meet criteria for fgr as AC >5%).     Objective:  Vitals:    18 0822   BP: 105/68   Pulse: 80   Height: 1.702 m (5' 7.01\")      See OB flowsheet    BPP 1/3/18:   BPP 8/8, CORNELIUS 15.8, UAR 2.20, cephalic    Assessment/Plan     Encounter Diagnosis   Name Primary?     Supervision of other normal pregnancy -  [facility] CNM Pt Yes     - Reviewed why/how to contact provider if headache/visual changes/RUQ or epigastric pain, decreased fetal movement, vaginal bleeding, leakage of fluid or strong/regular contractions.   Patient education/orders or handouts today:  Sign/symptoms of labor, When to call for labor or other concerns and BPP    Reviewed MFM ultrasound confirming no iugr/fgr. Reviewed recommendation for continued weekly BPPs.  Patient agreeable. Plans BPP next week.  Continue scheduled prenatal care, RTC in 1 week for BPP and NILSA and prn if questions or concerns.     MAURO Mattson, CNM        "

## 2018-01-03 NOTE — PROGRESS NOTES
"Subjective:     33 year old  at 37w3d presents for routine prenatal visit.           Patient concerns: Feeling well. Reports irregular bh contractions. Has noted increased pressure- baby lower. States she can now more easily take a deep breath. Denies vaginal bleeding or leakage of fluid. Reports + fetal movement.  No HA, visual changes, RUQ or epigastric pain.     Was happy to have repeat MFM ultrasound last week that showed baby does NOT meet criteria for iugr/fgr. Per MFM, reasonable to continue weekly BPPs for small AC (does not meet criteria for fgr as AC >5%).     Objective:  Vitals:    18 0822   BP: 105/68   Pulse: 80   Height: 1.702 m (5' 7.01\")      See OB flowsheet    BPP 1/3/18:   BPP 8/8, CORNELIUS 15.8, UAR 2.20, cephalic    Assessment/Plan     Encounter Diagnosis   Name Primary?     Supervision of other normal pregnancy -  [facility] CNM Pt Yes     - Reviewed why/how to contact provider if headache/visual changes/RUQ or epigastric pain, decreased fetal movement, vaginal bleeding, leakage of fluid or strong/regular contractions.   Patient education/orders or handouts today:  Sign/symptoms of labor, When to call for labor or other concerns and BPP    Reviewed MFM ultrasound confirming no iugr/fgr. Reviewed recommendation for continued weekly BPPs.  Patient agreeable. Plans BPP next week.  Continue scheduled prenatal care, RTC in 1 week for BPP and NILSA and prn if questions or concerns.     MAURO Mattson, MAXIMINO      "

## 2018-01-03 NOTE — MR AVS SNAPSHOT
After Visit Summary   1/3/2018    Joao العلي    MRN: [id number]           Patient Information     Date Of Birth          1985        Visit Information        Provider Department      1/3/2018 8:00 AM [facility] ULTRASOUND [facility]        Today's Diagnoses     Poor fetal growth affecting management of mother in third trimester, single or unspecified fetus        Supervision of other normal pregnancy -  S CNM Pt           Follow-ups after your visit        Your next 10 appointments already scheduled     2018 11:00 AM CST   ULTRASOUND with [facility] ULTRASOUND   [facility] ([facility])    [place] [facility] [address]  [facility] [address]   [phone]            2018 11:45 AM CST   RETURN OB with MAURO GodoyM   [facility] ([facility])    [place] [facility] [address]  [address]  [facility] [address]   [phone]            2018  2:00 PM CST   ULTRASOUND with [facility] ULTRASOUND   [facility] ([facility])    [place] [facility] [address]  [facility] [address]   [phone]            2018  2:30 PM CST   RETURN OB with MAURO Alexis CNM   [facility] ([facility])    [place] [facility] [address]  [facility] [address]   [phone]            2018 10:30 AM CST   ULTRASOUND with [facility] ULTRASOUND   [facility] ([facility])    [place] [facility] [address]  [facility] [address]   [phone]            2018 11:00 AM CST   RETURN OB with MAURO Hernandez CNM   [facility] ([facility])    [place] [facility] [address] [address] [phone]              Who to contact     Please call your clinic at [phone] to:    Ask questions about your health    Make or cancel appointments    Discuss your medicines    Learn about your test results    Speak to your doctor   If you have compliments or concerns about an experience at your clinic, or if you wish to file a complaint, please contact [facility] Patient Relations at [phone] or email us at [email or web address]         Additional Information About Your Visit        CreoPophart Information     PopJax gives you secure access to your electronic health record. If you see a primary care provider, you can also send messages to your care team and make appointments. If you have questions, please call your primary care clinic.  If you do not have a primary care provider, please call [phone] and they will assist you.      PopJax is an electronic gateway that provides easy, online access to your medical records. With PopJax, you can request a clinic appointment, read your test results, renew a prescription or communicate with your care team.     To access your existing account, please contact your [facility] or call [phone] for assistance.        Care EveryWhere ID     This is your Care EveryWhere ID. This could be used by other organizations to access your [place] medical records  [id number]        Your Vitals Were     Last Period                   04/16/2017            Blood Pressure from Last 3 Encounters:   01/03/18 105/68   12/28/17 105/74   12/21/17 125/80    Weight from Last 3 Encounters:   12/28/17 80.9 kg (178 lb 6.4 oz)   12/21/17 79.7 kg (175 lb 11.2 oz)   12/05/17 78 kg (171 lb 14.4 oz)              We Performed the Following     BPP (Single) w/out NST (In Clinic)        Primary Care Provider Office Phone # Fax #    Ciera Junior [phone]  [phone]       [address] [place] [address] 741  [facility] [address]        Equal Access to Services     MJ LAMB : Hadii aad ku hadniyahdemetra Somayte, wakarolinada luqadaha, qaybta kaalmada rafaelchavakami, hal cormier krisdunia giraldojamey dinavivsang berman. So [facility] [phone].    ATENCIÓN: Si habla español, tiene a case disposición servicios gratuitos de asistencia lingüística. Llame al [phone].    We comply with applicable federal civil rights laws and [place] laws. We do not discriminate on the basis of race, color, national origin, age, disability, sex, sexual orientation, or gender identity.            Thank you!     Thank you for choosing [facility]  for your care. Our goal is always to provide you with excellent care. Hearing back from our patients is one way we can continue to improve our services. Please take a few minutes to complete the written survey that you may receive in the mail after your visit with us. Thank you!             Your Updated Medication List - Protect others around you: Learn how to safely use, store and throw away your medicines at www.disposemymeds.org.          This list is accurate as of: 1/3/18  9:17 AM.  Always use your most recent med list.                   Brand Name Dispense Instructions for use Diagnosis    Cholecalciferol 4000 UNITS Tabs      Take 1 tablet by mouth daily    Supervision of other normal pregnancy, antepartum       order for DME     2 each    Equipment being ordered: Thigh high compression stocking, 20-30 mmHg; L leg    Varicose vein of leg       PRENATAL 19 Chew      Take 1 tablet by mouth daily

## 2018-01-03 NOTE — LETTER
1/3/2018       RE: Joao العلي  [address]  [facility] [address]     Dear Colleague,    Thank you for referring your patient, Joao العلي, to the [facility] at [facility]. Please see a copy of my visit note below.    33 year old,  , presents at 37 3/7 weeks in pregnancy complicated by IUGR for biophysical assessment.    Fetal Breathing Movements (FBM): Normal - 2  Gross Body Movements (GBM): Normal - 2  Fetal Tone (FT): Normal - 2  AFV: Pocket of amniotic fluid > or = to 2 cm x 2 cm - 2  Quantitative Amniotic Fluid Volume Total: 15.8 cm      BPP /8.  UAR = 2.20 Normal.   MCA Not done.  NST Not done.     Single fetus in cephalic presentation.  Placenta anterior and grade 2.    Continue twice weekly BPP with UAR in the setting of fetal growth restriction.     LAVERN Morgan MD

## 2018-01-04 PROBLEM — O99.810 ABNORMAL GLUCOSE AFFECTING PREGNANCY: Status: ACTIVE | Noted: 2017-10-27

## 2018-01-04 PROBLEM — Z34.80 SUPERVISION OF OTHER NORMAL PREGNANCY, ANTEPARTUM: Status: ACTIVE | Noted: 2017-06-13

## 2018-01-11 ENCOUNTER — OFFICE VISIT (OUTPATIENT)
Dept: OBGYN | Facility: CLINIC | Age: 33
End: 2018-01-11
Attending: ADVANCED PRACTICE MIDWIFE
Payer: COMMERCIAL

## 2018-01-11 VITALS
BODY MASS INDEX: 28.19 KG/M2 | HEIGHT: 67 IN | SYSTOLIC BLOOD PRESSURE: 116 MMHG | HEART RATE: 75 BPM | WEIGHT: 179.6 LBS | DIASTOLIC BLOOD PRESSURE: 72 MMHG

## 2018-01-11 DIAGNOSIS — Z34.80 SUPERVISION OF OTHER NORMAL PREGNANCY, ANTEPARTUM: ICD-10-CM

## 2018-01-11 DIAGNOSIS — O36.5930 POOR FETAL GROWTH AFFECTING MANAGEMENT OF MOTHER IN THIRD TRIMESTER, SINGLE OR UNSPECIFIED FETUS: Primary | ICD-10-CM

## 2018-01-11 DIAGNOSIS — Z34.80 SUPERVISION OF OTHER NORMAL PREGNANCY, ANTEPARTUM: Primary | ICD-10-CM

## 2018-01-11 PROCEDURE — 76819 FETAL BIOPHYS PROFIL W/O NST: CPT | Mod: ZF

## 2018-01-11 PROCEDURE — G0463 HOSPITAL OUTPT CLINIC VISIT: HCPCS | Mod: ZF

## 2018-01-11 NOTE — MR AVS SNAPSHOT
After Visit Summary   2018    Joao العلي    MRN: [id number]           Patient Information     Date Of Birth          1985        Visit Information        Provider Department      2018 11:00 AM [facility]        Today's Diagnoses     Poor fetal growth affecting management of mother in third trimester, single or unspecified fetus    -  1    Supervision of other normal pregnancy -  S CNM Pt           Follow-ups after your visit        Your next 10 appointments already scheduled     2018  2:00 PM CST   [facility]   [facility] ([facility])    [place] [facility] [address]  [address]  [facility] [place] [facility] [id number] [phone]            2018  2:30 PM CST   RETURN OB with MAURO Alexis CNM   [facility] ([facility])    [place] [facility] [address]  [address]  [address]  [facility] [id number] [phone]            2018 10:30 AM CST   [facility]   [facility] ([facility])    [place] [facility] [address]  [address]  [facility] [id number] [phone]            2018 11:00 AM CST   RETURN OB with MAURO Hernandez CNM   [facility] ([facility])    [place] [facility] [address]  [address]  [facility] [address] [phone]              Who to contact     Please call your clinic at [phone] to:    Ask questions about your health    Make or cancel appointments    Discuss your medicines    Learn about your test results    Speak to your doctor   If you have compliments or concerns about an experience at your clinic, or if you wish to file a complaint, please contact [facility] Patient Relations at  [phone] or email us at [email or web address]         Additional Information About Your Visit        Edfa3lyhart Information     Precogt gives you secure access to your electronic health record. If you see a primary care provider, you can also send messages to your care team and make appointments. If you have questions, please call your primary care clinic.  If you do not have a primary care provider, please call [phone] and they will assist you.      Yantra is an electronic gateway that provides easy, online access to your medical records. With Yantra, you can request a clinic appointment, read your test results, renew a prescription or communicate with your care team.     To access your existing account, please contact your [facility] or call [phone] for assistance.        Care EveryWhere ID     This is your Care EveryWhere ID. This could be used by other organizations to access your [place] medical records  [id number]        Your Vitals Were     Last Period                   04/16/2017            Blood Pressure from Last 3 Encounters:   01/11/18 116/72   01/03/18 105/68   12/28/17 105/74    Weight from Last 3 Encounters:   01/11/18 81.5 kg (179 lb 9.6 oz)   12/28/17 80.9 kg (178 lb 6.4 oz)   12/21/17 79.7 kg (175 lb 11.2 oz)              We Performed the Following     BPP (Single) w/out NST (In Clinic)        Primary Care Provider Office Phone # Fax #    Ciera Carrie Junior [phone]       [address] [place] [address]        Equal Access to Services     MJ LAMB : Hadii aad ku hadasho Soomaali, waaxda luqadaha, qaybta kaalmada rambo, hal berman. So [facility] [phone].    ATENCIÓN: Si habla español, tiene a case disposición servicios gratuitos de asistencia lingüística. Llame al [phone].    We comply with applicable federal civil rights laws and [place] laws. We do  not discriminate on the basis of race, color, national origin, age, disability, sex, sexual orientation, or gender identity.            Thank you!     Thank you for choosing [facility]  for your care. Our goal is always to provide you with excellent care. Hearing back from our patients is one way we can continue to improve our services. Please take a few minutes to complete the written survey that you may receive in the mail after your visit with us. Thank you!             Your Updated Medication List - Protect others around you: Learn how to safely use, store and throw away your medicines at www.disposemymeds.org.          This list is accurate as of: 1/11/18  1:52 PM.  Always use your most recent med list.                   Brand Name Dispense Instructions for use Diagnosis    Cholecalciferol 4000 UNITS Tabs      Take 1 tablet by mouth daily    Supervision of other normal pregnancy, antepartum       order for DME     2 each    Equipment being ordered: Thigh high compression stocking, 20-30 mmHg; L leg    Varicose vein of leg       PRENATAL 19 Chew      Take 1 tablet by mouth daily
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
## 2018-01-11 NOTE — LETTER
2018       RE: Joao العلي  [address]  [facility] [address]     Dear Colleague,    Thank you for referring your patient, Joao العلي, to the [facility] at [facility]. Please see a copy of my visit note below.    33 year old,  , presents at 38 4/7 weeks in pregnancy complicated by small AC for biophysical assessment.    Fetal Breathing Movements (FBM): Normal - 2  Gross Body Movements (GBM): Normal - 2  Fetal Tone (FT): Normal - 2  AFV: Pocket of amniotic fluid > or = to 2 cm x 2 cm - 2  Quantitative Amniotic Fluid Volume Total: 13.0 cm      BPP 8/8.  FHR = 161bpm    MCA Not done.  NST Not done.     Single fetus in cephalic presentation.  Placenta anterior and grade 2.    Recommend weekly assessment as per most recent MFM recommendations.    LAVERN Morgan MD, FACOG      Again, thank you for allowing me to participate in the care of your patient.      Sincerely,    [facility] Ul[facility]ound

## 2018-01-11 NOTE — PROGRESS NOTES
33 year old,  , presents at 38 4/7 weeks in pregnancy complicated by small AC for biophysical assessment.    Fetal Breathing Movements (FBM): Normal - 2  Gross Body Movements (GBM): Normal - 2  Fetal Tone (FT): Normal - 2  AFV: Pocket of amniotic fluid > or = to 2 cm x 2 cm - 2  Quantitative Amniotic Fluid Volume Total: 13.0 cm      BPP 8/8.  FHR = 161bpm    MCA Not done.  NST Not done.     Single fetus in cephalic presentation.  Placenta anterior and grade 2.    Recommend weekly assessment as per most recent MFM recommendations.    LAVERN Morgan MD, FACOG

## 2018-01-11 NOTE — MR AVS SNAPSHOT
After Visit Summary   2018    Joao العلي    MRN: [id number]           Patient Information     Date Of Birth          1985        Visit Information        Provider Department      2018 11:45 AM Yaw Abbott APRN CN [facility]        Today's Diagnoses     Supervision of other normal pregnancy -  [facility] CNM Pt    -  1       Follow-ups after your visit        Follow-up notes from your care team     Return in about 1 week (around 2018).      Your next 10 appointments already scheduled     2018  2:00 PM CST   ULTRASOUND with [facility] ULTRASOUND   [facility] ([facility])    [place] [facility] [address]  [address]  [address]  [facility] [address] [phone]            2018  2:30 PM CST   RETURN OB with MAURO Alexis CNM   [facility] ([facility])    [place] [facility] [address]  [address]  [address]  [facility] [id number] [phone]            2018 10:30 AM CST   ULTRASOUND with [facility] ULTRASOUND   [facility] ([facility])    [place] [facility] [address]  [address]  [address]  [facility] [id number] [phone]            2018 11:00 AM CST   RETURN OB with MAURO Hernandez CNM   [facility] ([facility])    [place] [facility] [address]  [address]  [address]  [facility] [phone] [phone]              Who to contact     Please call your clinic at [phone] to:    Ask questions about your health    Make or cancel appointments    Discuss your medicines    Learn about your test results    Speak to your doctor   If you have compliments or concerns about an experience at your clinic, or if you wish to file a complaint, please contact [facility] Patient Relations at [phone] or email  "us at [email or web address]         Additional Information About Your Visit        I-frontdeskhart Information     [employer] gives you secure access to your electronic health record. If you see a primary care provider, you can also send messages to your care team and make appointments. If you have questions, please call your primary care clinic.  If you do not have a primary care provider, please call [phone] and they will assist you.      [employer] is an electronic gateway that provides easy, online access to your medical records. With [employer], you can request a clinic appointment, read your test results, renew a prescription or communicate with your care team.     To access your existing account, please contact your [facility] or call [phone] for assistance.        Care EveryWhere ID     This is your Care EveryWhere ID. This could be used by other organizations to access your [place] medical records  [id number]        Your Vitals Were     Pulse Height Last Period BMI (Body Mass Index)          75 1.702 m (5' 7.01\") 04/16/2017 28.12 kg/m2         Blood Pressure from Last 3 Encounters:   01/11/18 116/72   01/03/18 105/68   12/28/17 105/74    Weight from Last 3 Encounters:   01/11/18 81.5 kg (179 lb 9.6 oz)   12/28/17 80.9 kg (178 lb 6.4 oz)   12/21/17 79.7 kg (175 lb 11.2 oz)              Today, you had the following     No orders found for display       Primary Care Provider Office Phone # Fax #    Ciera Carrie Junior [phone]       [address] [place] [address]  [facility] [address]        Equal Access to Services     NEHA LAMB : Hadii ree Dillard, paula matias, hal aguilar. So [facility] [phone].    ATENCIÓN: Si habla español, tiene a case disposición servicios gratuitos de asistencia lingüística. Llame al [phone].    We comply with applicable federal civil rights laws " and [place] laws. We do not discriminate on the basis of race, color, national origin, age, disability, sex, sexual orientation, or gender identity.            Thank you!     Thank you for choosing [facility]  for your care. Our goal is always to provide you with excellent care. Hearing back from our patients is one way we can continue to improve our services. Please take a few minutes to complete the written survey that you may receive in the mail after your visit with us. Thank you!             Your Updated Medication List - Protect others around you: Learn how to safely use, store and throw away your medicines at www.disposemymeds.org.          This list is accurate as of: 1/11/18 12:08 PM.  Always use your most recent med list.                   Brand Name Dispense Instructions for use Diagnosis    Cholecalciferol 4000 UNITS Tabs      Take 1 tablet by mouth daily    Supervision of other normal pregnancy, antepartum       order for DME     2 each    Equipment being ordered: Thigh high compression stocking, 20-30 mmHg; L leg    Varicose vein of leg       PRENATAL 19 Chew      Take 1 tablet by mouth daily
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
## 2018-01-12 ENCOUNTER — HOSPITAL ENCOUNTER (INPATIENT)
Facility: CLINIC | Age: 33
LOS: 1 days | Discharge: HOME HEALTH | End: 2018-01-13
Attending: ADVANCED PRACTICE MIDWIFE | Admitting: ADVANCED PRACTICE MIDWIFE
Payer: COMMERCIAL

## 2018-01-12 PROBLEM — Z36.89 ENCOUNTER FOR TRIAGE IN PREGNANT PATIENT: Status: ACTIVE | Noted: 2018-01-12

## 2018-01-12 LAB
A1 MICROGLOB PLACENTAL VAG QL: POSITIVE
ABO + RH BLD: NORMAL
ABO + RH BLD: NORMAL
ALT SERPL W P-5'-P-CCNC: 17 U/L (ref 0–50)
AST SERPL W P-5'-P-CCNC: 24 U/L (ref 0–45)
BASOPHILS # BLD AUTO: 0 10E9/L (ref 0–0.2)
BASOPHILS NFR BLD AUTO: 0.1 %
CREAT SERPL-MCNC: 0.45 MG/DL (ref 0.52–1.04)
DIFFERENTIAL METHOD BLD: ABNORMAL
EOSINOPHIL # BLD AUTO: 0 10E9/L (ref 0–0.7)
EOSINOPHIL NFR BLD AUTO: 0.2 %
ERYTHROCYTE [DISTWIDTH] IN BLOOD BY AUTOMATED COUNT: 12.7 % (ref 10–15)
GFR SERPL CREATININE-BSD FRML MDRD: >90 ML/MIN/1.7M2
HCT VFR BLD AUTO: 38.7 % (ref 35–47)
HGB BLD-MCNC: 13.3 G/DL (ref 11.7–15.7)
IMM GRANULOCYTES # BLD: 0.1 10E9/L (ref 0–0.4)
IMM GRANULOCYTES NFR BLD: 0.4 %
LYMPHOCYTES # BLD AUTO: 1.5 10E9/L (ref 0.8–5.3)
LYMPHOCYTES NFR BLD AUTO: 7.3 %
MCH RBC QN AUTO: 30 PG (ref 26.5–33)
MCHC RBC AUTO-ENTMCNC: 34.4 G/DL (ref 31.5–36.5)
MCV RBC AUTO: 87 FL (ref 78–100)
MONOCYTES # BLD AUTO: 0.6 10E9/L (ref 0–1.3)
MONOCYTES NFR BLD AUTO: 3.1 %
NEUTROPHILS # BLD AUTO: 17.8 10E9/L (ref 1.6–8.3)
NEUTROPHILS NFR BLD AUTO: 88.9 %
NRBC # BLD AUTO: 0 10*3/UL
NRBC BLD AUTO-RTO: 0 /100
PLATELET # BLD AUTO: 167 10E9/L (ref 150–450)
RBC # BLD AUTO: 4.43 10E12/L (ref 3.8–5.2)
RUBV IGG SERPL IA-ACNC: 12 IU/ML
SPECIMEN EXP DATE BLD: NORMAL
T PALLIDUM IGG+IGM SER QL: NEGATIVE
URATE SERPL-MCNC: 3.2 MG/DL (ref 2.6–6)
WBC # BLD AUTO: 20 10E9/L (ref 4–11)

## 2018-01-12 PROCEDURE — 86900 BLOOD TYPING SEROLOGIC ABO: CPT | Performed by: ADVANCED PRACTICE MIDWIFE

## 2018-01-12 PROCEDURE — 0HQ9XZZ REPAIR PERINEUM SKIN, EXTERNAL APPROACH: ICD-10-PCS | Performed by: ADVANCED PRACTICE MIDWIFE

## 2018-01-12 PROCEDURE — 25000128 H RX IP 250 OP 636: Performed by: ADVANCED PRACTICE MIDWIFE

## 2018-01-12 PROCEDURE — 12000028 ZZH R&B OB UMMC

## 2018-01-12 PROCEDURE — 85025 COMPLETE CBC W/AUTO DIFF WBC: CPT | Performed by: ADVANCED PRACTICE MIDWIFE

## 2018-01-12 PROCEDURE — 84450 TRANSFERASE (AST) (SGOT): CPT | Performed by: ADVANCED PRACTICE MIDWIFE

## 2018-01-12 PROCEDURE — 84112 EVAL AMNIOTIC FLUID PROTEIN: CPT | Performed by: ADVANCED PRACTICE MIDWIFE

## 2018-01-12 PROCEDURE — 25000132 ZZH RX MED GY IP 250 OP 250 PS 637

## 2018-01-12 PROCEDURE — 84550 ASSAY OF BLOOD/URIC ACID: CPT | Performed by: ADVANCED PRACTICE MIDWIFE

## 2018-01-12 PROCEDURE — 86780 TREPONEMA PALLIDUM: CPT | Performed by: ADVANCED PRACTICE MIDWIFE

## 2018-01-12 PROCEDURE — 25000128 H RX IP 250 OP 636

## 2018-01-12 PROCEDURE — 25000132 ZZH RX MED GY IP 250 OP 250 PS 637: Performed by: ADVANCED PRACTICE MIDWIFE

## 2018-01-12 PROCEDURE — 84460 ALANINE AMINO (ALT) (SGPT): CPT | Performed by: ADVANCED PRACTICE MIDWIFE

## 2018-01-12 PROCEDURE — 25000125 ZZHC RX 250

## 2018-01-12 PROCEDURE — 86901 BLOOD TYPING SEROLOGIC RH(D): CPT | Performed by: ADVANCED PRACTICE MIDWIFE

## 2018-01-12 PROCEDURE — 36415 COLL VENOUS BLD VENIPUNCTURE: CPT | Performed by: ADVANCED PRACTICE MIDWIFE

## 2018-01-12 PROCEDURE — G0463 HOSPITAL OUTPT CLINIC VISIT: HCPCS

## 2018-01-12 PROCEDURE — 86762 RUBELLA ANTIBODY: CPT | Performed by: ADVANCED PRACTICE MIDWIFE

## 2018-01-12 PROCEDURE — 82565 ASSAY OF CREATININE: CPT | Performed by: ADVANCED PRACTICE MIDWIFE

## 2018-01-12 PROCEDURE — 72200001 ZZH LABOR CARE VAGINAL DELIVERY SINGLE

## 2018-01-12 RX ORDER — OXYTOCIN 10 [USP'U]/ML
10 INJECTION, SOLUTION INTRAMUSCULAR; INTRAVENOUS
Status: DISCONTINUED | OUTPATIENT
Start: 2018-01-12 | End: 2018-01-13 | Stop reason: HOSPADM

## 2018-01-12 RX ORDER — NALOXONE HYDROCHLORIDE 0.4 MG/ML
.1-.4 INJECTION, SOLUTION INTRAMUSCULAR; INTRAVENOUS; SUBCUTANEOUS
Status: DISCONTINUED | OUTPATIENT
Start: 2018-01-12 | End: 2018-01-12

## 2018-01-12 RX ORDER — MISOPROSTOL 200 UG/1
TABLET ORAL
Status: COMPLETED
Start: 2018-01-12 | End: 2018-01-12

## 2018-01-12 RX ORDER — AMOXICILLIN 250 MG
2 CAPSULE ORAL 2 TIMES DAILY PRN
Status: DISCONTINUED | OUTPATIENT
Start: 2018-01-12 | End: 2018-01-13 | Stop reason: HOSPADM

## 2018-01-12 RX ORDER — ACETAMINOPHEN 325 MG/1
650 TABLET ORAL EVERY 4 HOURS PRN
Status: DISCONTINUED | OUTPATIENT
Start: 2018-01-12 | End: 2018-01-12

## 2018-01-12 RX ORDER — OXYCODONE HYDROCHLORIDE 5 MG/1
5 TABLET ORAL EVERY 4 HOURS PRN
Status: DISCONTINUED | OUTPATIENT
Start: 2018-01-12 | End: 2018-01-13 | Stop reason: HOSPADM

## 2018-01-12 RX ORDER — OXYTOCIN 10 [USP'U]/ML
10 INJECTION, SOLUTION INTRAMUSCULAR; INTRAVENOUS
Status: COMPLETED | OUTPATIENT
Start: 2018-01-12 | End: 2018-01-12

## 2018-01-12 RX ORDER — LIDOCAINE HYDROCHLORIDE 10 MG/ML
INJECTION, SOLUTION EPIDURAL; INFILTRATION; INTRACAUDAL; PERINEURAL
Status: COMPLETED
Start: 2018-01-12 | End: 2018-01-12

## 2018-01-12 RX ORDER — OXYTOCIN/0.9 % SODIUM CHLORIDE 30/500 ML
PLASTIC BAG, INJECTION (ML) INTRAVENOUS
Status: DISCONTINUED
Start: 2018-01-12 | End: 2018-01-12 | Stop reason: WASHOUT

## 2018-01-12 RX ORDER — MISOPROSTOL 200 UG/1
400 TABLET ORAL
Status: COMPLETED | OUTPATIENT
Start: 2018-01-12 | End: 2018-01-12

## 2018-01-12 RX ORDER — HYDROCORTISONE 2.5 %
CREAM (GRAM) TOPICAL 3 TIMES DAILY PRN
Status: DISCONTINUED | OUTPATIENT
Start: 2018-01-12 | End: 2018-01-13 | Stop reason: HOSPADM

## 2018-01-12 RX ORDER — ONDANSETRON 2 MG/ML
4 INJECTION INTRAMUSCULAR; INTRAVENOUS EVERY 6 HOURS PRN
Status: DISCONTINUED | OUTPATIENT
Start: 2018-01-12 | End: 2018-01-12

## 2018-01-12 RX ORDER — OXYTOCIN/0.9 % SODIUM CHLORIDE 30/500 ML
100-340 PLASTIC BAG, INJECTION (ML) INTRAVENOUS CONTINUOUS PRN
Status: DISCONTINUED | OUTPATIENT
Start: 2018-01-12 | End: 2018-01-12

## 2018-01-12 RX ORDER — OXYTOCIN 10 [USP'U]/ML
INJECTION, SOLUTION INTRAMUSCULAR; INTRAVENOUS
Status: COMPLETED
Start: 2018-01-12 | End: 2018-01-12

## 2018-01-12 RX ORDER — OXYTOCIN/0.9 % SODIUM CHLORIDE 30/500 ML
340 PLASTIC BAG, INJECTION (ML) INTRAVENOUS CONTINUOUS PRN
Status: DISCONTINUED | OUTPATIENT
Start: 2018-01-12 | End: 2018-01-13 | Stop reason: HOSPADM

## 2018-01-12 RX ORDER — AMOXICILLIN 250 MG
1 CAPSULE ORAL 2 TIMES DAILY PRN
Status: DISCONTINUED | OUTPATIENT
Start: 2018-01-12 | End: 2018-01-13 | Stop reason: HOSPADM

## 2018-01-12 RX ORDER — SODIUM CHLORIDE, SODIUM LACTATE, POTASSIUM CHLORIDE, CALCIUM CHLORIDE 600; 310; 30; 20 MG/100ML; MG/100ML; MG/100ML; MG/100ML
INJECTION, SOLUTION INTRAVENOUS CONTINUOUS
Status: DISCONTINUED | OUTPATIENT
Start: 2018-01-12 | End: 2018-01-12

## 2018-01-12 RX ORDER — OXYCODONE AND ACETAMINOPHEN 5; 325 MG/1; MG/1
1 TABLET ORAL
Status: DISCONTINUED | OUTPATIENT
Start: 2018-01-12 | End: 2018-01-12

## 2018-01-12 RX ORDER — BISACODYL 10 MG
10 SUPPOSITORY, RECTAL RECTAL DAILY PRN
Status: DISCONTINUED | OUTPATIENT
Start: 2018-01-14 | End: 2018-01-13 | Stop reason: HOSPADM

## 2018-01-12 RX ORDER — IBUPROFEN 800 MG/1
800 TABLET, FILM COATED ORAL
Status: COMPLETED | OUTPATIENT
Start: 2018-01-12 | End: 2018-01-12

## 2018-01-12 RX ORDER — METHYLERGONOVINE MALEATE 0.2 MG/ML
200 INJECTION INTRAVENOUS
Status: DISCONTINUED | OUTPATIENT
Start: 2018-01-12 | End: 2018-01-12

## 2018-01-12 RX ORDER — ACETAMINOPHEN 325 MG/1
650 TABLET ORAL EVERY 4 HOURS PRN
Status: DISCONTINUED | OUTPATIENT
Start: 2018-01-12 | End: 2018-01-13 | Stop reason: HOSPADM

## 2018-01-12 RX ORDER — NALOXONE HYDROCHLORIDE 0.4 MG/ML
.1-.4 INJECTION, SOLUTION INTRAMUSCULAR; INTRAVENOUS; SUBCUTANEOUS
Status: DISCONTINUED | OUTPATIENT
Start: 2018-01-12 | End: 2018-01-13 | Stop reason: HOSPADM

## 2018-01-12 RX ORDER — CEFAZOLIN SODIUM 1 G
1 VIAL (EA) INJECTION ONCE
Status: COMPLETED | OUTPATIENT
Start: 2018-01-12 | End: 2018-01-12

## 2018-01-12 RX ORDER — FENTANYL CITRATE 50 UG/ML
50-100 INJECTION, SOLUTION INTRAMUSCULAR; INTRAVENOUS
Status: DISCONTINUED | OUTPATIENT
Start: 2018-01-12 | End: 2018-01-12

## 2018-01-12 RX ORDER — LANOLIN 100 %
OINTMENT (GRAM) TOPICAL
Status: DISCONTINUED | OUTPATIENT
Start: 2018-01-12 | End: 2018-01-13 | Stop reason: HOSPADM

## 2018-01-12 RX ORDER — IBUPROFEN 800 MG/1
800 TABLET, FILM COATED ORAL EVERY 6 HOURS PRN
Status: DISCONTINUED | OUTPATIENT
Start: 2018-01-12 | End: 2018-01-13 | Stop reason: HOSPADM

## 2018-01-12 RX ORDER — OXYTOCIN/0.9 % SODIUM CHLORIDE 30/500 ML
100 PLASTIC BAG, INJECTION (ML) INTRAVENOUS CONTINUOUS
Status: DISCONTINUED | OUTPATIENT
Start: 2018-01-12 | End: 2018-01-13 | Stop reason: HOSPADM

## 2018-01-12 RX ORDER — CARBOPROST TROMETHAMINE 250 UG/ML
250 INJECTION, SOLUTION INTRAMUSCULAR
Status: DISCONTINUED | OUTPATIENT
Start: 2018-01-12 | End: 2018-01-12

## 2018-01-12 RX ADMIN — IBUPROFEN 800 MG: 800 TABLET, FILM COATED ORAL at 03:42

## 2018-01-12 RX ADMIN — SENNOSIDES AND DOCUSATE SODIUM 1 TABLET: 8.6; 5 TABLET ORAL at 08:51

## 2018-01-12 RX ADMIN — OXYTOCIN 10 UNITS: 10 INJECTION, SOLUTION INTRAMUSCULAR; INTRAVENOUS at 03:33

## 2018-01-12 RX ADMIN — CEFAZOLIN SODIUM 1 G: 1 INJECTION, POWDER, FOR SOLUTION INTRAMUSCULAR; INTRAVENOUS at 05:39

## 2018-01-12 RX ADMIN — ONDANSETRON 4 MG: 2 INJECTION INTRAMUSCULAR; INTRAVENOUS at 02:44

## 2018-01-12 RX ADMIN — ACETAMINOPHEN 650 MG: 325 TABLET, FILM COATED ORAL at 08:51

## 2018-01-12 RX ADMIN — MISOPROSTOL 400 MCG: 200 TABLET ORAL at 03:50

## 2018-01-12 RX ADMIN — IBUPROFEN 800 MG: 800 TABLET, FILM COATED ORAL at 10:38

## 2018-01-12 RX ADMIN — ACETAMINOPHEN 650 MG: 325 TABLET, FILM COATED ORAL at 17:51

## 2018-01-12 RX ADMIN — SENNOSIDES AND DOCUSATE SODIUM 2 TABLET: 8.6; 5 TABLET ORAL at 21:02

## 2018-01-12 RX ADMIN — ACETAMINOPHEN 650 MG: 325 TABLET, FILM COATED ORAL at 13:22

## 2018-01-12 RX ADMIN — IBUPROFEN 800 MG: 800 TABLET, FILM COATED ORAL at 16:20

## 2018-01-12 RX ADMIN — LIDOCAINE HYDROCHLORIDE 20 ML: 10 INJECTION, SOLUTION EPIDURAL; INFILTRATION; INTRACAUDAL; PERINEURAL at 03:39

## 2018-01-12 NOTE — L&D DELIVERY NOTE
DELIVERY NOTE:  Brief Labor Course: Pt presented to [facility] in active labor after SROM around 0015.  Labor progressed spontaneously to C/C/0 at 0300.  Began pushing with strong maternal urge.  Delivery Note:    of live female at 0329 in LIANG position, shoulders delivered without difficulty over intact perineum. Baby placed immediately on mother's abdomen, dried and stimulated for spontaneous cry. Cord clamped and cut by FOB once pulsing stopped.  Cord blood collected. IM pitocin given prior to delivery of placenta. Placenta delivered at 0335 intact with 3VC with Wolfe maneuver with trailing membranes teased out gently.  Fundus firm and midline with massage. Upon inspection, 1st degree vaginal laceration noted with hemostatic labial lacerations. The 1st degree laceration was infiltrated with 1% lidocaine and repaired in the usual fashion with 3-0 vicryl.  Labial lacerations left unrepaired.  Brisk bleeding continued during repair of laceration, buccal misoprostol given. Manual removal of clots d/t continued bleeding, 1g Ancef given after manual sweep.   ml.   IUP at 38 weeks gestation delivered on 2018.     delivery of a viable Female infant.  Weight : pending  Apgars of 8 at 1 minute and 9 at 5 minutes.  Labor was spontaneous.  Medications administered  in labor:  Pain Rx None; Antibiotics No; Other n/a  Perineum: 1st degree vaginal, labial lacs  Placenta-mechanism: spontaneous, intact,  with a 3 vessel cord. IM oxytocin was given after delivery of baby. Buccal misoprostol was given.  Quantitative Blood Loss was 399cc.   Complications of labor and delivery: None  Anticipated Discharge Date: 2018  Birth attendants: MAXIMINO Grady APRN CNM     Delivery Summary    Joao العلي MRN# [id number]   Age: 33 year old YOB: 1985     Labor Event Times:    Labor Onset Date       Labor Onset Time    Dilation Complete Date    Dilation Complete Time        Start Pushing Date        Start Pushing Time            Labor Length:    1st Stage (hrs/min)     2nd Stage (hrs/min)     3rd Stage (hrs/min)         Labor Events:     Labor No   Rupture Date     Rupture Time     Rupture Type Spontaneous rupture of membranes occuring during spontaneous labor or augmentation   Fluid Color     Labor Type     Induction    Induction Indication         Augmentation    Labor Complications     Additional Complications     Management of Labor        Antibiotics     IV Antibiotic Given     Additional Management     Fetal Status Prior to  Delivery     Fetal Status Comments         Cervical Ripening:    Date     Time     Type         Delivery:    Episiotomy None   Local Anesthetic        Lacerations None   Sponge Count Correct       Needle Count Correct     Final Count by:    Sutures     Blood loss (ml)    Packing Intentionally Left In     Number     Comments           Information for the patient's :  Sixto العلي [[id number]]       Delivery  2018 3:29 AM by  Vaginal, Spontaneous Delivery  Sex:  female Gestational Age: 38w5d  Delivery Clinician:     Living?:            APGARS  One minute Five minutes Ten minutes   Skin color:            Heart rate:            Grimace:            Muscle tone:            Breathing:            Totals: 8  9         Presentation/position:           Resuscitation and Interventions: Method:  None  Oxygen Type:     Intubation Time:   # of Attempts:     ETT Size:        Tracheal Suction:     Tracheal returns:      [place] Care at Delivery:   female to mom's abdomen. warmth and stimulation provided, VSS, afebrile.         Cord information:     Disposition of cord blood:      Blood gases sent?    Complications:     Placenta: Delivered:           appearance.  Comments: 3VC, Aiden cade.  Disposition: Hospital disposal   Measurements:  Weight:    Height:    Head circumference:    Chest circumference:     Temperature:     Other  providers:       Additional  information:  Forceps:    Verbal Informed Consent Obtained:       Alternative Labor Strategies Discussed:     Emergency Resources Available:       Type:       Accrued Pulling Time:       # of Pulls:      Position:     Fetal Station:       Indications:      Other Indications:     Operative Vaginal Delivery Brief Note Forceps:        Vacuum:    Verbal Informed Consent Obtained:     Alternative Labor Strategies Discussed:     Emergency Resources Available:     Type:      Accrued Pulling Time:       # of Pop-Offs:       # of Pulls:       Position:     Fetal Station:      Indications for Vacuum:       Other Indications:    Operative Vaginal Delivery Brief Note Vacuum:        Shoulder Dystocia Shoulder Dystocia    Fetal Tracing Prior to Delivery:  Category 2   Shoulder dystocia present?:  No                                            Breech:       : Type:     Indications for Primary:     Indications for Secondary:     Other Indications:        Observed anomalies     Output in Delivery Room: Stool

## 2018-01-12 NOTE — IP AVS SNAPSHOT
MRN:[id number]                      After Visit Summary   1/12/2018    Joao العلي    MRN: [id number]           Thank you!     Thank you for choosing [facility] for your care. Our goal is always to provide you with excellent care. Hearing back from our patients is one way we can continue to improve our services. Please take a few minutes to complete the written survey that you may receive in the mail after you visit with us. Thank you!        Patient Information     Date Of Birth          1985        Designated Caregiver       Most Recent Value    Caregiver    Will someone help with your care after discharge? no      About your hospital stay     You were admitted on:  January 12, 2018 You last received care in the:  [facility]    You were discharged on:  January 13, 2018       Who to Call     For medical emergencies, please call 911.  For non-urgent questions about your medical care, please call your primary care provider or clinic, [phone]          Attending Provider     Provider Specialty    Yaw Abbott APRN CNM [facility]       Primary Care Provider Office Phone # Fax #    Ciera Carrie Junior [phone]      Your next 10 appointments already scheduled     Jan 16, 2018  2:00 PM CST   ULTRASOUND with [facility] ULTRASOUND   [facility] ([facility])    [facility] [address]  [facility] [place] [id number] [phone]            Jan 16, 2018  2:30 PM CST   RETURN OB with MAURO Alexis CNM   [facility] ([facility])    [facility] [address]  [address]  [facility] [id number] [phone]            Jan 22, 2018 10:30 AM CST   ULTRASOUND with [facility] ULTRASOUND   [facility] ([facility])    [facility] [address]  [facility] [place] [id number] [phone]             Jan 22, 2018 11:00 AM CST   RETURN OB with MAURO Hernandez CNM   [facility] ([facility])    [facility] [address]  [address]  [address]  [facility] [id number] [phone]              Further instructions from your care team       Postpartum Vaginal Delivery Instructions    Activity       Ask family and friends for help when you need it.    Do not place anything in your vagina for 6 weeks.    You are not restricted on other activities, but take it easy for a few weeks to allow your body to recover from delivery.  You are able to do any activities you feel up to that point.    No driving until you have stopped taking your pain medications (usually two weeks after delivery).     Call your health care provider if you have any of these symptoms:       Increased pain, swelling, redness, or fluid around your stiches from an episiotomy or perineal tear.    A fever above 100.4 F (38 C) with or without chills when placing a thermometer under your tongue.    You soak a sanitary pad with blood within 1 hour, or you see blood clots larger than a golf ball.    Bleeding that lasts more than 6 weeks.    Vaginal discharge that smells bad.    Severe pain, cramping or tenderness in your lower belly area.    A need to urinate more frequently (use the toilet more often), more urgently (use the toilet very quickly), or it burns when you urinate.    Nausea and vomiting.    Redness, swelling or pain around a vein in your leg.    Problems breastfeeding or a red or painful area on your breast.    Chest pain and cough or are gasping for air.    Problems coping with sadness, anxiety, or depression.  If you have any concerns about hurting yourself or the baby, call your provider immediately.     You have questions or concerns after you return home.     Keep your hands clean:  Always wash your hands before touching your perineal area and stitches.  This helps reduce  your risk of infection.  If your hands aren't dirty, you may use an alcohol hand-rub to clean your hands. Keep your nails clean and short.        Pending Results     No orders found for last 3 day(s).            Statement of Approval     Ordered          01/13/18 1009  I have reviewed and agree with all the recommendations and orders detailed in this document.  EFFECTIVE NOW     Approved and electronically signed by:  Leora Mello APRN CNM             Admission Information     Date & Time Provider Department Dept. Phone    1/12/2018 Yaw Abbott APRN CNM [facility] [phone]      Your Vitals Were     Blood Pressure Temperature Respirations Last Period          112/51 97.6  F (36.4  C) (Oral) 16 04/16/2017        MyChart Information     [facility] gives you secure access to your electronic health record. If you see a primary care provider, you can also send messages to your care team and make appointments. If you have questions, please call your primary care clinic.  If you do not have a primary care provider, please call [phone] and they will assist you.        Care EveryWhere ID     This is your Care EveryWhere ID. This could be used by other organizations to access your [facility] medical records  [id number]        Equal Access to Services     MJ LAMB AH: Tomas Dillard, wakarolinada florencio, qaybta kaalmada rambo, hal berman. So [facility] [phone].    ATENCIÓN: Si habla español, tiene a case disposición servicios gratuitos de asistencia lingüística. Llame al [phone].    We comply with applicable federal civil rights laws and [place] laws. We do not discriminate on the basis of race, color, national origin, age, disability, sex, sexual orientation, or gender identity.               Review of your medicines      START taking        Dose / Directions    sennosides 8.6 MG tablet   Commonly known as:  SENOKOT        Dose:  1 tablet   Take 1 tablet by  mouth 2 times daily as needed for constipation   Quantity:  60 tablet   Refills:  1         CONTINUE these medicines which have NOT CHANGED        Dose / Directions    Cholecalciferol 4000 UNITS Tabs   Used for:  Supervision of other normal pregnancy, antepartum        Dose:  1 tablet   Take 1 tablet by mouth daily   Refills:  0       order for DME   Used for:  Varicose vein of leg        Equipment being ordered: Thigh high compression stocking, 20-30 mmHg; L leg   Quantity:  2 each   Refills:  0       PRENATAL 19 Chew        Dose:  1 tablet   Take 1 tablet by mouth daily   Refills:  9            Where to get your medicines      These medications were sent to [facility] [address] [place] [address] - [address] AT [place] [address] & [address]  [address], [facility] [address]     Phone:  [phone]     sennosides 8.6 MG tablet                Protect others around you: Learn how to safely use, store and throw away your medicines at www.disposemymeds.org.             Medication List: This is a list of all your medications and when to take them. Check marks below indicate your daily home schedule. Keep this list as a reference.      Medications           Morning Afternoon Evening Bedtime As Needed    Cholecalciferol 4000 UNITS Tabs   Take 1 tablet by mouth daily                                order for DME   Equipment being ordered: Thigh high compression stocking, 20-30 mmHg; L leg                                PRENATAL 19 Chew   Take 1 tablet by mouth daily                                sennosides 8.6 MG tablet   Commonly known as:  SENOKOT   Take 1 tablet by mouth 2 times daily as needed for constipation

## 2018-01-12 NOTE — PLAN OF CARE
Problem: Patient Care Overview  Goal: Plan of Care/Patient Progress Review  Outcome: Improving  Data: Vital signs within normal limits. Postpartum checks within normal limits - see flow record. Patient eating and drinking normally. Patient able to empty bladder independently and is up ambulating. No apparent signs of infection. Episiotomy healing well. Patient performing self cares and is able to care for infant.  Action: Patient medicated during the shift for cramping. See MAR. Patient reassessed within 1 hour after each medication and pain was improved - patient stated she was comfortable. Patient education done about [facility] offerings. See flow record.  Response: Positive attachment behaviors observed with infant.  Father of baby present, although currently asleep.  Plan: Anticipate discharge tomorrow.

## 2018-01-12 NOTE — PROVIDER NOTIFICATION
01/12/18 0717   Provider Notification   Provider Name/Title MARGRET Abbott CNM   Method of Notification Electronic Page   Notification Reason Status Update   FYI - Total  (399 following delivery, IM pit & buccal miso given). Pt  able to void, then up to PP. Temp 99.3 prior to transfer, IM abx given per MARGRET Abbott following manual sweep for clots

## 2018-01-12 NOTE — PLAN OF CARE
Problem: Patient Care Overview  Goal: Plan of Care/Patient Progress Review  Outcome: Therapy, progress toward functional goals as expected  Data: Vital signs within normal limits. Postpartum checks within normal limits - see flow record. Patient did void after delivery, was lightheaded when she stood--plan for pt to call RN prior to getting up to use the bathroom this AM, pt understands to call RN. No apparent signs of infection.  Patient is breastfeeding infant with assist. Discussed hand expression but was not able to demonstrate on patient as infant was feeding.   Action: Patient and  oriented to room and unit, discussed  folder and booklet, birth certificate, [place], pain management, safe sleep, bulb syringe use, breastfeeding holds and sandwiching breast when latching.   Response: Positive attachment behaviors observed with infant. Support persons present.   Plan: continue plan of care.

## 2018-01-12 NOTE — PLAN OF CARE
Patient arrived to [facility] unit via wheelchair at 0605,with belongings, accompanied by spouse/ significant other, with infant in arms. Received report from Carolina CLARK and checked bands. Unit and room orientation completd. Call light given and within arms reach; no concerns present at this time. Continue with plan of care.

## 2018-01-12 NOTE — IP AVS SNAPSHOT
[facility]    [address] [facility] [address]    Phone:  [phone]                                       After Visit Summary   1/12/2018    Joao العلي    MRN: [id number]           After Visit Summary Signature Page     I have received my discharge instructions, and my questions have been answered. I have discussed any challenges I see with this plan with the nurse or doctor.    ..........................................................................................................................................  Patient/Patient Representative Signature      ..........................................................................................................................................  Patient Representative Print Name and Relationship to Patient    ..................................................               ................................................  Date                                            Time    ..........................................................................................................................................  Reviewed by Signature/Title    ...................................................              ..............................................  Date                                                            Time

## 2018-01-12 NOTE — PROGRESS NOTES
Vaginal Delivery Note   of viable Female with Yaw Abbott CNM in attendance.  Nursery RN Lilian MADERA present.  Infant with spontaneous cry, to mother's abdomen, dried and stimulated.  APGAR at 1 minute:  8 and APGAR at 5 minutes:  9.  Placenta delivered with out complication, IM pitocin, bilateral labial & vaginal lacerations, with repair, roger cares provided.  Mother and baby in stable condition. , buccal misoprostol given.

## 2018-01-12 NOTE — PROGRESS NOTES
Mother and baby transferred to postpartum unit at 0603  via wheelchair with  in arms after completion of immediate recovery period. Patient oriented to room and report given to Isabelle CLARK who assumes patient care. Mother and baby bonding well and in satisfactory condition upon transfer.
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
## 2018-01-13 VITALS — TEMPERATURE: 97.6 F | DIASTOLIC BLOOD PRESSURE: 51 MMHG | RESPIRATION RATE: 16 BRPM | SYSTOLIC BLOOD PRESSURE: 112 MMHG

## 2018-01-13 LAB — HGB BLD-MCNC: 11.2 G/DL (ref 11.7–15.7)

## 2018-01-13 PROCEDURE — 25000128 H RX IP 250 OP 636: Performed by: ADVANCED PRACTICE MIDWIFE

## 2018-01-13 PROCEDURE — 85018 HEMOGLOBIN: CPT | Performed by: ADVANCED PRACTICE MIDWIFE

## 2018-01-13 PROCEDURE — 36415 COLL VENOUS BLD VENIPUNCTURE: CPT | Performed by: ADVANCED PRACTICE MIDWIFE

## 2018-01-13 PROCEDURE — 25000132 ZZH RX MED GY IP 250 OP 250 PS 637: Performed by: ADVANCED PRACTICE MIDWIFE

## 2018-01-13 PROCEDURE — 90715 TDAP VACCINE 7 YRS/> IM: CPT | Performed by: ADVANCED PRACTICE MIDWIFE

## 2018-01-13 RX ORDER — SENNOSIDES 8.6 MG
1 TABLET ORAL 2 TIMES DAILY PRN
Qty: 60 TABLET | Refills: 1 | Status: SHIPPED | OUTPATIENT
Start: 2018-01-13 | End: 2018-02-28

## 2018-01-13 RX ORDER — IBUPROFEN 600 MG/1
600 TABLET, FILM COATED ORAL EVERY 6 HOURS PRN
Qty: 90 TABLET | Refills: 1 | Status: SHIPPED | OUTPATIENT
Start: 2018-01-13 | End: 2018-02-28

## 2018-01-13 RX ADMIN — CLOSTRIDIUM TETANI TOXOID ANTIGEN (FORMALDEHYDE INACTIVATED), CORYNEBACTERIUM DIPHTHERIAE TOXOID ANTIGEN (FORMALDEHYDE INACTIVATED), BORDETELLA PERTUSSIS TOXOID ANTIGEN (GLUTARALDEHYDE INACTIVATED), BORDETELLA PERTUSSIS FILAMENTOUS HEMAGGLUTININ ANTIGEN (FORMALDEHYDE INACTIVATED), BORDETELLA PERTUSSIS PERTACTIN ANTIGEN, AND BORDETELLA PERTUSSIS FIMBRIAE 2/3 ANTIGEN 0.5 ML: 5; 2; 2.5; 5; 3; 5 INJECTION, SUSPENSION INTRAMUSCULAR at 12:20

## 2018-01-13 RX ADMIN — IBUPROFEN 800 MG: 800 TABLET, FILM COATED ORAL at 00:25

## 2018-01-13 RX ADMIN — SENNOSIDES AND DOCUSATE SODIUM 2 TABLET: 8.6; 5 TABLET ORAL at 07:44

## 2018-01-13 RX ADMIN — IBUPROFEN 800 MG: 800 TABLET, FILM COATED ORAL at 07:44

## 2018-01-13 RX ADMIN — ACETAMINOPHEN 650 MG: 325 TABLET, FILM COATED ORAL at 05:00

## 2018-01-13 RX ADMIN — IBUPROFEN 800 MG: 800 TABLET, FILM COATED ORAL at 14:08

## 2018-01-13 NOTE — PLAN OF CARE
Problem: Patient Care Overview  Goal: Plan of Care/Patient Progress Review  Outcome: Improving  Post partum assessment WNLs.Pain managed with Ibuprofen and Tylenol. Breastfeeding on demand, minimal assist  with deeper latch and positioning. FOB at bedside, supportive. Parents and infant bonding as expected. Stable post partum mom, resting between feedings. Continue with plan of care.

## 2018-01-13 NOTE — PLAN OF CARE
Problem: Patient Care Overview  Goal: Plan of Care/Patient Progress Review  Outcome: Adequate for Discharge Date Met: 01/13/18  VSS, postpartum assessments WNL. She is adequate for discharge- voiding WNL, up ad abril, independent with self and infant cares, and is breastfeeding independently. Reports good pain management. She attended the discharge class this morning. No questions or concerns re discharge instructions. She will discharge home with partner and infant.

## 2018-01-13 NOTE — PLAN OF CARE
Problem: Patient Care Overview  Goal: Plan of Care/Patient Progress Review  VSS and postpartum assessments WDL.  Up ad abril with steady gait.  Independent with cares, showered this evening.  Bonding well with infant.  Breastfeeding on cue independently, encouraged pt to call with feedings to check latch.  Pain managed with tylenol and ibuprofen per MAR and tucks and hot packs.  , Yuniel present and supportive.  Resting between cares and visitors.  Will continue with postpartum cares and education per plan of care.

## 2018-01-18 ENCOUNTER — TELEPHONE (OUTPATIENT)
Dept: OBGYN | Facility: CLINIC | Age: 33
End: 2018-01-18
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
## 2018-02-28 ENCOUNTER — OFFICE VISIT (OUTPATIENT)
Dept: OBGYN | Facility: CLINIC | Age: 33
End: 2018-02-28
Attending: ADVANCED PRACTICE MIDWIFE
Payer: COMMERCIAL

## 2018-02-28 VITALS
BODY MASS INDEX: 25.49 KG/M2 | HEIGHT: 67 IN | WEIGHT: 162.4 LBS | DIASTOLIC BLOOD PRESSURE: 70 MMHG | SYSTOLIC BLOOD PRESSURE: 118 MMHG

## 2018-02-28 DIAGNOSIS — Z12.4 SCREENING FOR CERVICAL CANCER: ICD-10-CM
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
## 2018-02-28 PROBLEM — Z36.89 ENCOUNTER FOR TRIAGE IN PREGNANT PATIENT: Status: RESOLVED | Noted: 2018-01-12 | Resolved: 2018-02-28

## 2018-02-28 PROBLEM — O99.810 ABNORMAL GLUCOSE AFFECTING PREGNANCY: Status: RESOLVED | Noted: 2017-10-27 | Resolved: 2018-02-28

## 2018-02-28 PROCEDURE — 87624 HPV HI-RISK TYP POOLED RSLT: CPT | Performed by: ADVANCED PRACTICE MIDWIFE

## 2018-02-28 PROCEDURE — G0145 SCR C/V CYTO,THINLAYER,RESCR: HCPCS | Performed by: ADVANCED PRACTICE MIDWIFE

## 2018-02-28 PROCEDURE — G0476 HPV COMBO ASSAY CA SCREEN: HCPCS | Performed by: ADVANCED PRACTICE MIDWIFE

## 2018-02-28 PROCEDURE — G0463 HOSPITAL OUTPT CLINIC VISIT: HCPCS | Mod: ZF

## 2018-02-28 ASSESSMENT — PAIN SCALES - GENERAL: PAINLEVEL: NO PAIN (0)

## 2018-02-28 ASSESSMENT — ANXIETY QUESTIONNAIRES
1. FEELING NERVOUS, ANXIOUS, OR ON EDGE: NOT AT ALL
GAD7 TOTAL SCORE: 0
IF YOU CHECKED OFF ANY PROBLEMS ON THIS QUESTIONNAIRE, HOW DIFFICULT HAVE THESE PROBLEMS MADE IT FOR YOU TO DO YOUR WORK, TAKE CARE OF THINGS AT HOME, OR GET ALONG WITH OTHER PEOPLE: NOT DIFFICULT AT ALL
7. FEELING AFRAID AS IF SOMETHING AWFUL MIGHT HAPPEN: NOT AT ALL
3. WORRYING TOO MUCH ABOUT DIFFERENT THINGS: NOT AT ALL
2. NOT BEING ABLE TO STOP OR CONTROL WORRYING: NOT AT ALL
6. BECOMING EASILY ANNOYED OR IRRITABLE: NOT AT ALL
5. BEING SO RESTLESS THAT IT IS HARD TO SIT STILL: NOT AT ALL

## 2018-02-28 ASSESSMENT — PATIENT HEALTH QUESTIONNAIRE - PHQ9: 5. POOR APPETITE OR OVEREATING: NOT AT ALL

## 2018-02-28 NOTE — LETTER
2018       RE: Joao العلي  [address]  [facility] [address]     Dear Colleague,    Thank you for referring your patient, Joao العلي, to the [facility] at [facility]. Please see a copy of my visit note below.    Nursing Notes:   Eliza Cary LPN  2018  2:05 PM  Signed  Chief Complaint   Patient presents with     Post Partum Exam   SUBJECTIVE:   Joao العلي is here for her 6-week postpartum checkup.     PHQ-9 score:   Hx of Abuse:  No    Delivery Date: 2018.    Delivering provider:  Yaw Abbott CNM.    Type of delivery:  .  Perineum:  tear, with repair.     Delivery complications: None  Infant gender:  Girl lucho, weight 5 pounds 10 oz.  Feeding Method:  .  Complications reported with feeding:  none, infant thriving .    Bleeding:  None.  Duration:  Light bleeding from stitiching.  Menses resumed:  No  Bowel/Urinary problems:  No    Contraception Planned:  Wants to talk about  She  has not had intercourse since delivery..     ================================================================  Pt reports feeling overall well during postpartum period.  Happy with birth experience. Had some initial challenges with breastfeeding in the beginning with developing routine and baby colicky.  Issues have since improved and has been exclusively breastfeeding. Reports postpartum bleeding stopped after 1 month. Within last couple days noticed light, bright red spotting. Denies clots. Denies perineal pain.  Reports no urinary issues, incontinence, constipation or hemorrhoids. Has not had intercourse since birth.  Undecided about contraception. Would like at least one more child in ~2 years. Wants to breastfeed as long as possible.  Last pap 2015. Denies history of abnormals. Denies mood changes or depressive symptoms.  ================================================================  ROS: 10 point ROS neg  "other than the symptoms noted above in the HPI.   CONSTITUTIONAL: negative  RESPIRATORY: negative  CARDIOVASCULAR: negative  GI: negative, hemorrhoids and constipation  : negative dysuria and incontinence  PSYCHIATRIC: negative    EXAM:  /70  Ht 1.702 m (5' 7.01\")  Wt 73.7 kg (162 lb 6.4 oz)  LMP 2017  Breastfeeding? Yes  BMI 25.43 kg/m2    General: healthy, alert and no distress  Psych: NEGATIVE  Last PHQ-9 score on record= No Value exists for the : HP#PHQ9  Breasts:  Lactating  Abdomen: Benign, Soft, flat, non-tender, No masses, organomegaly and Diastasis less than 1-2 FB  Incision:  None   Vulva:  Normal genitalia and Bartholin's, Urethra, [place]'s normal  Vagina:  normal with good muscle tone and blood in vault  Cervix:  Multiparous, and pink, moist, closed, without lesion or CMT.    Uterus:  fully involuted and non-tender    Adnexa:  Within normal limits and No masses, nodularity, tenderness  Recto-vaginal:   anus normal    ASSESSMENT:   Encounter Diagnoses   Name Primary?     Screening for cervical cancer Yes     Routine postpartum follow-up       Normal postpartum exam after   Pregnancy was complicated by:  none.      PLAN:  Orders Placed This Encounter   Procedures     Obtaining, preparing and conveyance of cervical or vaginal smear to laboratory.     Pap imaged thin layer screen with HPV - recommended age 30 - 65 years (select HPV order below)     HPV High Risk Types DNA Cervical      - Contraception methods discussed.  Reviewed hormonal vs. non-hormonal IUD side effects, benefits, effectiveness, fertility, and duration of use.  - Discussed calcium intake, vitamins and supplements including Vitamin D  - Exercise encouraged  - Pap with HPV cotesting today  - Discussed possible return of menses vs. prolonged postpartum bleeding/subinvolution. Pt advised to return in 2 weeks if bleeding persists.  - Follow up in 1 year for annual    Again, thank you for allowing me to participate in " the care of your patient.      Sincerely,    MAURO Junior CNM

## 2018-02-28 NOTE — MR AVS SNAPSHOT
"              After Visit Summary   2/28/2018    Joao العلي    MRN: [id number]           Patient Information     Date Of Birth          1985        Visit Information        Provider Department      2/28/2018 2:15 PM Yaw Abbott APRN CNM [facility]        Today's Diagnoses     Routine postpartum follow-up    -  1    Screening for cervical cancer           Follow-ups after your visit        Follow-up notes from your care team     Return in about 1 year (around 2/28/2019) for Annual exam.      Who to contact     Please call your clinic at [phone] to:    Ask questions about your health    Make or cancel appointments    Discuss your medicines    Learn about your test results    Speak to your doctor            Additional Information About Your Visit        [employer] Information     [employer] gives you secure access to your electronic health record. If you see a primary care provider, you can also send messages to your care team and make appointments. If you have questions, please call your primary care clinic.  If you do not have a primary care provider, please call [phone] and they will assist you.      [employer] is an electronic gateway that provides easy, online access to your medical records. With [employer], you can request a clinic appointment, read your test results, renew a prescription or communicate with your care team.     To access your existing account, please contact your [facility] or call [phone] for assistance.        Care EveryWhere ID     This is your Care EveryWhere ID. This could be used by other organizations to access your [place] medical records  [id number]        Your Vitals Were     Height Last Period Breastfeeding? BMI (Body Mass Index)          1.702 m (5' 7.01\") 04/16/2017 Yes 25.43 kg/m2         Blood Pressure from Last 3 Encounters:   02/28/18 118/70   01/13/18 112/51   01/11/18 116/72    Weight from Last 3 " Encounters:   02/28/18 73.7 kg (162 lb 6.4 oz)   01/11/18 81.5 kg (179 lb 9.6 oz)   12/28/17 80.9 kg (178 lb 6.4 oz)              We Performed the Following     HPV High Risk Types DNA Cervical     Obtaining, preparing and conveyance of cervical or vaginal smear to laboratory.     Pap imaged thin layer screen with HPV - recommended age 30 - 65 years (select HPV order below)          Today's Medication Changes          These changes are accurate as of 2/28/18 11:59 PM.  If you have any questions, ask your nurse or doctor.               Stop taking these medicines if you haven't already. Please contact your care team if you have questions.     ibuprofen 600 MG tablet   Commonly known as:  ADVIL/MOTRIN           order for DME           sennosides 8.6 MG tablet   Commonly known as:  SENOKOT                    Primary Care Provider Office Phone # Fax #    Ciera Carrie Junior [phone]       [address] [place] [address] [address] [place] [address]        Equal Access to Services     NEHA LAMB AH: Hadii ree kitchen hadasho Soomaali, waaxda luqadaha, qaybta kaalmada adeegyada, waxay leninin haymartha dawson . So [facility] [phone].    ATENCIÓN: Si habla español, tiene a case disposición servicios gratuitos de asistencia lingüística. [facility] [phone].    We comply with applicable federal civil rights laws and [place] laws. We do not discriminate on the basis of race, color, national origin, age, disability, sex, sexual orientation, or gender identity.            Thank you!     Thank you for choosing [facility]  for your care. Our goal is always to provide you with excellent care. Hearing back from our patients is one way we can continue to improve our services. Please take a few minutes to complete the written survey that you may receive in the mail after your visit with us. Thank you!             Your Updated Medication List - Protect others around you: Learn how to safely  use, store and throw away your medicines at www.disposemymeds.org.          This list is accurate as of 2/28/18 11:59 PM.  Always use your most recent med list.                   Brand Name Dispense Instructions for use Diagnosis    Cholecalciferol 4000 UNITS Tabs      Take 1 tablet by mouth daily    Supervision of other normal pregnancy, antepartum       PRENATAL 19 Chew      Take 1 tablet by mouth daily

## 2018-02-28 NOTE — NURSING NOTE
Chief Complaint   Patient presents with     Post Partum Exam   SUBJECTIVE:   Joao العلي is here for her 6-week postpartum checkup.     PHQ-9 score:   Hx of Abuse:  No    Delivery Date: 2018.    Delivering provider:  Yaw Abbott CNM.    Type of delivery:  .  Perineum:  tear, with repair.     Delivery complications: None  Infant gender:  Girl lucho, weight 5 pounds 10 oz.  Feeding Method:  .  Complications reported with feeding:  none, infant thriving .    Bleeding:  None.  Duration:  Light bleeding from stitiching.  Menses resumed:  No  Bowel/Urinary problems:  No    Contraception Planned:  Wants to talk about  She  has not had intercourse since delivery..

## 2018-02-28 NOTE — PROGRESS NOTES
"Nursing Notes:   Luis DanielEliza Mauricio, LPN  2018  2:05 PM  Signed  Chief Complaint   Patient presents with     Post Partum Exam   SUBJECTIVE:   Joao العلي is here for her 6-week postpartum checkup.     PHQ-9 score:   Hx of Abuse:  No    Delivery Date: 2018.    Delivering provider:  Yaw Abbott CNM.    Type of delivery:  .  Perineum:  tear, with repair.     Delivery complications: None  Infant gender:  Girl lucho, weight 5 pounds 10 oz.  Feeding Method:  .  Complications reported with feeding:  none, infant thriving .    Bleeding:  None.  Duration:  Light bleeding from stitiching.  Menses resumed:  No  Bowel/Urinary problems:  No    Contraception Planned:  Wants to talk about  She  has not had intercourse since delivery..     ================================================================  Pt reports feeling overall well during postpartum period.  Happy with birth experience. Had some initial challenges with breastfeeding in the beginning with developing routine and baby colicky.  Issues have since improved and has been exclusively breastfeeding. Reports postpartum bleeding stopped after 1 month. Within last couple days noticed light, bright red spotting. Denies clots. Denies perineal pain.  Reports no urinary issues, incontinence, constipation or hemorrhoids. Has not had intercourse since birth.  Undecided about contraception. Would like at least one more child in ~2 years. Wants to breastfeed as long as possible.  Last pap 2015. Denies history of abnormals. Denies mood changes or depressive symptoms.  ================================================================  ROS: 10 point ROS neg other than the symptoms noted above in the HPI.   CONSTITUTIONAL: negative  RESPIRATORY: negative  CARDIOVASCULAR: negative  GI: negative, hemorrhoids and constipation  : negative dysuria and incontinence  PSYCHIATRIC: negative    EXAM:  /70  Ht 1.702 m (5' 7.01\")  Wt 73.7 kg (162 lb 6.4 oz)  " LMP 2017  Breastfeeding? Yes  BMI 25.43 kg/m2    General: healthy, alert and no distress  Psych: NEGATIVE  Last PHQ-9 score on record= No Value exists for the : HP#PHQ9  Breasts:  Lactating  Abdomen: Benign, Soft, flat, non-tender, No masses, organomegaly and Diastasis less than 1-2 FB  Incision:  None   Vulva:  Normal genitalia and Bartholin's, Urethra, [place]'s normal  Vagina:  normal with good muscle tone and blood in vault  Cervix:  Multiparous, and pink, moist, closed, without lesion or CMT.    Uterus:  fully involuted and non-tender    Adnexa:  Within normal limits and No masses, nodularity, tenderness  Recto-vaginal:   anus normal    ASSESSMENT:   Encounter Diagnoses   Name Primary?     Screening for cervical cancer Yes     Routine postpartum follow-up       Normal postpartum exam after   Pregnancy was complicated by:  none.      PLAN:  Orders Placed This Encounter   Procedures     Obtaining, preparing and conveyance of cervical or vaginal smear to laboratory.     Pap imaged thin layer screen with HPV - recommended age 30 - 65 years (select HPV order below)     HPV High Risk Types DNA Cervical      - Contraception methods discussed.  Reviewed hormonal vs. non-hormonal IUD side effects, benefits, effectiveness, fertility, and duration of use.  - Discussed calcium intake, vitamins and supplements including Vitamin D  - Exercise encouraged  - Pap with HPV cotesting today  - Discussed possible return of menses vs. prolonged postpartum bleeding/subinvolution. Pt advised to return in 2 weeks if bleeding persists.  - Follow up in 1 year for annual

## 2018-03-01 ASSESSMENT — PATIENT HEALTH QUESTIONNAIRE - PHQ9: SUM OF ALL RESPONSES TO PHQ QUESTIONS 1-9: 6

## 2018-03-01 ASSESSMENT — ANXIETY QUESTIONNAIRES: GAD7 TOTAL SCORE: 0

## 2018-03-02 ENCOUNTER — MEDICAL CORRESPONDENCE (OUTPATIENT)
Dept: HEALTH INFORMATION MANAGEMENT | Facility: CLINIC | Age: 33
End: 2018-03-02

## 2018-03-02 LAB
COPATH REPORT: NORMAL
PAP: NORMAL

## 2018-03-06 LAB
FINAL DIAGNOSIS: NORMAL
HPV HR 12 DNA CVX QL NAA+PROBE: NEGATIVE
HPV16 DNA SPEC QL NAA+PROBE: NEGATIVE
HPV18 DNA SPEC QL NAA+PROBE: NEGATIVE
SPECIMEN DESCRIPTION: NORMAL
SPECIMEN SOURCE CVX/VAG CYTO: NORMAL

## 2018-08-28 ENCOUNTER — OFFICE VISIT (OUTPATIENT)
Dept: OBGYN | Facility: CLINIC | Age: 33
End: 2018-08-28
Attending: ADVANCED PRACTICE MIDWIFE
Payer: COMMERCIAL

## 2018-08-28 VITALS
WEIGHT: 150.6 LBS | HEART RATE: 62 BPM | HEIGHT: 67 IN | DIASTOLIC BLOOD PRESSURE: 73 MMHG | BODY MASS INDEX: 23.64 KG/M2 | SYSTOLIC BLOOD PRESSURE: 116 MMHG

## 2018-08-28 DIAGNOSIS — R53.83 FATIGUE, UNSPECIFIED TYPE: Primary | ICD-10-CM

## 2018-08-28 LAB
ERYTHROCYTE [DISTWIDTH] IN BLOOD BY AUTOMATED COUNT: 13.5 % (ref 10–15)
HCT VFR BLD AUTO: 37.4 % (ref 35–47)
HGB BLD-MCNC: 12.1 G/DL (ref 11.7–15.7)
MCH RBC QN AUTO: 28.7 PG (ref 26.5–33)
MCHC RBC AUTO-ENTMCNC: 32.4 G/DL (ref 31.5–36.5)
MCV RBC AUTO: 89 FL (ref 78–100)
PLATELET # BLD AUTO: 212 10E9/L (ref 150–450)
RBC # BLD AUTO: 4.21 10E12/L (ref 3.8–5.2)
WBC # BLD AUTO: 7.1 10E9/L (ref 4–11)

## 2018-08-28 PROCEDURE — 85027 COMPLETE CBC AUTOMATED: CPT | Performed by: ADVANCED PRACTICE MIDWIFE

## 2018-08-28 PROCEDURE — G0463 HOSPITAL OUTPT CLINIC VISIT: HCPCS | Mod: ZF

## 2018-08-28 PROCEDURE — 36415 COLL VENOUS BLD VENIPUNCTURE: CPT | Performed by: ADVANCED PRACTICE MIDWIFE

## 2018-08-28 NOTE — MR AVS SNAPSHOT
"              After Visit Summary   8/28/2018    Joao العلي    MRN: [id number]           Patient Information     Date Of Birth          1985        Visit Information        Provider Department      8/28/2018 11:15 AM Heide Fink APRN CNM [facility]        Today's Diagnoses     Fatigue, unspecified type    -  1       Follow-ups after your visit        Follow-up notes from your care team     Return in about 6 months (around 2/28/2019) for Annual Well-Woman Exam.      Who to contact     Please call your clinic at [phone] to:    Ask questions about your health    Make or cancel appointments    Discuss your medicines    Learn about your test results    Speak to your doctor            Additional Information About Your Visit        [employer] Information     [employer] gives you secure access to your electronic health record. If you see a primary care provider, you can also send messages to your care team and make appointments. If you have questions, please call your primary care clinic.  If you do not have a primary care provider, please call [phone] and they will assist you.      [employer] is an electronic gateway that provides easy, online access to your medical records. With [employer], you can request a clinic appointment, read your test results, renew a prescription or communicate with your care team.     To access your existing account, please contact your [facility] or call [phone] for assistance.        Care EveryWhere ID     This is your Care EveryWhere ID. This could be used by other organizations to access your [place] medical records  [id number]        Your Vitals Were     Pulse Height BMI (Body Mass Index)             62 1.702 m (5' 7\") 23.59 kg/m2          Blood Pressure from Last 3 Encounters:   08/28/18 116/73   02/28/18 118/70   01/13/18 112/51    Weight from Last 3 Encounters:   08/28/18 68.3 kg (150 lb 9.6 oz)   02/28/18 " 73.7 kg (162 lb 6.4 oz)   01/11/18 81.5 kg (179 lb 9.6 oz)              We Performed the Following     CBC with Platelets        Primary Care Provider Office Phone # Fax #    Ciera Carrie Junior [phone]       [address]  [facility] [address]        Equal Access to Services     [facility]: Hadii aad ku hadasho Soomaali, waaxda luqadaha, qaybta kaalmada adeegyada, waxay idiin hayaan adeeg kharash la'aan ah. So [facility] [phone].    ATENCIÓN: Si habla español, tiene a case disposición servicios gratuitos de asistencia lingüística. Hector al [phone].    We comply with applicable federal civil rights laws and [place] laws. We do not discriminate on the basis of race, color, national origin, age, disability, sex, sexual orientation, or gender identity.            Thank you!     Thank you for choosing [facility]  for your care. Our goal is always to provide you with excellent care. Hearing back from our patients is one way we can continue to improve our services. Please take a few minutes to complete the written survey that you may receive in the mail after your visit with us. Thank you!             Your Updated Medication List - Protect others around you: Learn how to safely use, store and throw away your medicines at www.disposemymeds.org.          This list is accurate as of 8/28/18 11:59 PM.  Always use your most recent med list.                   Brand Name Dispense Instructions for use Diagnosis    Cholecalciferol 4000 units Tabs      Take 1 tablet by mouth daily    Supervision of other normal pregnancy, antepartum       PRENATAL 19 Chew      Take 1 tablet by mouth daily

## 2018-08-28 NOTE — PROGRESS NOTES
"SUBJECTIVE  Joao is a  who had an uncomplicated NSVB for baby girl \"Amber\" on 18. She reports doing well overall. Loves being a mom. Amber is a delightful and healthy baby who has been exclusively .     Joao presents today because she is concerned that her iron intake may not be adequate to provide Amber with the nutrients that she needs. She desires a CBC to confirm that she is not anemic. She does feel waves of fatigue, particularly while breastfeeding, but overall endorses good energy and does not have signs or symptoms of anemia. Denies SOB, chest pain, dizziness, weakness, ringing in ears, abnormal cravings. Joao eats a well-balanced diet with no particular restrictions presently, however, she did limit dairy until recently because Amber had reflux and that seemed to help. Does have a larger-than-usual appetite while breastfeeding.     LMP 8/3/2018. Considering more children, but not in the near future. Taking a PNV daily. Using condoms for contraception and happy with this BCM.    OBJECTIVE  /73 (BP Location: Left arm, Patient Position: Chair)  Pulse 62  Ht 1.702 m (5' 7\")  Wt 68.3 kg (150 lb 9.6 oz)  BMI 23.59 kg/m2  General: well-appearing with a bright affect and NAD  Mucous membranes pink  Additional physical exam deferred    ASSESSMENT/PLAN  Healthy 33-year-old  with occasional transient fatigue in the setting of breastfeeding.   CBC today  Discussed normal response of oxytocin while breastfeeding  Encouraged continue balanced diet and healthy lifestyle  RTC in 6-12 months for WWE, sooner prn.     Greater than 50% of this total 15 minute visit was spent on education and coordination of care as above.     Heide Fink, MAURO GUNNM      "

## 2018-08-28 NOTE — LETTER
"2018       RE: Joao العلي  [address]  [facility] [address]     Dear Colleague,    Thank you for referring your patient, Joao العلي, to the [facility] at [facility]. Please see a copy of my visit note below.    SUBJECTIVE  Joao is a  who had an uncomplicated NSVB for baby girl \"Amber\" on 18. She reports doing well overall. Loves being a mom. Amber is a delightful and healthy baby who has been exclusively .     Joao presents today because she is concerned that her iron intake may not be adequate to provide Amber with the nutrients that she needs. She desires a CBC to confirm that she is not anemic. She does feel waves of fatigue, particularly while breastfeeding, but overall endorses good energy and does not have signs or symptoms of anemia. Denies SOB, chest pain, dizziness, weakness, ringing in ears, abnormal cravings. Joao eats a well-balanced diet with no particular restrictions presently, however, she did limit dairy until recently because Amber had reflux and that seemed to help. Does have a larger-than-usual appetite while breastfeeding.     LMP 8/3/2018. Considering more children, but not in the near future. Taking a PNV daily. Using condoms for contraception and happy with this BCM.    OBJECTIVE  /73 (BP Location: Left arm, Patient Position: Chair)  Pulse 62  Ht 1.702 m (5' 7\")  Wt 68.3 kg (150 lb 9.6 oz)  BMI 23.59 kg/m2  General: well-appearing with a bright affect and NAD  Mucous membranes pink  Additional physical exam deferred    ASSESSMENT/PLAN  Healthy 33-year-old  with occasional transient fatigue in the setting of breastfeeding.   CBC today  Discussed normal response of oxytocin while breastfeeding  Encouraged continue balanced diet and healthy lifestyle  RTC in 6-12 months for WWE, sooner prn.     Greater than 50% of this total 15 minute visit was spent on education " and coordination of care as above.     MAURO FernandesM

## 2019-02-04 ENCOUNTER — OFFICE VISIT (OUTPATIENT)
Dept: URGENT CARE | Facility: URGENT CARE | Age: 34
End: 2019-02-04
Payer: COMMERCIAL

## 2019-02-04 VITALS
DIASTOLIC BLOOD PRESSURE: 80 MMHG | SYSTOLIC BLOOD PRESSURE: 117 MMHG | WEIGHT: 160.38 LBS | BODY MASS INDEX: 25.12 KG/M2 | OXYGEN SATURATION: 100 % | HEART RATE: 70 BPM | TEMPERATURE: 97.1 F

## 2019-02-04 DIAGNOSIS — R07.0 THROAT PAIN: Primary | ICD-10-CM

## 2019-02-04 LAB
DEPRECATED S PYO AG THROAT QL EIA: NORMAL
SPECIMEN SOURCE: NORMAL

## 2019-02-04 PROCEDURE — 99214 OFFICE O/P EST MOD 30 MIN: CPT | Performed by: PHYSICIAN ASSISTANT

## 2019-02-04 PROCEDURE — 87081 CULTURE SCREEN ONLY: CPT | Performed by: PHYSICIAN ASSISTANT

## 2019-02-04 PROCEDURE — 87880 STREP A ASSAY W/OPTIC: CPT | Performed by: PHYSICIAN ASSISTANT

## 2019-02-05 LAB
BACTERIA SPEC CULT: NORMAL
SPECIMEN SOURCE: NORMAL
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
## 2019-08-02 ENCOUNTER — OFFICE VISIT (OUTPATIENT)
Dept: OBGYN | Facility: CLINIC | Age: 34
End: 2019-08-02
Attending: ADVANCED PRACTICE MIDWIFE
Payer: COMMERCIAL

## 2019-08-02 ENCOUNTER — ANCILLARY PROCEDURE (OUTPATIENT)
Dept: ULTRASOUND IMAGING | Facility: CLINIC | Age: 34
End: 2019-08-02
Attending: ADVANCED PRACTICE MIDWIFE
Payer: COMMERCIAL

## 2019-08-02 VITALS — WEIGHT: 162.4 LBS | BODY MASS INDEX: 25.49 KG/M2 | HEIGHT: 67 IN

## 2019-08-02 DIAGNOSIS — O21.9 NAUSEA AND VOMITING IN PREGNANCY PRIOR TO 22 WEEKS GESTATION: ICD-10-CM

## 2019-08-02 DIAGNOSIS — Z34.80 SUPERVISION OF OTHER NORMAL PREGNANCY, ANTEPARTUM: Primary | ICD-10-CM

## 2019-08-02 DIAGNOSIS — Z34.91 ENCOUNTER FOR SUPERVISION OF NORMAL PREGNANCY IN FIRST TRIMESTER, UNSPECIFIED GRAVIDITY: ICD-10-CM

## 2019-08-02 LAB
ABO + RH BLD: NORMAL
ABO + RH BLD: NORMAL
BLD GP AB SCN SERPL QL: NORMAL
BLOOD BANK CMNT PATIENT-IMP: NORMAL
ERYTHROCYTE [DISTWIDTH] IN BLOOD BY AUTOMATED COUNT: 13.3 % (ref 10–15)
HCT VFR BLD AUTO: 35.9 % (ref 35–47)
HGB BLD-MCNC: 12.6 G/DL (ref 11.7–15.7)
MCH RBC QN AUTO: 30.2 PG (ref 26.5–33)
MCHC RBC AUTO-ENTMCNC: 35.1 G/DL (ref 31.5–36.5)
MCV RBC AUTO: 86 FL (ref 78–100)
PLATELET # BLD AUTO: 229 10E9/L (ref 150–450)
RBC # BLD AUTO: 4.17 10E12/L (ref 3.8–5.2)
SPECIMEN EXP DATE BLD: NORMAL
WBC # BLD AUTO: 10.1 10E9/L (ref 4–11)

## 2019-08-02 PROCEDURE — 87340 HEPATITIS B SURFACE AG IA: CPT | Performed by: ADVANCED PRACTICE MIDWIFE

## 2019-08-02 PROCEDURE — 87086 URINE CULTURE/COLONY COUNT: CPT | Performed by: ADVANCED PRACTICE MIDWIFE

## 2019-08-02 PROCEDURE — 86850 RBC ANTIBODY SCREEN: CPT | Performed by: ADVANCED PRACTICE MIDWIFE

## 2019-08-02 PROCEDURE — 87389 HIV-1 AG W/HIV-1&-2 AB AG IA: CPT | Performed by: ADVANCED PRACTICE MIDWIFE

## 2019-08-02 PROCEDURE — 86803 HEPATITIS C AB TEST: CPT | Performed by: ADVANCED PRACTICE MIDWIFE

## 2019-08-02 PROCEDURE — 76801 OB US < 14 WKS SINGLE FETUS: CPT

## 2019-08-02 PROCEDURE — 82306 VITAMIN D 25 HYDROXY: CPT | Performed by: ADVANCED PRACTICE MIDWIFE

## 2019-08-02 PROCEDURE — 36415 COLL VENOUS BLD VENIPUNCTURE: CPT | Performed by: ADVANCED PRACTICE MIDWIFE

## 2019-08-02 PROCEDURE — 86762 RUBELLA ANTIBODY: CPT | Performed by: ADVANCED PRACTICE MIDWIFE

## 2019-08-02 PROCEDURE — 86901 BLOOD TYPING SEROLOGIC RH(D): CPT | Performed by: ADVANCED PRACTICE MIDWIFE

## 2019-08-02 PROCEDURE — 85027 COMPLETE CBC AUTOMATED: CPT | Performed by: ADVANCED PRACTICE MIDWIFE

## 2019-08-02 PROCEDURE — 86780 TREPONEMA PALLIDUM: CPT | Performed by: ADVANCED PRACTICE MIDWIFE

## 2019-08-02 PROCEDURE — G0463 HOSPITAL OUTPT CLINIC VISIT: HCPCS | Mod: 25,ZF

## 2019-08-02 PROCEDURE — 86706 HEP B SURFACE ANTIBODY: CPT | Performed by: ADVANCED PRACTICE MIDWIFE

## 2019-08-02 PROCEDURE — 86900 BLOOD TYPING SEROLOGIC ABO: CPT | Performed by: ADVANCED PRACTICE MIDWIFE

## 2019-08-02 ASSESSMENT — MIFFLIN-ST. JEOR: SCORE: 1474.39

## 2019-08-02 ASSESSMENT — PAIN SCALES - GENERAL: PAINLEVEL: NO PAIN (0)
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
## 2019-08-03 LAB — T PALLIDUM AB SER QL: NONREACTIVE

## 2019-08-04 LAB
DEPRECATED CALCIDIOL+CALCIFEROL SERPL-MC: 33 UG/L (ref 20–75)
HBV SURFACE AB SERPL IA-ACNC: 348.4 M[IU]/ML
HBV SURFACE AG SERPL QL IA: NONREACTIVE
HCV AB SERPL QL IA: NONREACTIVE
HIV 1+2 AB+HIV1 P24 AG SERPL QL IA: NONREACTIVE

## 2019-08-05 LAB
BACTERIA SPEC CULT: NORMAL
Lab: NORMAL
RUBV IGG SERPL IA-ACNC: 16 IU/ML
SPECIMEN SOURCE: NORMAL

## 2019-08-16 ENCOUNTER — OFFICE VISIT (OUTPATIENT)
Dept: OBGYN | Facility: CLINIC | Age: 34
End: 2019-08-16
Attending: ADVANCED PRACTICE MIDWIFE
Payer: COMMERCIAL

## 2019-08-16 VITALS
WEIGHT: 164.1 LBS | DIASTOLIC BLOOD PRESSURE: 65 MMHG | SYSTOLIC BLOOD PRESSURE: 106 MMHG | BODY MASS INDEX: 25.75 KG/M2 | HEART RATE: 83 BPM | HEIGHT: 67 IN

## 2019-08-16 DIAGNOSIS — Z34.91 ENCOUNTER FOR SUPERVISION OF NORMAL PREGNANCY IN FIRST TRIMESTER, UNSPECIFIED GRAVIDITY: Primary | ICD-10-CM

## 2019-08-16 DIAGNOSIS — Z34.80 SUPERVISION OF OTHER NORMAL PREGNANCY, ANTEPARTUM: ICD-10-CM

## 2019-08-16 PROCEDURE — G0463 HOSPITAL OUTPT CLINIC VISIT: HCPCS | Mod: ZF

## 2019-08-16 ASSESSMENT — ANXIETY QUESTIONNAIRES
GAD7 TOTAL SCORE: 1
3. WORRYING TOO MUCH ABOUT DIFFERENT THINGS: NOT AT ALL
2. NOT BEING ABLE TO STOP OR CONTROL WORRYING: NOT AT ALL
6. BECOMING EASILY ANNOYED OR IRRITABLE: NOT AT ALL
5. BEING SO RESTLESS THAT IT IS HARD TO SIT STILL: NOT AT ALL
7. FEELING AFRAID AS IF SOMETHING AWFUL MIGHT HAPPEN: NOT AT ALL
1. FEELING NERVOUS, ANXIOUS, OR ON EDGE: NOT AT ALL

## 2019-08-16 ASSESSMENT — MIFFLIN-ST. JEOR: SCORE: 1481.98

## 2019-08-16 ASSESSMENT — PATIENT HEALTH QUESTIONNAIRE - PHQ9: 5. POOR APPETITE OR OVEREATING: SEVERAL DAYS
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
## 2019-08-17 ASSESSMENT — ANXIETY QUESTIONNAIRES: GAD7 TOTAL SCORE: 1

## 2019-08-19 ENCOUNTER — TELEPHONE (OUTPATIENT)
Dept: OBGYN | Facility: CLINIC | Age: 34
End: 2019-08-19

## 2019-08-19 DIAGNOSIS — Z34.80 SUPERVISION OF OTHER NORMAL PREGNANCY, ANTEPARTUM: Primary | ICD-10-CM

## 2019-08-19 NOTE — TELEPHONE ENCOUNTER
Joao seen in clinic 8/16/19 for Physical OB appt, Urine for Chlamydia and Gonorrhea ordered and lab never received specimen.     Tests to be cancelled by lab and I will re-order them and have urine collected at next NILSA appt. I will put in pink OB sticky note to collect at next OB appt.

## 2019-10-16 ENCOUNTER — PRE VISIT (OUTPATIENT)
Dept: MATERNAL FETAL MEDICINE | Facility: CLINIC | Age: 34
End: 2019-10-16

## 2019-10-18 ENCOUNTER — OFFICE VISIT (OUTPATIENT)
Dept: MATERNAL FETAL MEDICINE | Facility: CLINIC | Age: 34
End: 2019-10-18
Attending: ADVANCED PRACTICE MIDWIFE
Payer: COMMERCIAL

## 2019-10-18 ENCOUNTER — HOSPITAL ENCOUNTER (OUTPATIENT)
Dept: ULTRASOUND IMAGING | Facility: CLINIC | Age: 34
Discharge: HOME | End: 2019-10-18
Attending: ADVANCED PRACTICE MIDWIFE | Admitting: ADVANCED PRACTICE MIDWIFE
Payer: COMMERCIAL

## 2019-10-18 ENCOUNTER — OFFICE VISIT (OUTPATIENT)
Dept: OBGYN | Facility: CLINIC | Age: 34
End: 2019-10-18
Attending: MIDWIFE
Payer: COMMERCIAL

## 2019-10-18 VITALS
DIASTOLIC BLOOD PRESSURE: 80 MMHG | BODY MASS INDEX: 27.72 KG/M2 | HEART RATE: 112 BPM | SYSTOLIC BLOOD PRESSURE: 115 MMHG | WEIGHT: 177 LBS

## 2019-10-18 DIAGNOSIS — Z87.59 HISTORY OF PRIOR PREGNANCY WITH SMALL FOR GESTATIONAL AGE NEWBORN: Primary | ICD-10-CM

## 2019-10-18 DIAGNOSIS — O35.9XX0 SUSPECTED FETAL ANOMALY, ANTEPARTUM, NOT APPLICABLE OR UNSPECIFIED FETUS: ICD-10-CM

## 2019-10-18 DIAGNOSIS — Z34.80 SUPERVISION OF OTHER NORMAL PREGNANCY, ANTEPARTUM: Primary | ICD-10-CM

## 2019-10-18 DIAGNOSIS — O26.90 PREGNANCY RELATED CONDITION, ANTEPARTUM: ICD-10-CM

## 2019-10-18 PROCEDURE — 76811 OB US DETAILED SNGL FETUS: CPT

## 2019-10-18 PROCEDURE — G0463 HOSPITAL OUTPT CLINIC VISIT: HCPCS | Mod: 25

## 2019-10-18 ASSESSMENT — ANXIETY QUESTIONNAIRES
GAD7 TOTAL SCORE: 0
7. FEELING AFRAID AS IF SOMETHING AWFUL MIGHT HAPPEN: NOT AT ALL
1. FEELING NERVOUS, ANXIOUS, OR ON EDGE: NOT AT ALL
3. WORRYING TOO MUCH ABOUT DIFFERENT THINGS: NOT AT ALL
5. BEING SO RESTLESS THAT IT IS HARD TO SIT STILL: NOT AT ALL
2. NOT BEING ABLE TO STOP OR CONTROL WORRYING: NOT AT ALL
6. BECOMING EASILY ANNOYED OR IRRITABLE: NOT AT ALL

## 2019-10-18 ASSESSMENT — PAIN SCALES - GENERAL: PAINLEVEL: NO PAIN (0)

## 2019-10-18 ASSESSMENT — PATIENT HEALTH QUESTIONNAIRE - PHQ9
5. POOR APPETITE OR OVEREATING: NOT AT ALL
SUM OF ALL RESPONSES TO PHQ QUESTIONS 1-9: 3

## 2019-10-18 NOTE — PROGRESS NOTES
Subjective:      34 year old  at 20w1d presents for a routine prenatal appointment.       no vaginal bleeding or leakage of fluid.  no contractions or cramping.    good  fetal movement.       No HA, visual changes, RUQ or epigastric pain.   The patient presents with the following concerns: has level 2 US today spouse here  No concerns  Will leave urine for GC/Chlamydia screen     Level II US  Scheduled.      Objective:  Vitals:    10/18/19 1017   BP: 115/80   Pulse: 112   Weight: 80.3 kg (177 lb)     See OB flowsheet    Assessment/Plan     Encounter Diagnosis   Name Primary?     Supervision of other normal pregnancy, antepartum - [facility] CNM Yes     GC/Chlamydia collected     - Reviewed total weight gain, encouraged continued healthy diet and exercise.      - Reviewed why/how to contact provider.    Patient education/orders or handouts today:  PTL signs/symptoms, Fetal movement, Level 2 u/s scheduled and Plan for EOB visit w labs   Return to clinic in 6 weeks and prn if questions or concerns.   MAURO BentleyM

## 2019-10-18 NOTE — LETTER
10/18/2019       RE: Joao العلي  [address] [facility] [address]     Dear Colleague,    Thank you for referring your patient, Joao العلي, to the [facility] at [facility]. Please see a copy of my visit note below.    Subjective:     34 year old  at 20w1d presents for a routine prenatal appointment.      no vaginal bleeding or leakage of fluid.  no contractions or cramping.   good  fetal movement.       No HA, visual changes, RUQ or epigastric pain.   The patient presents with the following concerns: has level 2 US today spouse here  No concerns  Will leave urine for GC/Chlamydia screen     Level II US  Scheduled.      Objective:  Vitals:    10/18/19 1017   BP: 115/80   Pulse: 112   Weight: 80.3 kg (177 lb)   See OB flowsheet    Assessment/Plan  Encounter Diagnosis   Name Primary?     Supervision of other normal pregnancy, antepartum - [facility] CNM Yes     GC/Chlamydia collected     - Reviewed total weight gain, encouraged continued healthy diet and exercise.      - Reviewed why/how to contact provider.   Patient education/orders or handouts today:  PTL signs/symptoms, Fetal movement, Level 2 u/s scheduled and Plan for EOB visit w labs  Return to clinic in 6 weeks and prn if questions or concerns.     MAURO Bentley CNM                
[phone]

## 2019-10-18 NOTE — PROGRESS NOTES
Please see ultrasound report under imaging tab for details on ultrasound performed today.    Becky Montes MD  , OB/GYN  Maternal-Fetal Medicine  [email or web address]  [phone] (Academic office)  [phone] (Pager)

## 2019-10-19 ASSESSMENT — ANXIETY QUESTIONNAIRES: GAD7 TOTAL SCORE: 0

## 2019-11-26 ENCOUNTER — HOSPITAL ENCOUNTER (OUTPATIENT)
Dept: ULTRASOUND IMAGING | Facility: CLINIC | Age: 34
Discharge: HOME | End: 2019-11-26
Attending: OBSTETRICS & GYNECOLOGY | Admitting: OBSTETRICS & GYNECOLOGY
Payer: COMMERCIAL

## 2019-11-26 ENCOUNTER — OFFICE VISIT (OUTPATIENT)
Dept: MATERNAL FETAL MEDICINE | Facility: CLINIC | Age: 34
End: 2019-11-26
Attending: OBSTETRICS & GYNECOLOGY
Payer: COMMERCIAL

## 2019-11-26 DIAGNOSIS — O35.9XX0 SUSPECTED FETAL ANOMALY, ANTEPARTUM, NOT APPLICABLE OR UNSPECIFIED FETUS: ICD-10-CM

## 2019-11-26 DIAGNOSIS — Z87.59 HISTORY OF PRIOR PREGNANCY WITH SMALL FOR GESTATIONAL AGE NEWBORN: Primary | ICD-10-CM

## 2019-11-26 PROCEDURE — 76816 OB US FOLLOW-UP PER FETUS: CPT

## 2019-11-26 NOTE — PROGRESS NOTES
"Please see \"Imaging\" tab under Chart Review for full details.    Leora Koo MD  Maternal Fetal Medicine    "

## 2019-12-23 ENCOUNTER — OFFICE VISIT (OUTPATIENT)
Dept: OBGYN | Facility: CLINIC | Age: 34
End: 2019-12-23
Attending: ADVANCED PRACTICE MIDWIFE
Payer: COMMERCIAL

## 2019-12-23 VITALS
SYSTOLIC BLOOD PRESSURE: 96 MMHG | HEART RATE: 97 BPM | BODY MASS INDEX: 30.34 KG/M2 | DIASTOLIC BLOOD PRESSURE: 58 MMHG | WEIGHT: 193.7 LBS

## 2019-12-23 DIAGNOSIS — Z34.80 SUPERVISION OF OTHER NORMAL PREGNANCY, ANTEPARTUM: Primary | ICD-10-CM

## 2019-12-23 LAB
DEPRECATED CALCIDIOL+CALCIFEROL SERPL-MC: 38 UG/L (ref 20–75)
ERYTHROCYTE [DISTWIDTH] IN BLOOD BY AUTOMATED COUNT: 12.7 % (ref 10–15)
GLUCOSE 1H P 50 G GLC PO SERPL-MCNC: 133 MG/DL (ref 60–129)
HCT VFR BLD AUTO: 34.7 % (ref 35–47)
HGB BLD-MCNC: 11.5 G/DL (ref 11.7–15.7)
MCH RBC QN AUTO: 30.1 PG (ref 26.5–33)
MCHC RBC AUTO-ENTMCNC: 33.1 G/DL (ref 31.5–36.5)
MCV RBC AUTO: 91 FL (ref 78–100)
PLATELET # BLD AUTO: 203 10E9/L (ref 150–450)
RBC # BLD AUTO: 3.82 10E12/L (ref 3.8–5.2)
T PALLIDUM AB SER QL: NONREACTIVE
WBC # BLD AUTO: 12.2 10E9/L (ref 4–11)

## 2019-12-23 PROCEDURE — 87591 N.GONORRHOEAE DNA AMP PROB: CPT | Performed by: ADVANCED PRACTICE MIDWIFE

## 2019-12-23 PROCEDURE — 87491 CHLMYD TRACH DNA AMP PROBE: CPT | Performed by: ADVANCED PRACTICE MIDWIFE

## 2019-12-23 PROCEDURE — 36415 COLL VENOUS BLD VENIPUNCTURE: CPT | Performed by: ADVANCED PRACTICE MIDWIFE

## 2019-12-23 PROCEDURE — 86780 TREPONEMA PALLIDUM: CPT | Performed by: ADVANCED PRACTICE MIDWIFE

## 2019-12-23 PROCEDURE — 82306 VITAMIN D 25 HYDROXY: CPT | Performed by: ADVANCED PRACTICE MIDWIFE

## 2019-12-23 PROCEDURE — G0463 HOSPITAL OUTPT CLINIC VISIT: HCPCS | Mod: ZF

## 2019-12-23 PROCEDURE — 82950 GLUCOSE TEST: CPT | Performed by: ADVANCED PRACTICE MIDWIFE

## 2019-12-23 PROCEDURE — 85027 COMPLETE CBC AUTOMATED: CPT | Performed by: ADVANCED PRACTICE MIDWIFE

## 2019-12-23 ASSESSMENT — ANXIETY QUESTIONNAIRES
5. BEING SO RESTLESS THAT IT IS HARD TO SIT STILL: SEVERAL DAYS
2. NOT BEING ABLE TO STOP OR CONTROL WORRYING: NOT AT ALL
1. FEELING NERVOUS, ANXIOUS, OR ON EDGE: NOT AT ALL
7. FEELING AFRAID AS IF SOMETHING AWFUL MIGHT HAPPEN: NOT AT ALL
6. BECOMING EASILY ANNOYED OR IRRITABLE: SEVERAL DAYS
GAD7 TOTAL SCORE: 3
3. WORRYING TOO MUCH ABOUT DIFFERENT THINGS: NOT AT ALL

## 2019-12-23 ASSESSMENT — PAIN SCALES - GENERAL: PAINLEVEL: NO PAIN (0)

## 2019-12-23 ASSESSMENT — PATIENT HEALTH QUESTIONNAIRE - PHQ9
SUM OF ALL RESPONSES TO PHQ QUESTIONS 1-9: 3
5. POOR APPETITE OR OVEREATING: SEVERAL DAYS
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
## 2019-12-24 LAB
C TRACH DNA SPEC QL NAA+PROBE: NEGATIVE
N GONORRHOEA DNA SPEC QL NAA+PROBE: NEGATIVE
SPECIMEN SOURCE: NORMAL
SPECIMEN SOURCE: NORMAL

## 2019-12-24 ASSESSMENT — ANXIETY QUESTIONNAIRES: GAD7 TOTAL SCORE: 3

## 2019-12-26 ENCOUNTER — TELEPHONE (OUTPATIENT)
Dept: OBGYN | Facility: CLINIC | Age: 34
End: 2019-12-26

## 2019-12-26 DIAGNOSIS — R73.09 ELEVATED GLUCOSE: Primary | ICD-10-CM

## 2019-12-26 NOTE — TELEPHONE ENCOUNTER
Discussed elevate glucose results with Hamdi and recommended scheduling 3 hour glucose test as indicated. Pt expressed understanding and has no questions. Pt will present to lab on 12/31 at 7:30 for GTT.

## 2019-12-31 DIAGNOSIS — R73.09 ELEVATED GLUCOSE: ICD-10-CM

## 2019-12-31 LAB
GLUCOSE 1H P 100 G GLC PO SERPL-MCNC: 154 MG/DL (ref 60–179)
GLUCOSE 2H P 100 G GLC PO SERPL-MCNC: 114 MG/DL (ref 60–154)
GLUCOSE 3H P 100 G GLC PO SERPL-MCNC: 102 MG/DL (ref 60–139)
GLUCOSE P FAST SERPL-MCNC: 90 MG/DL (ref 60–94)

## 2019-12-31 PROCEDURE — 36415 COLL VENOUS BLD VENIPUNCTURE: CPT | Performed by: MIDWIFE

## 2019-12-31 PROCEDURE — 82951 GLUCOSE TOLERANCE TEST (GTT): CPT | Performed by: MIDWIFE

## 2019-12-31 PROCEDURE — 82952 GTT-ADDED SAMPLES: CPT | Performed by: MIDWIFE

## 2020-01-20 ENCOUNTER — OFFICE VISIT (OUTPATIENT)
Dept: MATERNAL FETAL MEDICINE | Facility: CLINIC | Age: 35
End: 2020-01-20
Attending: OBSTETRICS & GYNECOLOGY
Payer: COMMERCIAL

## 2020-01-20 ENCOUNTER — HOSPITAL ENCOUNTER (OUTPATIENT)
Dept: ULTRASOUND IMAGING | Facility: CLINIC | Age: 35
Discharge: HOME | End: 2020-01-20
Attending: OBSTETRICS & GYNECOLOGY | Admitting: OBSTETRICS & GYNECOLOGY
Payer: COMMERCIAL

## 2020-01-20 DIAGNOSIS — Z87.59 HISTORY OF PRIOR PREGNANCY WITH SMALL FOR GESTATIONAL AGE NEWBORN: Primary | ICD-10-CM

## 2020-01-20 DIAGNOSIS — Z87.59 HISTORY OF PRIOR PREGNANCY WITH SMALL FOR GESTATIONAL AGE NEWBORN: ICD-10-CM

## 2020-01-20 PROCEDURE — 76816 OB US FOLLOW-UP PER FETUS: CPT

## 2020-01-20 NOTE — PROGRESS NOTES
"Please see \"Imaging\" tab under \"Chart Review\" for details of today's US.    Lashon Mccurdy, DO    "

## 2020-02-14 ENCOUNTER — OFFICE VISIT (OUTPATIENT)
Dept: OBGYN | Facility: CLINIC | Age: 35
End: 2020-02-14
Attending: MIDWIFE
Payer: COMMERCIAL

## 2020-02-14 VITALS
SYSTOLIC BLOOD PRESSURE: 110 MMHG | HEART RATE: 92 BPM | WEIGHT: 205 LBS | DIASTOLIC BLOOD PRESSURE: 70 MMHG | HEIGHT: 67 IN | BODY MASS INDEX: 32.18 KG/M2

## 2020-02-14 DIAGNOSIS — Z34.80 SUPERVISION OF OTHER NORMAL PREGNANCY, ANTEPARTUM: Primary | ICD-10-CM

## 2020-02-14 PROBLEM — O21.9 NAUSEA AND VOMITING IN PREGNANCY PRIOR TO 22 WEEKS GESTATION: Status: RESOLVED | Noted: 2019-08-02 | Resolved: 2020-02-14

## 2020-02-14 LAB
ERYTHROCYTE [DISTWIDTH] IN BLOOD BY AUTOMATED COUNT: 13.1 % (ref 10–15)
HCT VFR BLD AUTO: 34.9 % (ref 35–47)
HGB BLD-MCNC: 12 G/DL (ref 11.7–15.7)
MCH RBC QN AUTO: 30.2 PG (ref 26.5–33)
MCHC RBC AUTO-ENTMCNC: 34.4 G/DL (ref 31.5–36.5)
MCV RBC AUTO: 88 FL (ref 78–100)
PLATELET # BLD AUTO: 166 10E9/L (ref 150–450)
RBC # BLD AUTO: 3.98 10E12/L (ref 3.8–5.2)
WBC # BLD AUTO: 10.9 10E9/L (ref 4–11)

## 2020-02-14 PROCEDURE — 87653 STREP B DNA AMP PROBE: CPT | Performed by: MIDWIFE

## 2020-02-14 PROCEDURE — 36415 COLL VENOUS BLD VENIPUNCTURE: CPT | Performed by: MIDWIFE

## 2020-02-14 PROCEDURE — 85027 COMPLETE CBC AUTOMATED: CPT | Performed by: MIDWIFE

## 2020-02-14 ASSESSMENT — PAIN SCALES - GENERAL: PAINLEVEL: NO PAIN (0)

## 2020-02-14 ASSESSMENT — MIFFLIN-ST. JEOR: SCORE: 1662.62

## 2020-02-14 ASSESSMENT — PATIENT HEALTH QUESTIONNAIRE - PHQ9: SUM OF ALL RESPONSES TO PHQ QUESTIONS 1-9: 0

## 2020-02-14 NOTE — LETTER
"2020       RE: Joao العلي  [address] [address]     Dear Colleague,    Thank you for referring your patient, Joao العلي, to the [facility] at [facility]. Please see a copy of my visit note below.    Subjective:      35 year old  at 37w1d presents for a routine prenatal appointment.    no vaginal bleeding,  leakage of fluid, or change in vaginal discharge.  occ  contractions.  Good  fetal movement.     No HA, visual changes, RUQ or epigastric pain.   Patient concerns: had normal growth US  Wants to be able to move  Use [employer]  Birth ball   Feeling well overall.     Objective:  Vitals:    20 0924   BP: 110/70   Pulse: 92   Weight: 93 kg (205 lb)   Height: 1.702 m (5' 7.01\")    See OB flowsheet    Assessment/Plan  Encounter Diagnosis   Name Primary?     Supervision of other normal pregnancy, antepartum - [facility] CNM Yes     PHQ-9 SCORE 2018 10/18/2019 2019   PHQ-9 Total Score 6 3 3     GBS screening: Obtained.  Birth preferences reviewed: Un-Medicated  Maybe nitrous  [place] for labor support    Labor signs discussed. Reinforced daily fetal movement counts.  Reviewed why/how to contact provider if headache/visual changes/RUQ or epigastric pain, decreased fetal movement, vaginal bleeding, leakage of fluid.   Return to clinic in 1 week and prn if questions or concerns.     MAURO Bentley CNM      "

## 2020-02-14 NOTE — PROGRESS NOTES
"Subjective:      35 year old  at 37w1d presents for a routine prenatal appointment.    no vaginal bleeding,  leakage of fluid, or change in vaginal discharge.  occ  contractions.  Good  fetal movement.     No HA, visual changes, RUQ or epigastric pain.   Patient concerns: had normal growth US  Wants to be able to move  Use [email or web address]  Birth ball   Feeling well overall.   Objective:  Vitals:    20 0924   BP: 110/70   Pulse: 92   Weight: 93 kg (205 lb)   Height: 1.702 m (5' 7.01\")    See OB flowsheet    Assessment/Plan     Encounter Diagnosis   Name Primary?     Supervision of other normal pregnancy, antepartum - [facility] CNM Yes         PHQ-9 SCORE 2018 10/18/2019 2019   PHQ-9 Total Score 6 3 3     GBS screening: Obtained.  Birth preferences reviewed: Un-Medicated  Maybe nitrous  [place] for labor support    Labor signs discussed. Reinforced daily fetal movement counts.  Reviewed why/how to contact provider if headache/visual changes/RUQ or epigastric pain, decreased fetal movement, vaginal bleeding, leakage of fluid.   Return to clinic in 1 week and prn if questions or concerns.   MAURO Bentley CNM  "

## 2020-02-15 LAB
GP B STREP DNA SPEC QL NAA+PROBE: NEGATIVE
SPECIMEN SOURCE: NORMAL

## 2020-02-16 ENCOUNTER — HOSPITAL ENCOUNTER (INPATIENT)
Facility: CLINIC | Age: 35
LOS: 1 days | Discharge: HOME HEALTH | End: 2020-02-18
Attending: ADVANCED PRACTICE MIDWIFE | Admitting: ADVANCED PRACTICE MIDWIFE
Payer: COMMERCIAL

## 2020-02-16 DIAGNOSIS — Z34.80 SUPERVISION OF OTHER NORMAL PREGNANCY, ANTEPARTUM: ICD-10-CM

## 2020-02-16 PROCEDURE — G0463 HOSPITAL OUTPT CLINIC VISIT: HCPCS

## 2020-02-16 ASSESSMENT — MIFFLIN-ST. JEOR: SCORE: 1662.5

## 2020-02-17 PROCEDURE — 25000132 ZZH RX MED GY IP 250 OP 250 PS 637: Performed by: ADVANCED PRACTICE MIDWIFE

## 2020-02-17 PROCEDURE — 12000001 ZZH R&B MED SURG/OB UMMC

## 2020-02-17 PROCEDURE — 25000128 H RX IP 250 OP 636: Performed by: ADVANCED PRACTICE MIDWIFE

## 2020-02-17 PROCEDURE — 72200001 ZZH LABOR CARE VAGINAL DELIVERY SINGLE

## 2020-02-17 PROCEDURE — 25000125 ZZHC RX 250

## 2020-02-17 RX ORDER — MISOPROSTOL 200 UG/1
400 TABLET ORAL
Status: DISCONTINUED | OUTPATIENT
Start: 2020-02-17 | End: 2020-02-18 | Stop reason: HOSPADM

## 2020-02-17 RX ORDER — OXYTOCIN 10 [USP'U]/ML
10 INJECTION, SOLUTION INTRAMUSCULAR; INTRAVENOUS
Status: DISCONTINUED | OUTPATIENT
Start: 2020-02-17 | End: 2020-02-17

## 2020-02-17 RX ORDER — LIDOCAINE HYDROCHLORIDE 10 MG/ML
INJECTION, SOLUTION EPIDURAL; INFILTRATION; INTRACAUDAL; PERINEURAL
Status: DISCONTINUED
Start: 2020-02-17 | End: 2020-02-17 | Stop reason: HOSPADM

## 2020-02-17 RX ORDER — AMOXICILLIN 250 MG
1 CAPSULE ORAL 2 TIMES DAILY
Status: DISCONTINUED | OUTPATIENT
Start: 2020-02-17 | End: 2020-02-18 | Stop reason: HOSPADM

## 2020-02-17 RX ORDER — OXYTOCIN/0.9 % SODIUM CHLORIDE 30/500 ML
100-340 PLASTIC BAG, INJECTION (ML) INTRAVENOUS CONTINUOUS PRN
Status: COMPLETED | OUTPATIENT
Start: 2020-02-17 | End: 2020-02-17

## 2020-02-17 RX ORDER — SODIUM CHLORIDE, SODIUM LACTATE, POTASSIUM CHLORIDE, CALCIUM CHLORIDE 600; 310; 30; 20 MG/100ML; MG/100ML; MG/100ML; MG/100ML
INJECTION, SOLUTION INTRAVENOUS CONTINUOUS
Status: DISCONTINUED | OUTPATIENT
Start: 2020-02-17 | End: 2020-02-17

## 2020-02-17 RX ORDER — OXYCODONE AND ACETAMINOPHEN 5; 325 MG/1; MG/1
1 TABLET ORAL
Status: DISCONTINUED | OUTPATIENT
Start: 2020-02-17 | End: 2020-02-17

## 2020-02-17 RX ORDER — IBUPROFEN 800 MG/1
800 TABLET, FILM COATED ORAL
Status: COMPLETED | OUTPATIENT
Start: 2020-02-17 | End: 2020-02-17

## 2020-02-17 RX ORDER — OXYTOCIN/0.9 % SODIUM CHLORIDE 30/500 ML
PLASTIC BAG, INJECTION (ML) INTRAVENOUS
Status: COMPLETED
Start: 2020-02-17 | End: 2020-02-17

## 2020-02-17 RX ORDER — OXYTOCIN 10 [USP'U]/ML
INJECTION, SOLUTION INTRAMUSCULAR; INTRAVENOUS
Status: DISCONTINUED
Start: 2020-02-17 | End: 2020-02-17 | Stop reason: HOSPADM

## 2020-02-17 RX ORDER — LANOLIN 100 %
OINTMENT (GRAM) TOPICAL
Status: DISCONTINUED | OUTPATIENT
Start: 2020-02-17 | End: 2020-02-18 | Stop reason: HOSPADM

## 2020-02-17 RX ORDER — ACETAMINOPHEN 325 MG/1
650 TABLET ORAL EVERY 4 HOURS PRN
Status: DISCONTINUED | OUTPATIENT
Start: 2020-02-17 | End: 2020-02-17

## 2020-02-17 RX ORDER — OXYTOCIN/0.9 % SODIUM CHLORIDE 30/500 ML
340 PLASTIC BAG, INJECTION (ML) INTRAVENOUS CONTINUOUS PRN
Status: DISCONTINUED | OUTPATIENT
Start: 2020-02-17 | End: 2020-02-18 | Stop reason: HOSPADM

## 2020-02-17 RX ORDER — ONDANSETRON 2 MG/ML
4 INJECTION INTRAMUSCULAR; INTRAVENOUS EVERY 6 HOURS PRN
Status: DISCONTINUED | OUTPATIENT
Start: 2020-02-17 | End: 2020-02-17

## 2020-02-17 RX ORDER — CARBOPROST TROMETHAMINE 250 UG/ML
250 INJECTION, SOLUTION INTRAMUSCULAR
Status: DISCONTINUED | OUTPATIENT
Start: 2020-02-17 | End: 2020-02-17

## 2020-02-17 RX ORDER — AMOXICILLIN 250 MG
2 CAPSULE ORAL 2 TIMES DAILY
Status: DISCONTINUED | OUTPATIENT
Start: 2020-02-17 | End: 2020-02-18 | Stop reason: HOSPADM

## 2020-02-17 RX ORDER — HYDROCORTISONE 2.5 %
CREAM (GRAM) TOPICAL 3 TIMES DAILY PRN
Status: DISCONTINUED | OUTPATIENT
Start: 2020-02-17 | End: 2020-02-18 | Stop reason: HOSPADM

## 2020-02-17 RX ORDER — ACETAMINOPHEN 325 MG/1
650 TABLET ORAL EVERY 4 HOURS PRN
Status: DISCONTINUED | OUTPATIENT
Start: 2020-02-17 | End: 2020-02-18 | Stop reason: HOSPADM

## 2020-02-17 RX ORDER — IBUPROFEN 800 MG/1
800 TABLET, FILM COATED ORAL EVERY 6 HOURS PRN
Status: DISCONTINUED | OUTPATIENT
Start: 2020-02-17 | End: 2020-02-18 | Stop reason: HOSPADM

## 2020-02-17 RX ORDER — MISOPROSTOL 200 UG/1
TABLET ORAL
Status: DISCONTINUED
Start: 2020-02-17 | End: 2020-02-17 | Stop reason: HOSPADM

## 2020-02-17 RX ORDER — OXYTOCIN 10 [USP'U]/ML
10 INJECTION, SOLUTION INTRAMUSCULAR; INTRAVENOUS
Status: DISCONTINUED | OUTPATIENT
Start: 2020-02-17 | End: 2020-02-18 | Stop reason: HOSPADM

## 2020-02-17 RX ORDER — OXYTOCIN/0.9 % SODIUM CHLORIDE 30/500 ML
100 PLASTIC BAG, INJECTION (ML) INTRAVENOUS CONTINUOUS
Status: DISCONTINUED | OUTPATIENT
Start: 2020-02-17 | End: 2020-02-18 | Stop reason: HOSPADM

## 2020-02-17 RX ORDER — NALOXONE HYDROCHLORIDE 0.4 MG/ML
.1-.4 INJECTION, SOLUTION INTRAMUSCULAR; INTRAVENOUS; SUBCUTANEOUS
Status: DISCONTINUED | OUTPATIENT
Start: 2020-02-17 | End: 2020-02-18 | Stop reason: HOSPADM

## 2020-02-17 RX ORDER — NALOXONE HYDROCHLORIDE 0.4 MG/ML
.1-.4 INJECTION, SOLUTION INTRAMUSCULAR; INTRAVENOUS; SUBCUTANEOUS
Status: DISCONTINUED | OUTPATIENT
Start: 2020-02-17 | End: 2020-02-17

## 2020-02-17 RX ORDER — BISACODYL 10 MG
10 SUPPOSITORY, RECTAL RECTAL DAILY PRN
Status: DISCONTINUED | OUTPATIENT
Start: 2020-02-19 | End: 2020-02-18 | Stop reason: HOSPADM

## 2020-02-17 RX ORDER — METHYLERGONOVINE MALEATE 0.2 MG/ML
200 INJECTION INTRAVENOUS
Status: DISCONTINUED | OUTPATIENT
Start: 2020-02-17 | End: 2020-02-17

## 2020-02-17 RX ORDER — FENTANYL CITRATE 50 UG/ML
50-100 INJECTION, SOLUTION INTRAMUSCULAR; INTRAVENOUS
Status: DISCONTINUED | OUTPATIENT
Start: 2020-02-17 | End: 2020-02-17

## 2020-02-17 RX ADMIN — ACETAMINOPHEN 650 MG: 325 TABLET, FILM COATED ORAL at 17:31

## 2020-02-17 RX ADMIN — IBUPROFEN 800 MG: 800 TABLET, FILM COATED ORAL at 19:25

## 2020-02-17 RX ADMIN — Medication 340 ML/HR: at 01:32

## 2020-02-17 RX ADMIN — ONDANSETRON 4 MG: 2 INJECTION INTRAMUSCULAR; INTRAVENOUS at 00:45

## 2020-02-17 RX ADMIN — IBUPROFEN 800 MG: 800 TABLET, FILM COATED ORAL at 08:15

## 2020-02-17 RX ADMIN — SENNOSIDES AND DOCUSATE SODIUM 2 TABLET: 8.6; 5 TABLET ORAL at 08:15

## 2020-02-17 RX ADMIN — IBUPROFEN 800 MG: 800 TABLET, FILM COATED ORAL at 13:36

## 2020-02-17 RX ADMIN — ACETAMINOPHEN 650 MG: 325 TABLET, FILM COATED ORAL at 09:38

## 2020-02-17 RX ADMIN — ACETAMINOPHEN 650 MG: 325 TABLET, FILM COATED ORAL at 13:36

## 2020-02-17 RX ADMIN — SENNOSIDES AND DOCUSATE SODIUM 1 TABLET: 8.6; 5 TABLET ORAL at 19:25

## 2020-02-17 RX ADMIN — ACETAMINOPHEN 650 MG: 325 TABLET, FILM COATED ORAL at 21:30

## 2020-02-17 RX ADMIN — IBUPROFEN 800 MG: 800 TABLET, FILM COATED ORAL at 01:52

## 2020-02-17 RX ADMIN — ACETAMINOPHEN 650 MG: 325 TABLET, FILM COATED ORAL at 04:45

## 2020-02-17 NOTE — PROVIDER NOTIFICATION
02/16/20 1137   Provider Notification   Provider Name/Title AJair Muir CNM   Method of Notification Electronic Page   Notification Reason Patient Arrived   Pt just arrived with SROM at 2215. Feeling lots of pressure.

## 2020-02-17 NOTE — PLAN OF CARE
Data: VSS, postpartum assessments WNL. She is voiding without difficulty, up ad abril, passing gas, eating and drinking normally. Perineum appears to be healing well, lochia WNL, no s/s infection. Using tucks. She is independent with self and infant cares. Breastfeeding infant with minimal assistance. Taking tylenol and ibuprofen for pain. Reports good pain management.   Action: Education provided on discharge goals, self care, breast feeding log and breast feeding on demand.   Response: Pt is agreeable with her plan of care. Positive attachment behaviors observed with infant. Support people visiting throughout the day. Continue with plan of care.

## 2020-02-17 NOTE — PLAN OF CARE
Data: Joao العلي transferred to [facility] via wheelchair at 0342. Baby transferred via parent's arms.  Action: Receiving unit notified of transfer: Yes. Patient and family notified of room change. Report given to AMBER Herrera at 0252. Belongings sent to receiving unit. Accompanied by Registered Nurse. Oriented patient to surroundings. Call light within reach. ID bands double-checked with receiving RN.  Response: Patient tolerated transfer and is stable.
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
## 2020-02-17 NOTE — PROGRESS NOTES
Patient arrived to [facility] unit via wheelchair at 0340,with belongings, accompanied by spouse/ significant other, with infant in arms. Received report from Jake CLARK and checked bands. Unit and room orientation completd. Call light given; no concerns present at this time. Continue with plan of care.

## 2020-02-17 NOTE — PLAN OF CARE
Vaginal Delivery Note   of viable male with MICHELLE Gong & Ramila Muir CNM in attendance.  NICU/Nursery RN Jelena SCHAEFER RN present.  Infant with spontaneous cry, to mother's abdomen, dried and stimulated.  APGAR at 1 minute:  8 and APGAR at 5 minutes:  9.  Placenta delivered with out complication, no laceration, no repair, roger cares provided.  Mother and baby in stable condition.

## 2020-02-17 NOTE — PLAN OF CARE
VSS. Postpartum assessment  WNL- see flow sheet.  Light lochia rubra. Fundus firm, midline at U/U. Pain managed on tylenol and ibuprofen. Patient voiding spontaneously and ambulating independently. Patient breastfeeding infant with minimal assistance. Patient and significant other bonding well with baby . Will continue to monitor and provide support as needed.

## 2020-02-17 NOTE — PROGRESS NOTES
"PostPartum Note  Postpartum Day of Delivery  SIGNIFICANT PROBLEMS:  Patient Active Problem List    Diagnosis Date Noted     Labor and delivery, indication for care 2020     Priority: Medium      (normal spontaneous vaginal delivery) 2020     Priority: Medium     Supervision of other normal pregnancy, antepartum - [facility] CNM 2019     Priority: Medium     Needs repeat GC/Chlam.  Her urine spilled before it could be processed. - sent 19    Repeat growth 20 (hx of sga)  49%tile on        INTERVAL HISTORY:  /84   Pulse 73   Temp 98.3  F (36.8  C)   Resp 16   Ht 1.702 m (5' 7\")   Wt 93 kg (205 lb)   LMP 2019   Breastfeeding Unknown   BMI 32.11 kg/m    Pt stable, baby is rooming in.   Breast feeding status: initiated  Complications since 2 hours post delivery: None  Patient is tolerating activity well. Cramping is minimal and relieved by ibuprofen, lochia is decreasing and patient denies clots.  Perineal pain is minimal.  The perineum is intact.  Pt would like IV removed.    Postpartum hemoglobin   Hemoglobin   Date Value Ref Range Status   2020 12.0 11.7 - 15.7 g/dL Final     Blood type   Lab Results   Component Value Date    ABO O 2019       Lab Results   Component Value Date    RH Pos 2019     Rubella status   Lab Results   Component Value Date    RUQIGG 16 2019     Rubella: immune  History of depression: denies.     ASSESSMENT/PLAN:  Normal postpartum exam, stable postpartum day of delivery  Complications: none  Plan d/c home tomorrow. Home Visit Ordered- No  RTC 2 weeks for breastfeeding/mood check and 6 weeks for routine PP  Discussed hemoglobin check on PPD1 and possible need for IV. Due to patient's admission Hgb and QBL < 500 ml, unlikely she will need IV iron.  Discussed IV removal and possible need for replacement, pt opts for removal today.  Continue prenatal vitamins    Current Discharge Medication List      CONTINUE these " medications which have NOT CHANGED    Details   Prenatal Vit-Fe Fumarate-FA (PRENATAL 19) CHEW Take 1 tablet by mouth daily  Refills: 9      Cholecalciferol 4000 UNITS TABS Take 1 tablet by mouth daily    Associated Diagnoses: Supervision of other normal pregnancy, antepartum      order for DME Maternity Belt order  Qty: 1 each, Refills: 0    Associated Diagnoses: Supervision of other normal pregnancy, antepartum           MAURO Junior CNM

## 2020-02-17 NOTE — L&D DELIVERY NOTE
DELIVERY NOTE:  Brief Labor Course: Joao reports that her water broke at home at 1015 pm. She called triage and was told to come to the [facility].  Upon arrival she was amos q 2-3 minutes, breathing through the pain.  She verbally consented to SVE.    Delivery Course:  Pt became complete at 0111 and started pushing 0115. Delivered a vigorous baby boy who was immediately placed on mom's abdomen. Nuchal hand x1. IV pitocin started after delivery of infant per protocol. Umbilical cord was double clamped and cut by FOB after the cord stopped pulsing. Cord blood obtained. Placenta spontaneously delivered intact without difficulty via Schultze mechanism. Inspection of vagina and perineum revealed no laceration.  Fundus is firm and midline.  Mom and baby are stable.      IUP at 37 weeks gestation delivered on 2020.     delivery of a viable Male infant.  Weight : pending  Apgars of 8 at 1 minute and 9 at 5 minutes.  Labor was spontaneous.  Medications administered  in labor:  Pain Rx none; Antibiotics No; Other   Perineum: Intact  Placenta-mechanism: spontaneous, intact,  with a 3 vessel cord. IV oxytocin was given.  QBL was 175.  Anticipated Discharge Date: 20  Complications of pregnancy, labor and delivery: None  Birth attendants: MAXIMINO Mccullough SNM  I agree with the PFSH and ROS as completed by the student, except for changes made by me. The remainder of the encounter was performed by me and scribed by the student. The scribed note accurately reflects my personal services and decisions made by me.  Ramila Muir, ROBINSON, VELIAM, APRN    Delivery Summary    Joao العلي MRN# [id number]   Age: 35 year old YOB: 1985     ASSESSMENT & PLAN:        Labor Event Times    Labor onset date:  20 Onset time:  10:15 PM   Dilation complete date:  20 Complete time:   1:11 AM   Start pushing date/time:  2020 0115      Labor Events     labor?:   No   steroids:  None  Labor Type:  Spontaneous  Predominate monitoring during 1st stage:  continuous electronic fetal monitoring     Antibiotics received during labor?:  No     Rupture identifier:  Sac 1  Rupture date/time: 20   Rupture type:  Spontaneous rupture of membranes occuring during spontaneous labor or augmentation  Fluid color:  Clear  Fluid odor:  Normal     1:1 continuous labor support provided by?:  RN Labor partogram used?:  no      Delivery/Placenta Date and Time    Delivery Date:  20 Delivery Time:   1:28 AM   Placenta Date/Time:  2020  1:38 AM  Oxytocin given at the time of delivery:  after delivery of baby     Vaginal Counts     Initial count performed by 2 team members:   Two Team Members   mayito voss, ji       Needles Suture Wing Sponges Instruments   Initial counts 2  5    Added to count 0 0 0    Final counts 2 0 5    Placed during labor Accounted for at the end of labor   No NA   No NA   No NA    Final count performed by 2 team members:   Two Team Members   mayito voss rn      Final count correct?:  Yes     Apgars    Living status:  Living   1 Minute 5 Minute 10 Minute 15 Minute 20 Minute   Skin color: 1  1       Heart rate: 2  2       Reflex irritability: 2  2       Muscle tone: 2  2       Respiratory effort: 1  2       Total: 8  9       Apgars assigned by:  ROSEY KIM RN     Cord    Vessels:  3 Vessels Complications:  None   Cord Blood Disposition:  Lab Gases Sent?:  No      Emerson Resuscitation    Methods:  None  Emerson Care at Delivery:  , baby to mothers abdomen for skin to skin. Dried and stimulated with lusty cry. Delayed cord clamping done.      Skin to Skin and Feeding Plan    Skin to skin initiation date/time: 1841    Skin to skin with:  Mother  Skin to skin end date/time:     How do you plan to feed your baby:  Breastfeeding     Labor Events and Shoulder Dystocia    Fetal Tracing Prior  to Delivery:  Category 1     Delivery (Maternal) (Provider to Complete) ([id number])    Episiotomy:  None  Perineal lacerations:  None       Blood Loss  Mother: Joao العلي #[id number]   Start of Mother's Information    IO Blood Loss  02/16/20 2215 - 02/17/20 0200    Delivery QBL (mL) Hospital Encounter 175 mL    Total  175 mL         End of Mother's Information  Mother: Joao العلي #[id number]         Delivery - Provider to Complete ([id number])    Delivering clinician:  Ramila Muir APRN CNM  CNRADHA Care:  Exclusive CNM care in labor  Attempted Delivery Types (Choose all that apply):  Spontaneous Vaginal Delivery  Delivery Type (Choose the 1 that will go to the Birth History):  Vaginal, Spontaneous   Other personnel:   Provider Role   Loyda John Ann Louise Forster, APRN CNM Certified Nurse Midwife   Jake Junior RN Registered Nurse   Lakshmi Webb RN Registered Nurse         Placenta    Delayed Cord Clamping:  Done  Date/Time:  2/17/2020  1:38 AM  Removal:  Spontaneous  Disposition:  Hospital disposal     Anesthesia    Method:  Nitrous Oxide               Loyda John   I agree with the PFSH and ROS as completed by the student, except for changes made by me. The remainder of the encounter was performed by me and scribed by the student. The scribed note accurately reflects my personal services and decisions made by me.  Ramila Muir DNP, MAXIMINO, APRN

## 2020-02-18 VITALS
TEMPERATURE: 98.8 F | WEIGHT: 205 LBS | HEIGHT: 67 IN | DIASTOLIC BLOOD PRESSURE: 69 MMHG | HEART RATE: 74 BPM | SYSTOLIC BLOOD PRESSURE: 111 MMHG | BODY MASS INDEX: 32.18 KG/M2 | RESPIRATION RATE: 14 BRPM

## 2020-02-18 LAB
HGB BLD-MCNC: 11.8 G/DL (ref 11.7–15.7)
T PALLIDUM AB SER QL: NONREACTIVE

## 2020-02-18 PROCEDURE — 36415 COLL VENOUS BLD VENIPUNCTURE: CPT | Performed by: ADVANCED PRACTICE MIDWIFE

## 2020-02-18 PROCEDURE — 86780 TREPONEMA PALLIDUM: CPT | Performed by: ADVANCED PRACTICE MIDWIFE

## 2020-02-18 PROCEDURE — 25000132 ZZH RX MED GY IP 250 OP 250 PS 637: Performed by: ADVANCED PRACTICE MIDWIFE

## 2020-02-18 PROCEDURE — 85018 HEMOGLOBIN: CPT | Performed by: ADVANCED PRACTICE MIDWIFE

## 2020-02-18 RX ORDER — AMOXICILLIN 250 MG
1 CAPSULE ORAL DAILY
Qty: 100 TABLET | Refills: 0 | Status: SHIPPED | OUTPATIENT
Start: 2020-02-18 | End: 2021-07-13

## 2020-02-18 RX ORDER — IBUPROFEN 600 MG/1
600 TABLET, FILM COATED ORAL EVERY 6 HOURS PRN
Qty: 60 TABLET | Refills: 0 | Status: SHIPPED | OUTPATIENT
Start: 2020-02-18 | End: 2020-03-31

## 2020-02-18 RX ORDER — ACETAMINOPHEN 325 MG/1
650 TABLET ORAL EVERY 6 HOURS PRN
Qty: 100 TABLET | Refills: 0 | Status: SHIPPED | OUTPATIENT
Start: 2020-02-18 | End: 2020-03-31

## 2020-02-18 RX ADMIN — IBUPROFEN 800 MG: 800 TABLET, FILM COATED ORAL at 02:55

## 2020-02-18 RX ADMIN — IBUPROFEN 800 MG: 800 TABLET, FILM COATED ORAL at 09:29

## 2020-02-18 RX ADMIN — SENNOSIDES AND DOCUSATE SODIUM 2 TABLET: 8.6; 5 TABLET ORAL at 09:38

## 2020-02-18 RX ADMIN — ACETAMINOPHEN 650 MG: 325 TABLET, FILM COATED ORAL at 04:12

## 2020-02-18 NOTE — PLAN OF CARE
Data: Vital signs stable, postpartum assessments within normal limits.   Eating and drinking without nausea or vomiting.  Up ad abril, and voiding without difficulty. Passing gas/BM.  Feeding baby independently-  breast feeding.   Pain managed with ibuprofen tylenol.  Pt states she is comfortable.  Perineum appears to be healing well, no s/s infection.  Discharge outcomes on care plan met.   Action: Review of care plan, teaching, and discharge instructions done. Infant identification with ID bands done, verification with signature obtained.   Response: Patient states understanding and comfort with her discharge instructions and plan of care. All questions addressed. She will discharge home with her infant.

## 2020-02-18 NOTE — PLAN OF CARE
VSS and postpartum assessments WDL. Fundus firm, at U/1 and scant lochia. Up ad abril with steady gait.  Independent with cares.  Bonding well with infant.  Breastfeeding on cue independently. Pain managed with tylenol and ibuprofen. Partner present and supportive.  Will continue with postpartum cares and education per plan of care.

## 2020-02-18 NOTE — PLAN OF CARE
Data: VSS, postpartum assessments WNL. She is voiding without difficulty, up ad abril, passing gas, eating and drinking normally. Perineum appears to be healing well, lochia WNL, no s/s infection. She is independent with self and infant cares. Breastfeeding infant with no assistance. Taking ibuprofen and tylenol for pain and using the following non-pharmacologic methods: roger bottle, belly band tucks, lanolin, has hydrogels for breast pain. Reports good pain management.   Action: Education provided on breastfeeding positions, proper ergonomics for her body while feeding, adequate intake for her  Response: Pt is agreeable with her plan of care. Positive attachment behaviors observed with infant. Support person present. Anticipate discharge per care plan. Patient wishes to discharge this morning.

## 2020-02-26 ENCOUNTER — TELEPHONE (OUTPATIENT)
Dept: OBGYN | Facility: CLINIC | Age: 35
End: 2020-02-26

## 2020-02-26 RX ORDER — BREAST PUMP
1 EACH MISCELLANEOUS PRN
Qty: 1 EACH | Refills: 0 | Status: SHIPPED | OUTPATIENT
Start: 2020-02-26 | End: 2021-07-13

## 2020-02-26 NOTE — TELEPHONE ENCOUNTER
----- Message from Trish Landis sent at 2/24/2020 12:11 PM CST -----  Regarding: Breast Pump RX  Contact: [phone]  Pt requesting a RX for a breast pump to be sent to [facility]: [phone]. Please call pt with questions or concerns.    Thanks  Trish

## 2020-03-10 ENCOUNTER — HEALTH MAINTENANCE LETTER (OUTPATIENT)
Age: 35
End: 2020-03-10

## 2020-03-31 ENCOUNTER — VIRTUAL VISIT (OUTPATIENT)
Dept: OBGYN | Facility: CLINIC | Age: 35
End: 2020-03-31
Attending: ADVANCED PRACTICE MIDWIFE
Payer: COMMERCIAL

## 2020-03-31 PROBLEM — Z34.80 SUPERVISION OF OTHER NORMAL PREGNANCY, ANTEPARTUM: Status: RESOLVED | Noted: 2019-08-02 | Resolved: 2020-03-31

## 2020-03-31 RX ORDER — CHOLECALCIFEROL (VITAMIN D3) 50 MCG
TABLET ORAL
COMMUNITY
Start: 2020-02-18

## 2020-03-31 NOTE — PROGRESS NOTES
"Joao العلي is a 35 year old female who is being evaluated via a billable telephone visit.      The patient has been notified of following:     \"This telephone visit will be conducted via a call between you and your physician/provider. We have found that certain health care needs can be provided without the need for a physical exam.  This service lets us provide the care you need with a short phone conversation.  If a prescription is necessary we can send it directly to your pharmacy.  If lab work is needed we can place an order for that and you can then stop by our lab to have the test done at a later time.    If during the course of the call the physician/provider feels a telephone visit is not appropriate, you will not be charged for this service.\"     Patient has given verbal consent for Telephone visit? yes    Joao العلي complains of   Chief Complaint   Patient presents with     Postpartum Care     I have reviewed and updated the patient's Past Medical History, Social History, Family History and Medication List.    ALLERGIES  Patient has no known allergies.    Joao العلي is a 35 yr old female, , who presents for her postpartum visit today by telephone due to the coronavirus.    She delivered a baby boy at 37 weeks gestation on 2020 by  with nuchal hand, but no other complications.   Apgars of 8 at 1 minute and 9 at 5 minutes.  QBL: 175cc  Perineum: intact  Postpartum Hgb: 11.8  Passed 3 hr GTT in pregnancy.     She has no specific concerns or questions today and is feeling well overall.  Denies feeling any pain.   Current Medications: Vitamin D, Prenatal Vitamin, and Senna PRN    Sleep: Has been waking at night the last couple weeks due to her baby being fussy which she thinks is related to gas; she plans to call her pediatrician today; has been getting 3-4 hours of sleep at night; naps when she can during the day.      Mental health: States she experienced some " symptoms of anxiety and depression during the first week postpartum. Since then, her mental health has been much improved. Denies concerns today. She denies excessive worry, feeling anxious, trouble sleeping, feeling down, depressed, or hopeless. Has a 25 month old girl at home as well. States she is adjusting well to the new baby. States she feels her mental health is better now that she physically feels better and has gotten into a routine. She enjoys the sunshine each day.      Support:   is home with Joao and is her greatest support; he is primarily working from home, although goes into the office on occasion. Mother in law also lives in the area; has not had visitors due to COVID-19, however. Has been doing Facetime with family for social support.      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    Feeding: breastfeeding and pumping; baby is gaining weight and having many wet diapers; takes a Mothers Milk Tea supplement and has no concern for low milk supply. Denies signs/ symptoms of mastitis. Baby latches well.     GI/ : Taking Senna PRN, has been helping constipation. Denies any other urinary or bowel symptoms.     Vaginal Bleeding: lasted for ~3 weeks; got first period a couple weeks     Sexual Health: has not had intercourse since delivery; plans condoms for birth control    ROS: 10 point ROS neg other than the symptoms noted above in the HPI.     EXAM: Exam deferred; visit done by telephone encounter    ASSESSMENT:   Encounter Diagnosis   Name Primary?     Routine postpartum follow-up Yes      Normal postpartum exam after   Pregnancy was complicated by:  none.      PLAN:  Risks and benefits of prescribed medications discussed.  Medication instructions reviewed. May continue Senna PRN for constipation; continue prenatal vitamin and Vitamin D 3 while breastfeeding.   Caffeine reduction recommended  Contraception methods discussed. Patient plans to use  condoms.   Signs and symptoms of postpartum depression/anxiety discussed  Exercise encouraged  Next pap smear with HPV due in 2023.   Kegel exercises recommended  Educated on diastasis recti and how patient can do self-assessment for this  Reinforced COVID-19 precautions including: social distancing of at least 6 feet, avoiding gatherings of 10 persons or more, frequent hand hygiene, avoiding discretionary travel, avoiding touching of face, and cleaning and disinfecting frequently touched surfaces daily.  Encouraged household members to follow the same guidelines.   Follow up in 1 year.    Joao expressed understanding and agreement with the plan for care.  Phone call duration: 25 minutes    Heide Marquez, ROBINSON, APRN, WHNP

## 2020-10-29 NOTE — PROGRESS NOTES
33 year old,  , presents at 37 3/7 weeks in pregnancy complicated by IUGR for biophysical assessment.    Fetal Breathing Movements (FBM): Normal - 2  Gross Body Movements (GBM): Normal - 2  Fetal Tone (FT): Normal - 2  AFV: Pocket of amniotic fluid > or = to 2 cm x 2 cm - 2  Quantitative Amniotic Fluid Volume Total: 15.8 cm      BPP 8/8.  UAR = 2.20 Normal.   MCA Not done.  NST Not done.     Single fetus in cephalic presentation.  Placenta anterior and grade 2.    Continue twice weekly BPP with UAR in the setting of fetal growth restriction.     LAVERN Morgan MD       [Labia Majora] : normal [Labia Minora] : normal [Normal] : normal [Uterine Adnexae] : normal

## 2020-12-27 ENCOUNTER — HEALTH MAINTENANCE LETTER (OUTPATIENT)
Age: 35
End: 2020-12-27

## 2021-03-30 ENCOUNTER — OFFICE VISIT (OUTPATIENT)
Dept: OBGYN | Facility: CLINIC | Age: 36
End: 2021-03-30
Attending: ADVANCED PRACTICE MIDWIFE
Payer: COMMERCIAL

## 2021-03-30 VITALS
HEIGHT: 67 IN | SYSTOLIC BLOOD PRESSURE: 112 MMHG | HEART RATE: 72 BPM | BODY MASS INDEX: 29 KG/M2 | DIASTOLIC BLOOD PRESSURE: 78 MMHG | WEIGHT: 184.8 LBS

## 2021-03-30 DIAGNOSIS — Z30.09 ENCOUNTER FOR COUNSELING REGARDING CONTRACEPTION: Primary | ICD-10-CM

## 2021-03-30 PROCEDURE — G0463 HOSPITAL OUTPT CLINIC VISIT: HCPCS

## 2021-03-30 PROCEDURE — 99214 OFFICE O/P EST MOD 30 MIN: CPT | Performed by: ADVANCED PRACTICE MIDWIFE

## 2021-03-30 ASSESSMENT — ANXIETY QUESTIONNAIRES
1. FEELING NERVOUS, ANXIOUS, OR ON EDGE: NOT AT ALL
6. BECOMING EASILY ANNOYED OR IRRITABLE: NOT AT ALL
GAD7 TOTAL SCORE: 0
3. WORRYING TOO MUCH ABOUT DIFFERENT THINGS: NOT AT ALL
2. NOT BEING ABLE TO STOP OR CONTROL WORRYING: NOT AT ALL
5. BEING SO RESTLESS THAT IT IS HARD TO SIT STILL: NOT AT ALL
7. FEELING AFRAID AS IF SOMETHING AWFUL MIGHT HAPPEN: NOT AT ALL

## 2021-03-30 ASSESSMENT — PATIENT HEALTH QUESTIONNAIRE - PHQ9
5. POOR APPETITE OR OVEREATING: NOT AT ALL
SUM OF ALL RESPONSES TO PHQ QUESTIONS 1-9: 0

## 2021-03-30 ASSESSMENT — MIFFLIN-ST. JEOR: SCORE: 1565.88

## 2021-03-30 ASSESSMENT — PAIN SCALES - GENERAL: PAINLEVEL: NO PAIN (0)

## 2021-03-30 NOTE — NURSING NOTE
Chief Complaint   Patient presents with     Contraception     Pt here to discuss birth control options.

## 2021-03-30 NOTE — PROGRESS NOTES
"Chief Complaint:  Contraceptive Counseling    Subjective:  Patient is a 36 year old  who presents to discuss birth control options.      Current Birth Control:  None.  Has just stopped breastfeeding.  Is considering going back to school.  Desires future children, but not for a couple years. Reports regular periods q monthly, lasting 4-5 days.    Denies migraine, non smoker, no personal or family h/o DVT.  Denies any other changes in medical history since last pregnancy. Is currently sexually active with male partner    Objective:  Vitals:    21 1009   BP: 112/78   Pulse: 72   Weight: 83.8 kg (184 lb 12.8 oz)   Height: 1.702 m (5' 7\")     Assessment/Plan:  - Contraception Management: Reviewed all contraceptive options, including OCPs, Depo, patch, ring, implant and hormonal vs. Non-hormonal IUDs.  Reviewed side effects, benefits, effectiveness, etc.   - Pt given informational pamphlets for LARCs.  Would like to consider information before making decision.  - If patient desires OCPs, can eprescribe to desired pharmacy.   - RTO prn if IUD or implant desired. Encouraged 2 weeks of abstinence or back-up method prior to insertion.        Over 50% of the 30 minute visit was spent in face to face counseling as stated above. All patient's questions were discussed and answered. Pt agreed to plan of care.    MAURO Junior CNM  "

## 2021-03-30 NOTE — LETTER
"3/30/2021       RE: Joao العلي  [address]  [facility] [address]     Dear Colleague,    Thank you for referring your patient, Joao العلي, to the [facility] [place] [facility]. Please see a copy of my visit note below.    Chief Complaint:  Contraceptive Counseling    Subjective:  Patient is a 36 year old  who presents to discuss birth control options.      Current Birth Control:  None.  Has just stopped breastfeeding.  Is considering going back to school.  Desires future children, but not for a couple years. Reports regular periods q monthly, lasting 4-5 days.    Denies migraine, non smoker, no personal or family h/o DVT.  Denies any other changes in medical history since last pregnancy. Is currently sexually active with male partner    Objective:  Vitals:    21 1009   BP: 112/78   Pulse: 72   Weight: 83.8 kg (184 lb 12.8 oz)   Height: 1.702 m (5' 7\")     Assessment/Plan:  - Contraception Management: Reviewed all contraceptive options, including OCPs, Depo, patch, ring, implant and hormonal vs. Non-hormonal IUDs.  Reviewed side effects, benefits, effectiveness, etc.   - Pt given informational pamphlets for LARCs.  Would like to consider information before making decision.  - If patient desires OCPs, can eprescribe to desired pharmacy.   - RTO prn if IUD or implant desired. Encouraged 2 weeks of abstinence or back-up method prior to insertion.        Over 50% of the 30 minute visit was spent in face to face counseling as stated above. All patient's questions were discussed and answered. Pt agreed to plan of care.    MAURO Junior CNM    "

## 2021-03-31 ASSESSMENT — ANXIETY QUESTIONNAIRES: GAD7 TOTAL SCORE: 0

## 2021-04-24 ENCOUNTER — HEALTH MAINTENANCE LETTER (OUTPATIENT)
Age: 36
End: 2021-04-24

## 2021-04-26 DIAGNOSIS — Z30.011 OCP (ORAL CONTRACEPTIVE PILLS) INITIATION: Primary | ICD-10-CM

## 2021-04-26 RX ORDER — NORETHINDRONE ACETATE AND ETHINYL ESTRADIOL .02; 1 MG/1; MG/1
1 TABLET ORAL DAILY
Qty: 84 TABLET | Refills: 3 | Status: SHIPPED | OUTPATIENT
Start: 2021-04-26

## 2021-07-13 ENCOUNTER — OFFICE VISIT (OUTPATIENT)
Dept: URGENT CARE | Facility: URGENT CARE | Age: 36
End: 2021-07-13
Payer: COMMERCIAL

## 2021-07-13 VITALS — SYSTOLIC BLOOD PRESSURE: 110 MMHG | TEMPERATURE: 99.2 F | HEART RATE: 61 BPM | DIASTOLIC BLOOD PRESSURE: 76 MMHG

## 2021-07-13 DIAGNOSIS — H61.21 IMPACTED CERUMEN OF RIGHT EAR: Primary | ICD-10-CM

## 2021-07-13 DIAGNOSIS — J30.2 SEASONAL ALLERGIC RHINITIS, UNSPECIFIED TRIGGER: ICD-10-CM

## 2021-07-13 PROCEDURE — 99213 OFFICE O/P EST LOW 20 MIN: CPT | Mod: 25 | Performed by: PHYSICIAN ASSISTANT

## 2021-07-13 PROCEDURE — 69209 REMOVE IMPACTED EAR WAX UNI: CPT | Mod: RT | Performed by: PHYSICIAN ASSISTANT

## 2021-07-13 RX ORDER — CETIRIZINE HYDROCHLORIDE 10 MG/1
10 TABLET ORAL EVERY EVENING
Qty: 30 TABLET | Refills: 1 | Status: SHIPPED | OUTPATIENT
Start: 2021-07-13

## 2021-07-13 RX ORDER — FLUTICASONE PROPIONATE 50 MCG
1 SPRAY, SUSPENSION (ML) NASAL DAILY
Qty: 18.2 ML | Refills: 0 | Status: SHIPPED | OUTPATIENT
Start: 2021-07-13

## 2021-07-13 NOTE — PATIENT INSTRUCTIONS
(H61.21) Impacted cerumen of right ear  (primary encounter diagnosis)  Comment:   Plan: NV REMOVAL IMPACTED CERUMEN IRRIGATION/LVG         UNILAT            (J30.2) Seasonal allergic rhinitis, unspecified trigger  Comment:   Plan: fluticasone (FLONASE) 50 MCG/ACT nasal spray,         cetirizine (ZYRTEC) 10 MG tablet          Take allergy meds for minimum of 2 weeks.      Follow up with primary clinic should symptoms persist or worsen.
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
## 2021-09-05 ENCOUNTER — APPOINTMENT (OUTPATIENT)
Dept: GENERAL RADIOLOGY | Facility: CLINIC | Age: 36
End: 2021-09-05
Attending: EMERGENCY MEDICINE
Payer: COMMERCIAL

## 2021-09-05 ENCOUNTER — HOSPITAL ENCOUNTER (EMERGENCY)
Facility: CLINIC | Age: 36
Discharge: HOME | End: 2021-09-05
Attending: EMERGENCY MEDICINE | Admitting: EMERGENCY MEDICINE
Payer: COMMERCIAL

## 2021-09-05 VITALS
WEIGHT: 181 LBS | DIASTOLIC BLOOD PRESSURE: 83 MMHG | SYSTOLIC BLOOD PRESSURE: 125 MMHG | HEART RATE: 73 BPM | BODY MASS INDEX: 28.41 KG/M2 | HEIGHT: 67 IN | RESPIRATION RATE: 12 BRPM | TEMPERATURE: 97.8 F | OXYGEN SATURATION: 99 %

## 2021-09-05 DIAGNOSIS — R07.9 CHEST PAIN, UNSPECIFIED TYPE: ICD-10-CM

## 2021-09-05 DIAGNOSIS — K21.9 GASTROESOPHAGEAL REFLUX DISEASE, UNSPECIFIED WHETHER ESOPHAGITIS PRESENT: ICD-10-CM

## 2021-09-05 LAB
ANION GAP SERPL CALCULATED.3IONS-SCNC: 3 MMOL/L (ref 3–14)
ATRIAL RATE - MUSE: 78 BPM
BASOPHILS # BLD AUTO: 0.1 10E3/UL (ref 0–0.2)
BASOPHILS NFR BLD AUTO: 1 %
BUN SERPL-MCNC: 8 MG/DL (ref 7–30)
CALCIUM SERPL-MCNC: 8.6 MG/DL (ref 8.5–10.1)
CHLORIDE BLD-SCNC: 108 MMOL/L (ref 94–109)
CO2 SERPL-SCNC: 28 MMOL/L (ref 20–32)
CREAT SERPL-MCNC: 0.63 MG/DL (ref 0.52–1.04)
D DIMER PPP FEU-MCNC: 0.32 UG/ML FEU (ref 0–0.5)
DIASTOLIC BLOOD PRESSURE - MUSE: NORMAL MMHG
EOSINOPHIL # BLD AUTO: 0.3 10E3/UL (ref 0–0.7)
EOSINOPHIL NFR BLD AUTO: 4 %
ERYTHROCYTE [DISTWIDTH] IN BLOOD BY AUTOMATED COUNT: 12.6 % (ref 10–15)
GFR SERPL CREATININE-BSD FRML MDRD: >90 ML/MIN/1.73M2
GLUCOSE BLD-MCNC: 117 MG/DL (ref 70–99)
HCG SERPL QL: NEGATIVE
HCT VFR BLD AUTO: 38.7 % (ref 35–47)
HGB BLD-MCNC: 12.4 G/DL (ref 11.7–15.7)
IMM GRANULOCYTES # BLD: 0 10E3/UL
IMM GRANULOCYTES NFR BLD: 0 %
INTERPRETATION ECG - MUSE: NORMAL
LYMPHOCYTES # BLD AUTO: 1.8 10E3/UL (ref 0.8–5.3)
LYMPHOCYTES NFR BLD AUTO: 24 %
MCH RBC QN AUTO: 27.7 PG (ref 26.5–33)
MCHC RBC AUTO-ENTMCNC: 32 G/DL (ref 31.5–36.5)
MCV RBC AUTO: 86 FL (ref 78–100)
MONOCYTES # BLD AUTO: 0.5 10E3/UL (ref 0–1.3)
MONOCYTES NFR BLD AUTO: 7 %
NEUTROPHILS # BLD AUTO: 5 10E3/UL (ref 1.6–8.3)
NEUTROPHILS NFR BLD AUTO: 64 %
NRBC # BLD AUTO: 0 10E3/UL
NRBC BLD AUTO-RTO: 0 /100
P AXIS - MUSE: 77 DEGREES
PLATELET # BLD AUTO: 295 10E3/UL (ref 150–450)
POTASSIUM BLD-SCNC: 4.1 MMOL/L (ref 3.4–5.3)
PR INTERVAL - MUSE: 126 MS
QRS DURATION - MUSE: 84 MS
QT - MUSE: 382 MS
QTC - MUSE: 435 MS
R AXIS - MUSE: 75 DEGREES
RBC # BLD AUTO: 4.48 10E6/UL (ref 3.8–5.2)
SODIUM SERPL-SCNC: 139 MMOL/L (ref 133–144)
SYSTOLIC BLOOD PRESSURE - MUSE: NORMAL MMHG
T AXIS - MUSE: 55 DEGREES
TROPONIN I SERPL-MCNC: <0.015 UG/L (ref 0–0.04)
VENTRICULAR RATE- MUSE: 78 BPM
WBC # BLD AUTO: 7.6 10E3/UL (ref 4–11)

## 2021-09-05 PROCEDURE — 36415 COLL VENOUS BLD VENIPUNCTURE: CPT | Performed by: EMERGENCY MEDICINE

## 2021-09-05 PROCEDURE — 250N000009 HC RX 250: Performed by: EMERGENCY MEDICINE

## 2021-09-05 PROCEDURE — 71046 X-RAY EXAM CHEST 2 VIEWS: CPT

## 2021-09-05 PROCEDURE — 80048 BASIC METABOLIC PNL TOTAL CA: CPT | Performed by: EMERGENCY MEDICINE

## 2021-09-05 PROCEDURE — 93005 ELECTROCARDIOGRAM TRACING: CPT

## 2021-09-05 PROCEDURE — 84703 CHORIONIC GONADOTROPIN ASSAY: CPT | Performed by: EMERGENCY MEDICINE

## 2021-09-05 PROCEDURE — 99285 EMERGENCY DEPT VISIT HI MDM: CPT | Mod: 25

## 2021-09-05 PROCEDURE — 85025 COMPLETE CBC W/AUTO DIFF WBC: CPT | Performed by: EMERGENCY MEDICINE

## 2021-09-05 PROCEDURE — 85379 FIBRIN DEGRADATION QUANT: CPT | Performed by: EMERGENCY MEDICINE

## 2021-09-05 PROCEDURE — 84484 ASSAY OF TROPONIN QUANT: CPT | Performed by: EMERGENCY MEDICINE

## 2021-09-05 PROCEDURE — 250N000013 HC RX MED GY IP 250 OP 250 PS 637: Performed by: EMERGENCY MEDICINE

## 2021-09-05 RX ORDER — SUCRALFATE 1 G/1
1 TABLET ORAL 4 TIMES DAILY
Qty: 56 TABLET | Refills: 0 | Status: SHIPPED | OUTPATIENT
Start: 2021-09-05 | End: 2021-09-19

## 2021-09-05 RX ORDER — FAMOTIDINE 20 MG/1
20 TABLET, FILM COATED ORAL 2 TIMES DAILY
Qty: 28 TABLET | Refills: 0 | Status: SHIPPED | OUTPATIENT
Start: 2021-09-05 | End: 2021-09-19

## 2021-09-05 RX ADMIN — LIDOCAINE HYDROCHLORIDE 30 ML: 20 SOLUTION ORAL; TOPICAL at 05:18

## 2021-09-05 ASSESSMENT — MIFFLIN-ST. JEOR: SCORE: 1548.64

## 2021-09-05 ASSESSMENT — ENCOUNTER SYMPTOMS
FEVER: 0
DIZZINESS: 1
COUGH: 0
SHORTNESS OF BREATH: 1
HEADACHES: 1

## 2021-09-05 NOTE — ED TRIAGE NOTES
Patient presents with complaints of generalized chest pain that started about a week ago, one week after she received her Covid vaccine. Patient has difficulty explaining exactly where the pain is in her chest, describes it as pressure in her chest that is worse and somewhat sharp when lying down. Patient says that she had some shortness of breath a few hours ago while lying down but denied shortness of breath on arrival here.

## 2021-09-05 NOTE — ED PROVIDER NOTES
"  History   Chief Complaint:  Chest Pain     The history is provided by the patient.      Joao العلي is a 36 year old otherwise healthy female who presents with chest pain. Patient reports that she has been having intermittent chest pain for about a week. The chest pain feels like pressure at its baseline and when she lies down, it becomes a sharp pain. It is occasionally accompanied by shortness of breath, especially noticeable this morning, and is radiating into her back. She denies shortness of breath in the ED. She has never had these symptoms in the past. She notes she thought it was associated with her period, which she is on right now. She also notes that she received her first Moderna vaccine two weeks ago and she is wondering if her symptoms are associated with this vaccine. She denies loss of taste and smell, cough and fever. Of note, she recently stopped her birth control a month ago.     Review of Systems   Constitutional: Negative for fever.   Respiratory: Positive for shortness of breath. Negative for cough.    Cardiovascular: Positive for chest pain.   Neurological: Positive for dizziness and headaches.   All other systems reviewed and are negative.    Allergies:  No Known Allergies    Medications:  The patient is currently on no regular medications.    Past Medical History:    The patient denies past medical history.     Social History:  Presents alone    Physical Exam     Patient Vitals for the past 24 hrs:   BP Temp Pulse Resp SpO2 Height Weight   09/05/21 0627 125/83 -- 73 -- 99 % -- --   09/05/21 0530 138/86 -- 71 12 100 % -- --   09/05/21 0515 (!) 137/94 -- 71 16 100 % -- --   09/05/21 0510 -- -- 96 16 100 % -- --   09/05/21 0453 (!) 142/78 97.8  F (36.6  C) 76 16 100 % 1.702 m (5' 7\") 82.1 kg (181 lb)   09/05/21 0450 (!) 156/106 -- 85 (!) 35 99 % -- --       Physical Exam  General: Alert, interactive in mild distress  Head:  Scalp is atraumatic  Eyes:  The pupils are equal, round, and " reactive to light    EOM's intact    No scleral icterus  ENT:      Nose:  The external nose is normal  Ears:  External ears are normal  Mouth/Throat: The oropharynx is normal    Mucus membranes are moist       Neck:  Normal range of motion.      There is no rigidity.    Trachea is in the midline         CV:  Regular rate and rhythm    No murmur   Resp:  Breath sounds are clear bilaterally    Non-labored, no retractions or accessory muscle use      GI:  Abdomen is soft, no distension, no tenderness.       MS:  Normal strength in all 4 extremities  Skin:  Warm and dry, No rash or lesions noted.  Neuro: Strength 5/5 x4.      GCS: 15  Psych:  Awake. Alert.  Normal affect.      Appropriate interactions.    Emergency Department Course   ECG  ECG taken at 0441, ECG read at 0508  NSR   Rate 78 bpm. AK interval 126 ms. QRS duration 84 ms. QT/QTc 382/435 ms. P-R-T axes 77 75 55.     Imaging:   XR Chest 2 views:  PA and lateral views of the chest were obtained. Cardiomediastinal silhouette is within normal limits. No suspicious focal pulmonary opacities. No significant pleural effusion or pneumothorax, as per radiology.     Laboratory:  CBC: WBC 7.6, HGB 12.4,    BMP: Glucose 117 (H), o/w WNL (Creatinine: 0.63)    Troponin (Collected 0519): <0.015    D-dimer: 0.32    HCG Qualitative Blood: Negative    Emergency Department Course:    Reviewed:  I reviewed nursing notes, vitals, past medical history and care everywhere    Assessments:  0508 I obtained history and examined the patient as noted above.   0628 I rechecked the patient and explained findings. Resolution of her symptoms with a GI cocktail and patient is sitting upright.     Interventions:  0518 Maalox GI cocktail, 30 mL, Oral    Disposition:  The patient was discharged to home.       HEART Score  Background  Calculates the overall risk of adverse event in patient's presenting with chest pain.  Based on 5 criteria (each assigned 0-2 points) including  suspiciousness of history, EKG, age, risk factors and troponin.    Data  36 year old female  does not have any active problems on file.   reports that she has never smoked. She has never used smokeless tobacco.  family history includes No Known Problems in her father and mother.  No results found for: TROPI  Criteria   0-2 points for each of 5 items (maximum of 10 points):  Score 0- History slightly suspicious for coronary syndrome  Score 0- EKG Normal  Score 0- Age <45 years old  Score 0- No risk factors for atherosclerotic disease  Score 0- Within normal limits for troponin levels  Interpretation  Risk of adverse outcome  Heart Score: 0  Total Score 0-3- Adverse Outcome Risk 2.5% - Supports early discharge with appropriate follow-up          Impression & Plan   Medical Decision Making:  Following presentation history and physical examination were performed, the above work-up was undertaken.  A broad differential including acute coronary syndrome, aortic dissection, pulmonary embolism, pneumonia or pneumothorax were all considered.  With a nonischemic EKG and negative troponin despite several days of symptoms I doubt acute coronary syndrome.  With a negative D-dimer and no risk factors I doubt pulmonary embolism.  Findings are not consistent with aortic dissection.  Chest radiograph demonstrates no signs of pneumonia or pneumothorax.  Her abdominal examination is benign.  Her symptoms are worse at night when she is supine and she does have a burning sensation, she had significant improvement of her symptoms with GI cocktail and I favor a GI etiology.  I placed her on Pepcid and Carafate and have recommended follow-up with a gastroenterologist.  She will return here if new symptoms develop.      Diagnosis:    ICD-10-CM    1. Gastroesophageal reflux disease, unspecified whether esophagitis present  K21.9    2. Chest pain, unspecified type  R07.9        Discharge Medications:  New Prescriptions    FAMOTIDINE (PEPCID)  20 MG TABLET    Take 1 tablet (20 mg) by mouth 2 times daily for 14 days    SUCRALFATE (CARAFATE) 1 GM TABLET    Take 1 tablet (1 g) by mouth 4 times daily for 14 days       Scribe Disclosure:  I, Elizabeth Cerna, am serving as a scribe at 5:05 AM on 9/5/2021 to document services personally performed by Chet Fernandez MD based on my observations and the provider's statements to me.            Chet Fernandez MD  09/05/21 0648

## 2021-10-09 ENCOUNTER — HEALTH MAINTENANCE LETTER (OUTPATIENT)
Age: 36
End: 2021-10-09

## 2022-02-17 PROBLEM — Z34.80 SUPERVISION OF OTHER NORMAL PREGNANCY, ANTEPARTUM: Status: RESOLVED | Noted: 2017-06-13 | Resolved: 2018-02-28

## 2022-02-17 PROBLEM — O26.843 FUNDAL HEIGHT LOW FOR DATES IN THIRD TRIMESTER: Status: RESOLVED | Noted: 2017-12-21 | Resolved: 2018-02-28

## 2022-05-16 ENCOUNTER — HEALTH MAINTENANCE LETTER (OUTPATIENT)
Age: 37
End: 2022-05-16

## 2022-09-11 ENCOUNTER — HEALTH MAINTENANCE LETTER (OUTPATIENT)
Age: 37
End: 2022-09-11

## 2023-06-03 ENCOUNTER — HEALTH MAINTENANCE LETTER (OUTPATIENT)
Age: 38
End: 2023-06-03

## 2024-02-05 ENCOUNTER — OFFICE VISIT (OUTPATIENT)
Dept: URGENT CARE | Facility: URGENT CARE | Age: 39
End: 2024-02-05
Payer: COMMERCIAL

## 2024-02-05 VITALS
TEMPERATURE: 98.7 F | SYSTOLIC BLOOD PRESSURE: 122 MMHG | OXYGEN SATURATION: 99 % | DIASTOLIC BLOOD PRESSURE: 80 MMHG | HEART RATE: 86 BPM | RESPIRATION RATE: 16 BRPM

## 2024-02-05 DIAGNOSIS — R20.2 PARESTHESIAS: ICD-10-CM

## 2024-02-05 DIAGNOSIS — F41.9 ANXIETY: ICD-10-CM

## 2024-02-05 DIAGNOSIS — R31.9 URINARY TRACT INFECTION WITH HEMATURIA, SITE UNSPECIFIED: ICD-10-CM

## 2024-02-05 DIAGNOSIS — K21.00 GASTROESOPHAGEAL REFLUX DISEASE WITH ESOPHAGITIS WITHOUT HEMORRHAGE: Primary | ICD-10-CM

## 2024-02-05 DIAGNOSIS — N39.0 URINARY TRACT INFECTION WITH HEMATURIA, SITE UNSPECIFIED: ICD-10-CM

## 2024-02-05 DIAGNOSIS — G62.9 PERIPHERAL POLYNEUROPATHY: ICD-10-CM

## 2024-02-05 DIAGNOSIS — G47.00 INSOMNIA, UNSPECIFIED TYPE: ICD-10-CM

## 2024-02-05 DIAGNOSIS — I83.92 VARICOSE VEINS OF LEFT LOWER EXTREMITY, UNSPECIFIED WHETHER COMPLICATED: ICD-10-CM

## 2024-02-05 LAB
ALBUMIN SERPL-MCNC: 4 G/DL (ref 3.4–5)
ALBUMIN UR-MCNC: NEGATIVE MG/DL
ALP SERPL-CCNC: 72 U/L (ref 40–150)
ALT SERPL W P-5'-P-CCNC: 17 U/L (ref 0–50)
ANION GAP SERPL CALCULATED.3IONS-SCNC: 4 MMOL/L (ref 3–14)
APPEARANCE UR: CLEAR
AST SERPL W P-5'-P-CCNC: 33 U/L (ref 0–45)
BACTERIA #/AREA URNS HPF: ABNORMAL /HPF
BILIRUB SERPL-MCNC: 0.9 MG/DL (ref 0.2–1.3)
BILIRUB UR QL STRIP: NEGATIVE
BUN SERPL-MCNC: 8 MG/DL (ref 7–30)
CALCIUM SERPL-MCNC: 9.1 MG/DL (ref 8.5–10.1)
CHLORIDE BLD-SCNC: 105 MMOL/L (ref 94–109)
CO2 SERPL-SCNC: 28 MMOL/L (ref 20–32)
COLOR UR AUTO: YELLOW
CREAT SERPL-MCNC: 0.5 MG/DL (ref 0.52–1.04)
D DIMER PPP FEU-MCNC: 0.32 UG/ML FEU (ref 0–0.5)
EGFRCR SERPLBLD CKD-EPI 2021: >90 ML/MIN/1.73M2
ERYTHROCYTE [DISTWIDTH] IN BLOOD BY AUTOMATED COUNT: 13.3 % (ref 10–15)
ERYTHROCYTE [SEDIMENTATION RATE] IN BLOOD BY WESTERGREN METHOD: 33 MM/HR (ref 0–20)
GLUCOSE BLD-MCNC: 102 MG/DL (ref 70–99)
GLUCOSE UR STRIP-MCNC: NEGATIVE MG/DL
HBA1C MFR BLD: 5.9 % (ref 0–5.6)
HCG UR QL: NEGATIVE
HCT VFR BLD AUTO: 37.5 % (ref 35–47)
HGB BLD-MCNC: 12.4 G/DL (ref 11.7–15.7)
HGB UR QL STRIP: ABNORMAL
KETONES UR STRIP-MCNC: ABNORMAL MG/DL
LEUKOCYTE ESTERASE UR QL STRIP: NEGATIVE
MCH RBC QN AUTO: 27.4 PG (ref 26.5–33)
MCHC RBC AUTO-ENTMCNC: 33.1 G/DL (ref 31.5–36.5)
MCV RBC AUTO: 83 FL (ref 78–100)
NITRATE UR QL: NEGATIVE
PH UR STRIP: 6 [PH] (ref 5–7)
PLATELET # BLD AUTO: 306 10E3/UL (ref 150–450)
POTASSIUM BLD-SCNC: 3.8 MMOL/L (ref 3.4–5.3)
PROT SERPL-MCNC: 7.9 G/DL (ref 6.8–8.8)
RBC # BLD AUTO: 4.53 10E6/UL (ref 3.8–5.2)
RBC #/AREA URNS AUTO: ABNORMAL /HPF
SODIUM SERPL-SCNC: 137 MMOL/L (ref 135–145)
SP GR UR STRIP: 1.01 (ref 1–1.03)
SQUAMOUS #/AREA URNS AUTO: ABNORMAL /LPF
UROBILINOGEN UR STRIP-ACNC: 0.2 E.U./DL
WBC # BLD AUTO: 11.9 10E3/UL (ref 4–11)
WBC #/AREA URNS AUTO: ABNORMAL /HPF

## 2024-02-05 PROCEDURE — 81025 URINE PREGNANCY TEST: CPT | Performed by: PHYSICIAN ASSISTANT

## 2024-02-05 PROCEDURE — 80053 COMPREHEN METABOLIC PANEL: CPT | Performed by: PHYSICIAN ASSISTANT

## 2024-02-05 PROCEDURE — 99214 OFFICE O/P EST MOD 30 MIN: CPT | Performed by: PHYSICIAN ASSISTANT

## 2024-02-05 PROCEDURE — 85652 RBC SED RATE AUTOMATED: CPT | Performed by: PHYSICIAN ASSISTANT

## 2024-02-05 PROCEDURE — 87086 URINE CULTURE/COLONY COUNT: CPT | Performed by: PHYSICIAN ASSISTANT

## 2024-02-05 PROCEDURE — 83036 HEMOGLOBIN GLYCOSYLATED A1C: CPT | Performed by: PHYSICIAN ASSISTANT

## 2024-02-05 PROCEDURE — 84443 ASSAY THYROID STIM HORMONE: CPT | Performed by: PHYSICIAN ASSISTANT

## 2024-02-05 PROCEDURE — 36415 COLL VENOUS BLD VENIPUNCTURE: CPT | Performed by: PHYSICIAN ASSISTANT

## 2024-02-05 PROCEDURE — 85379 FIBRIN DEGRADATION QUANT: CPT | Performed by: PHYSICIAN ASSISTANT

## 2024-02-05 PROCEDURE — 85027 COMPLETE CBC AUTOMATED: CPT | Performed by: PHYSICIAN ASSISTANT

## 2024-02-05 PROCEDURE — 81001 URINALYSIS AUTO W/SCOPE: CPT | Performed by: PHYSICIAN ASSISTANT

## 2024-02-05 RX ORDER — CEPHALEXIN 500 MG/1
500 CAPSULE ORAL 3 TIMES DAILY
Qty: 15 CAPSULE | Refills: 0 | Status: SHIPPED | OUTPATIENT
Start: 2024-02-05 | End: 2024-02-10

## 2024-02-05 RX ORDER — HYDROXYZINE HYDROCHLORIDE 25 MG/1
25 TABLET, FILM COATED ORAL
Qty: 30 TABLET | Refills: 1 | Status: SHIPPED | OUTPATIENT
Start: 2024-02-05

## 2024-02-05 RX ORDER — OMEPRAZOLE 40 MG/1
40 CAPSULE, DELAYED RELEASE ORAL DAILY
Qty: 90 CAPSULE | Refills: 0 | Status: SHIPPED | OUTPATIENT
Start: 2024-02-05

## 2024-02-06 ENCOUNTER — MYC MEDICAL ADVICE (OUTPATIENT)
Dept: URGENT CARE | Facility: URGENT CARE | Age: 39
End: 2024-02-06
Payer: COMMERCIAL

## 2024-02-06 LAB — TSH SERPL DL<=0.005 MIU/L-ACNC: 1.05 UIU/ML (ref 0.3–4.2)

## 2024-02-06 NOTE — PROGRESS NOTES
Assessment & Plan     Gastroesophageal reflux disease with esophagitis without hemorrhage    Gastroesophageal reflux disease (GERD) is the backward flow of stomach acid into the esophagus. The esophagus is the tube that leads from your throat to your stomach. A one-way valve prevents the stomach acid from backing up into this tube. But when you have GERD, this valve does not close tightly enough. This can also cause pain and swelling in your esophagus. (This is called esophagitis.)  If you have mild GERD symptoms including heartburn, you may be able to control the problem with antacids or over-the-counter medicine. You can also make lifestyle changes to help reduce your symptoms. These include changing your diet and eating habits, such as not eating late at night and losing weight.      - omeprazole (PRILOSEC) 40 MG DR capsule  Dispense: 90 capsule; Refill: 0    Paresthesias    CBC neg for anemia  CMP neg for elevated blood sugars  A1c is borderline, advised to have follow up with PCP for recheck   ESR is elevated  UA is possibly POS,  Urine culture pending      Peripheral polyneuropathy    TSH pending  A1c slightly abnormal   Follow up with PCP    Varicose veins of left lower extremity, unspecified whether complicated    D-dimer neg for DVT  - D dimer quantitative    Insomnia, unspecified type    - hydrOXYzine HCl (ATARAX) 25 MG tablet  Dispense: 30 tablet; Refill: 1    Anxiety    - hydrOXYzine HCl (ATARAX) 25 MG tablet  Dispense: 30 tablet; Refill: 1    Urinary tract infection with hematuria, site unspecified    Possible UTI  Due to having symptoms will use keflex  Awaiting urine culture  - cephALEXin (KEFLEX) 500 MG capsule  Dispense: 15 capsule; Refill: 0      Review of external notes as documented elsewhere in note    Follow up with PCP    No follow-ups on file.    Subjective   Josedi is a 39 year old, presenting for the following health issues:  Tingling (Tingling in body-Pt is wondering if it could be  nerves-pins and needles in arms and legs-crinkling in ears as well-took claritin )    HPI       Review of Systems  Constitutional, neuro, ENT, endocrine, pulmonary, cardiac, gastrointestinal, genitourinary, musculoskeletal, integument and psychiatric systems are negative, except as otherwise noted.      Objective    /80   Pulse 86   Temp 98.7  F (37.1  C) (Tympanic)   Resp 16   SpO2 99%   Breastfeeding No   There is no height or weight on file to calculate BMI.  Physical Exam   GENERAL: alert and no distress  EYES: Eyes grossly normal to inspection, PERRL and conjunctivae and sclerae normal  HENT: ear canals and TM's normal, nose and mouth without ulcers or lesions  NECK: no adenopathy, no asymmetry, masses, or scars  RESP: lungs clear to auscultation - no rales, rhonchi or wheezes  CV: regular rate and rhythm, normal S1 S2, no S3 or S4, no murmur, click or rub, no peripheral edema  ABDOMEN: soft, nontender, no hepatosplenomegaly, no masses and bowel sounds normal  MS: no gross musculoskeletal defects noted, no edema  SKIN: no suspicious lesions or rashes  NEURO: Normal strength and tone, mentation intact and speech normal  PSYCH: mentation appears normal, affect normal/bright            Signed Electronically by: Landon Sanchez, [facility], BHAVIN  Results for orders placed or performed in visit on 02/05/24   D dimer quantitative     Status: Normal   Result Value Ref Range    D-Dimer Quantitative 0.32 0.00 - 0.50 ug/mL FEU    Narrative    This D-dimer assay is intended for use in conjunction with a clinical pretest probability assessment model to exclude pulmonary embolism (PE) and deep venous thrombosis (DVT) in outpatients suspected of PE or DVT. The cut-off value is 0.50 ug/mL FEU.   UA with Microscopic reflex to Culture     Status: Abnormal    Specimen: Urine, Clean Catch   Result Value Ref Range    Color Urine Yellow Colorless, Straw, Light Yellow, Yellow    Appearance Urine Clear Clear    Glucose Urine  Negative Negative mg/dL    Bilirubin Urine Negative Negative    Ketones Urine Trace (A) Negative mg/dL    Specific Gravity Urine 1.010 1.003 - 1.035    Blood Urine Small (A) Negative    pH Urine 6.0 5.0 - 7.0    Protein Albumin Urine Negative Negative mg/dL    Urobilinogen Urine 0.2 0.2, 1.0 E.U./dL    Nitrite Urine Negative Negative    Leukocyte Esterase Urine Negative Negative   HCG Qual, Urine ([id number])     Status: Normal   Result Value Ref Range    hCG Urine Qualitative Negative Negative   CBC with platelets     Status: Abnormal   Result Value Ref Range    WBC Count 11.9 (H) 4.0 - 11.0 10e3/uL    RBC Count 4.53 3.80 - 5.20 10e6/uL    Hemoglobin 12.4 11.7 - 15.7 g/dL    Hematocrit 37.5 35.0 - 47.0 %    MCV 83 78 - 100 fL    MCH 27.4 26.5 - 33.0 pg    MCHC 33.1 31.5 - 36.5 g/dL    RDW 13.3 10.0 - 15.0 %    Platelet Count 306 150 - 450 10e3/uL   Comprehensive metabolic panel (BMP + Alb, Alk Phos, ALT, AST, Total. Bili, TP)     Status: Abnormal   Result Value Ref Range    Sodium 137 135 - 145 mmol/L    Potassium 3.8 3.4 - 5.3 mmol/L    Chloride 105 94 - 109 mmol/L    Carbon Dioxide (CO2) 28 20 - 32 mmol/L    Anion Gap 4 3 - 14 mmol/L    Urea Nitrogen 8 7 - 30 mg/dL    Creatinine 0.50 (L) 0.52 - 1.04 mg/dL    GFR Estimate >90 >60 mL/min/1.73m2    Calcium 9.1 8.5 - 10.1 mg/dL    Glucose 102 (H) 70 - 99 mg/dL    Alkaline Phosphatase 72 40 - 150 U/L    AST 33 0 - 45 U/L    ALT 17 0 - 50 U/L    Protein Total 7.9 6.8 - 8.8 g/dL    Albumin 4.0 3.4 - 5.0 g/dL    Bilirubin Total 0.9 0.2 - 1.3 mg/dL   ESR: Erythrocyte sedimentation rate     Status: Abnormal   Result Value Ref Range    Erythrocyte Sedimentation Rate 33 (H) 0 - 20 mm/hr   UA Microscopic with Reflex to Culture     Status: Abnormal   Result Value Ref Range    Bacteria Urine Few (A) None Seen /HPF    RBC Urine 2-5 (A) 0-2 /HPF /HPF    WBC Urine None Seen 0-5 /HPF /HPF    Squamous Epithelials Urine Few (A) None Seen /LPF    Narrative    Urine Culture not indicated    Hemoglobin A1c     Status: Abnormal   Result Value Ref Range    Hemoglobin A1C 5.9 (H) 0.0 - 5.6 %

## 2024-02-07 LAB — BACTERIA UR CULT: NORMAL

## 2024-02-08 ENCOUNTER — HOSPITAL ENCOUNTER (EMERGENCY)
Facility: CLINIC | Age: 39
Discharge: HOME | End: 2024-02-08
Attending: EMERGENCY MEDICINE | Admitting: EMERGENCY MEDICINE
Payer: COMMERCIAL

## 2024-02-08 VITALS
HEIGHT: 67 IN | OXYGEN SATURATION: 100 % | RESPIRATION RATE: 18 BRPM | BODY MASS INDEX: 28.25 KG/M2 | DIASTOLIC BLOOD PRESSURE: 84 MMHG | WEIGHT: 180 LBS | TEMPERATURE: 98 F | HEART RATE: 109 BPM | SYSTOLIC BLOOD PRESSURE: 133 MMHG

## 2024-02-08 DIAGNOSIS — G62.9 PERIPHERAL POLYNEUROPATHY: ICD-10-CM

## 2024-02-08 LAB
ANION GAP SERPL CALCULATED.3IONS-SCNC: 16 MMOL/L (ref 7–15)
BASOPHILS # BLD AUTO: 0 10E3/UL (ref 0–0.2)
BASOPHILS NFR BLD AUTO: 0 %
BUN SERPL-MCNC: 10.3 MG/DL (ref 6–20)
CALCIUM SERPL-MCNC: 9.2 MG/DL (ref 8.6–10)
CHLORIDE SERPL-SCNC: 100 MMOL/L (ref 98–107)
CREAT SERPL-MCNC: 0.53 MG/DL (ref 0.51–0.95)
CRP SERPL-MCNC: 5.43 MG/L
DEPRECATED HCO3 PLAS-SCNC: 22 MMOL/L (ref 22–29)
EGFRCR SERPLBLD CKD-EPI 2021: >90 ML/MIN/1.73M2
EOSINOPHIL # BLD AUTO: 0.1 10E3/UL (ref 0–0.7)
EOSINOPHIL NFR BLD AUTO: 1 %
ERYTHROCYTE [DISTWIDTH] IN BLOOD BY AUTOMATED COUNT: 13.2 % (ref 10–15)
ERYTHROCYTE [SEDIMENTATION RATE] IN BLOOD BY WESTERGREN METHOD: 23 MM/HR (ref 0–20)
GLUCOSE SERPL-MCNC: 72 MG/DL (ref 70–99)
HCT VFR BLD AUTO: 36.2 % (ref 35–47)
HGB BLD-MCNC: 12 G/DL (ref 11.7–15.7)
HOLD SPECIMEN: NORMAL
IMM GRANULOCYTES # BLD: 0 10E3/UL
IMM GRANULOCYTES NFR BLD: 0 %
LYMPHOCYTES # BLD AUTO: 1.5 10E3/UL (ref 0.8–5.3)
LYMPHOCYTES NFR BLD AUTO: 14 %
MAGNESIUM SERPL-MCNC: 1.8 MG/DL (ref 1.7–2.3)
MCH RBC QN AUTO: 27.6 PG (ref 26.5–33)
MCHC RBC AUTO-ENTMCNC: 33.1 G/DL (ref 31.5–36.5)
MCV RBC AUTO: 83 FL (ref 78–100)
MONOCYTES # BLD AUTO: 0.6 10E3/UL (ref 0–1.3)
MONOCYTES NFR BLD AUTO: 5 %
NEUTROPHILS # BLD AUTO: 9 10E3/UL (ref 1.6–8.3)
NEUTROPHILS NFR BLD AUTO: 80 %
NRBC # BLD AUTO: 0 10E3/UL
NRBC BLD AUTO-RTO: 0 /100
PLATELET # BLD AUTO: 290 10E3/UL (ref 150–450)
POTASSIUM SERPL-SCNC: 3.5 MMOL/L (ref 3.4–5.3)
RBC # BLD AUTO: 4.34 10E6/UL (ref 3.8–5.2)
SODIUM SERPL-SCNC: 138 MMOL/L (ref 135–145)
TSH SERPL DL<=0.005 MIU/L-ACNC: 0.54 UIU/ML (ref 0.3–4.2)
VIT B12 SERPL-MCNC: 639 PG/ML (ref 232–1245)
WBC # BLD AUTO: 11.2 10E3/UL (ref 4–11)

## 2024-02-08 PROCEDURE — 85025 COMPLETE CBC W/AUTO DIFF WBC: CPT | Performed by: EMERGENCY MEDICINE

## 2024-02-08 PROCEDURE — 86140 C-REACTIVE PROTEIN: CPT | Performed by: EMERGENCY MEDICINE

## 2024-02-08 PROCEDURE — 85652 RBC SED RATE AUTOMATED: CPT | Performed by: EMERGENCY MEDICINE

## 2024-02-08 PROCEDURE — 83735 ASSAY OF MAGNESIUM: CPT | Performed by: EMERGENCY MEDICINE

## 2024-02-08 PROCEDURE — 84443 ASSAY THYROID STIM HORMONE: CPT | Performed by: EMERGENCY MEDICINE

## 2024-02-08 PROCEDURE — 99283 EMERGENCY DEPT VISIT LOW MDM: CPT

## 2024-02-08 PROCEDURE — 80048 BASIC METABOLIC PNL TOTAL CA: CPT | Performed by: EMERGENCY MEDICINE

## 2024-02-08 PROCEDURE — 82607 VITAMIN B-12: CPT | Performed by: EMERGENCY MEDICINE

## 2024-02-08 PROCEDURE — 36415 COLL VENOUS BLD VENIPUNCTURE: CPT | Performed by: EMERGENCY MEDICINE

## 2024-02-08 ASSESSMENT — ACTIVITIES OF DAILY LIVING (ADL): ADLS_ACUITY_SCORE: 37

## 2024-02-08 NOTE — ED TRIAGE NOTES
Pt c/o tingling over whole body since Sunday went to  Monday and was dx with UTI. PT stopped taking abx and body still tingling     Triage Assessment (Adult)       Row Name 02/08/24 1056          Triage Assessment    Airway WDL WDL        Respiratory WDL    Respiratory WDL WDL        Skin Circulation/Temperature WDL    Skin Circulation/Temperature WDL WDL        Cardiac WDL    Cardiac WDL WDL        Peripheral/Neurovascular WDL    Peripheral Neurovascular WDL WDL        Cognitive/Neuro/Behavioral WDL    Cognitive/Neuro/Behavioral WDL WDL
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
## 2024-03-28 ENCOUNTER — TRANSCRIBE ORDERS (OUTPATIENT)
Dept: OTHER | Age: 39
End: 2024-03-28

## 2024-03-28 DIAGNOSIS — G44.52 NEW PERSISTENT DAILY HEADACHE: Primary | ICD-10-CM

## 2024-04-15 ENCOUNTER — THERAPY VISIT (OUTPATIENT)
Dept: PHYSICAL THERAPY | Facility: CLINIC | Age: 39
End: 2024-04-15
Payer: COMMERCIAL

## 2024-04-15 DIAGNOSIS — G44.209 TENSION HEADACHE: Primary | ICD-10-CM

## 2024-04-15 PROCEDURE — 97161 PT EVAL LOW COMPLEX 20 MIN: CPT | Mod: GP | Performed by: PHYSICAL THERAPIST

## 2024-04-15 PROCEDURE — 97110 THERAPEUTIC EXERCISES: CPT | Mod: GP | Performed by: PHYSICAL THERAPIST
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
## 2024-04-26 ENCOUNTER — THERAPY VISIT (OUTPATIENT)
Dept: PHYSICAL THERAPY | Facility: CLINIC | Age: 39
End: 2024-04-26
Payer: COMMERCIAL

## 2024-04-26 DIAGNOSIS — G44.209 TENSION HEADACHE: Primary | ICD-10-CM

## 2024-04-26 PROCEDURE — 97110 THERAPEUTIC EXERCISES: CPT | Mod: GP | Performed by: PHYSICAL THERAPIST

## 2024-04-26 PROCEDURE — 97140 MANUAL THERAPY 1/> REGIONS: CPT | Mod: GP | Performed by: PHYSICAL THERAPIST

## 2024-05-03 ENCOUNTER — THERAPY VISIT (OUTPATIENT)
Dept: PHYSICAL THERAPY | Facility: CLINIC | Age: 39
End: 2024-05-03
Payer: COMMERCIAL

## 2024-05-03 DIAGNOSIS — G44.209 TENSION HEADACHE: Primary | ICD-10-CM

## 2024-05-03 PROCEDURE — 97140 MANUAL THERAPY 1/> REGIONS: CPT | Mod: GP | Performed by: PHYSICAL THERAPIST

## 2024-05-10 ENCOUNTER — THERAPY VISIT (OUTPATIENT)
Dept: PHYSICAL THERAPY | Facility: CLINIC | Age: 39
End: 2024-05-10
Payer: COMMERCIAL

## 2024-05-10 DIAGNOSIS — G44.209 TENSION HEADACHE: Primary | ICD-10-CM

## 2024-05-10 PROCEDURE — 97140 MANUAL THERAPY 1/> REGIONS: CPT | Mod: GP | Performed by: PHYSICAL THERAPIST

## 2024-05-10 PROCEDURE — 97110 THERAPEUTIC EXERCISES: CPT | Mod: GP | Performed by: PHYSICAL THERAPIST

## 2024-05-31 ENCOUNTER — THERAPY VISIT (OUTPATIENT)
Dept: PHYSICAL THERAPY | Facility: CLINIC | Age: 39
End: 2024-05-31
Payer: COMMERCIAL

## 2024-05-31 DIAGNOSIS — G44.209 TENSION HEADACHE: Primary | ICD-10-CM

## 2024-05-31 PROCEDURE — 97140 MANUAL THERAPY 1/> REGIONS: CPT | Mod: GP | Performed by: PHYSICAL THERAPIST

## 2024-05-31 NOTE — PROGRESS NOTES
PLAN  Pt completed 5 sessions of PT. Pt reported resolution of HA's since initiation of medication (nortriptyline). No further PT currently scheduled. Pt will continue to monitor HA status for now. Will contact clinic if needed.    Beginning/End Dates of Progress Note Reporting Period:   4-15-24 to 05/31/2024    Referring Provider:  Mariposa Ling

## 2024-06-03 PROBLEM — G44.209 TENSION HEADACHE: Status: RESOLVED | Noted: 2024-04-15 | Resolved: 2024-06-03

## 2024-07-13 ENCOUNTER — HEALTH MAINTENANCE LETTER (OUTPATIENT)
Age: 39
End: 2024-07-13

## 2025-07-19 ENCOUNTER — HEALTH MAINTENANCE LETTER (OUTPATIENT)
Age: 40
End: 2025-07-19